# Patient Record
Sex: MALE | Race: ASIAN | Employment: OTHER | URBAN - METROPOLITAN AREA
[De-identification: names, ages, dates, MRNs, and addresses within clinical notes are randomized per-mention and may not be internally consistent; named-entity substitution may affect disease eponyms.]

---

## 2017-03-14 ENCOUNTER — OFFICE VISIT (OUTPATIENT)
Dept: OPHTHALMOLOGY | Facility: CLINIC | Age: 82
End: 2017-03-14
Attending: OPHTHALMOLOGY
Payer: MEDICARE

## 2017-03-14 DIAGNOSIS — H52.4 MYOPIA WITH ASTIGMATISM AND PRESBYOPIA, UNSPECIFIED LATERALITY: ICD-10-CM

## 2017-03-14 DIAGNOSIS — Z96.1 PSEUDOPHAKIA OF BOTH EYES: ICD-10-CM

## 2017-03-14 DIAGNOSIS — H54.7 BLINDNESS: ICD-10-CM

## 2017-03-14 DIAGNOSIS — H02.836 DERMATOCHALASIS OF EYELIDS OF BOTH EYES: ICD-10-CM

## 2017-03-14 DIAGNOSIS — H33.052 OLD RETINAL DETACHMENT, TOTAL OR SUBTOTAL, LEFT: ICD-10-CM

## 2017-03-14 DIAGNOSIS — H52.209 MYOPIA WITH ASTIGMATISM AND PRESBYOPIA, UNSPECIFIED LATERALITY: ICD-10-CM

## 2017-03-14 DIAGNOSIS — H04.123 DRY EYES, BILATERAL: ICD-10-CM

## 2017-03-14 DIAGNOSIS — E11.9 TYPE 2 DIABETES MELLITUS WITHOUT RETINOPATHY (H): Primary | ICD-10-CM

## 2017-03-14 DIAGNOSIS — H40.001 GLAUCOMA SUSPECT, RIGHT: ICD-10-CM

## 2017-03-14 DIAGNOSIS — H52.10 MYOPIA WITH ASTIGMATISM AND PRESBYOPIA, UNSPECIFIED LATERALITY: ICD-10-CM

## 2017-03-14 DIAGNOSIS — H02.833 DERMATOCHALASIS OF EYELIDS OF BOTH EYES: ICD-10-CM

## 2017-03-14 PROCEDURE — 92015 DETERMINE REFRACTIVE STATE: CPT | Mod: GY,ZF

## 2017-03-14 PROCEDURE — 92133 CPTRZD OPH DX IMG PST SGM ON: CPT | Mod: ZF | Performed by: OPHTHALMOLOGY

## 2017-03-14 PROCEDURE — 99215 OFFICE O/P EST HI 40 MIN: CPT | Mod: ZF

## 2017-03-14 ASSESSMENT — CUP TO DISC RATIO
OS_RATIO: 0.75
OD_RATIO: 0.75

## 2017-03-14 ASSESSMENT — REFRACTION_MANIFEST
OD_CYLINDER: +0.75
OS_SPHERE: BALANCE
OD_SPHERE: -0.75
OD_ADD: +2.75
OD_AXIS: 085

## 2017-03-14 ASSESSMENT — VISUAL ACUITY
OD_PH_SC: 20/40-2
OD_SC+: +2
OS_SC: NLP
METHOD: SNELLEN - LINEAR
OD_SC: 20/60

## 2017-03-14 ASSESSMENT — REFRACTION_WEARINGRX
OS_SPHERE: BALANCE
OD_SPHERE: -2.25
OD_AXIS: 095
OD_CYLINDER: +1.00

## 2017-03-14 ASSESSMENT — TONOMETRY
OD_IOP_MMHG: 20
IOP_METHOD: TONOPEN
OS_IOP_MMHG: 26

## 2017-03-14 ASSESSMENT — CONF VISUAL FIELD
OS_INFERIOR_NASAL_RESTRICTION: 1
OS_SUPERIOR_NASAL_RESTRICTION: 1
OS_SUPERIOR_TEMPORAL_RESTRICTION: 1
OS_INFERIOR_TEMPORAL_RESTRICTION: 1

## 2017-03-14 ASSESSMENT — PACHYMETRY: OD_CT(UM): 564

## 2017-03-14 ASSESSMENT — SLIT LAMP EXAM - LIDS
COMMENTS: UPPER LID DERMATOCHALASIS
COMMENTS: UPPER LID DERMATOCHALASIS

## 2017-03-14 NOTE — NURSING NOTE
Chief Complaints and History of Present Illnesses   Patient presents with     Eye Exam For Diabetes     Yearly follow up both eyes.     HPI    Affected eye(s):  Both   Symptoms:     No floaters   No flashes   No redness   No Dryness         Do you have eye pain now?:  No      Comments:  Pt here for his yearly diabetic eye exam. No changes in vision since last visit.  DMII BS: 140 this morning.  A1C: most recent unknown to pt.  Lab Results       Component                Value               Date                       A1C                      5.0                 12/20/2015                 A1C                                          06/19/2014             Unable to calculate HGB <6        A1C                      5.4                 07/23/2009                 A1C                      8.2                 03/28/2006                 A1C                      5.4                 09/09/2005              Oni Palomo [facility] March 14, 2017 10:34 AM

## 2017-03-14 NOTE — PROGRESS NOTES
CC: Diabetic Eye exam    HPI: Last A1C unknown, BGs 130-150 at home, per pts wife on insulin. No vision changes or other complaints. No pain, irritation or discomfort. No flashes/floaters. C/o occasional tearing. Uses AT PRN with improvement in condition.      Pt here with wife and . Pt is hard of hearing with limited cooperation on exam    History:  left eye blindness  Cataract extraction left eye 10+ years ago   Seizure disorder  ESRD on Dialysis    Total retinal detachment left eye, and he was referred for treatment to Dr. Ryland Rankin, who performed surgery on his left eye. We have no record of follow-up or subsequent exams. At the time of his eye exam in 2003, his visual acuity was hand movements only left eye    Assessment & Plan      Angle Munguia is a 81 year old male with the following diagnoses:   1. Type 2 diabetes mellitus without retinopathy (H)    2. Myopia with astigmatism and presbyopia, unspecified laterality    3. Blindness - Left Eye    4. 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 Dry eyes, bilateral    6. Pseudophakia of both eyes    7. Dermatochalasis of eyelids of both eyes       1. Type 2 diabetes without ophthalmic manifestation   Diabetes, No retinopathy   Stressed good glycemic and hypertensive control   Monitor yearly    2. Myopia with astigmatism and presbyopia, unspecified laterality   BCVA 20/40     Updated manifest refraction given   Tilted disc m right eye        3. Blindness - Left Eye   Due to Total Retinal detachment  In early 2000s   Monocular precautions    4. 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 Dry eyes    6. Pseudophakia both eyes    Monitor    7. Dermatochalasis   Monitor    8. Glc suspect, right eye      Cupping with superior and  inferior notch both eyes    Significant peripapillary atrophy   OCT Nerve fiber layer stable to 2013   Intraocular pressure within normal limits    Denies family history    Monitor closely for now      Patient disposition:   Return in about 6 months (around 9/14/2017) for OCT NFL, VT only. Dilated fundus exam in 1 year        Ant Nieto MD  PGY2, Dept of Ophthalmology  Pager [phone]      Attending Physician Attestation: Complete documentation of historical and exam elements from today's encounter can be found in the full encounter summary report (not reduplicated in this progress note). I personally obtained the chief complaint(s) and history of present illness.  I confirmed and edited as necessary the review of systems, past medical/surgical history, family history, social history, and examination findings as documented by others; and I examined the patient myself. I personally reviewed the relevant tests, images, and reports as documented above. I formulated and edited as necessary the assessment and plan and discussed the findings and management plan with the patient and family. - Tanner Bates MD 9:44 PM 3/14/2017

## 2017-03-14 NOTE — MR AVS SNAPSHOT
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      Your next 10 appointments already scheduled     Sep 12, 2017 10:15 AM CDT   RETURN GENERAL with Tanner Bates MD   Eye Clinic ([facility])    [address]  [address]  [address]  [place] [id number] [phone]              Who to contact     Please call your clinic at [phone] to:    Ask questions about your health    Make or cancel appointments    Discuss your medicines    Learn about your test results    Speak to your doctor   If you have compliments or concerns about an experience at your clinic, or if you wish to file a complaint, please contact [facility] Patient Relations at [phone] or email us at [email or web address]         Additional Information About Your Visit        MyChart Information     [employer] is an electronic gateway that provides easy, online access to your medical records. With [email or web address], you can request a clinic appointment, read your test results, renew a prescription or communicate with your care team.     To sign up for [employer] visit the website at [email or web address]   You will  be asked to enter the access code listed below, as well as some personal information. Please follow the directions to create your username and password.     Your access code is: [email or web address]  Expires: 2017  9:30 AM     Your access code will  in 90 days. If you need help or a new code, please contact your [facility] or call [phone] for assistance.        Care EveryWhere ID     This is your Care EveryWhere ID. This could be used by other organizations to access your [place] medical records  [id number]         Blood Pressure from Last 3 Encounters:   16 150/68   16 136/65   16 149/61    Weight from Last 3 Encounters:   12/22/15 62.1 kg (137 lb)   03/03/15 58.1 kg (128 lb)   14 60.3 kg (133 lb)              We Performed the Following     OCT Optic Nerve RNFL Spectralis OU (both eyes)        Primary Care Provider Office Phone # Fax #    La Nena Garcia [phone]       [facility]  [facility] [address]        Thank you!     Thank you for choosing EYE CLINIC  for your care. Our goal is always to provide you with excellent care. Hearing back from our patients is one way we can continue to improve our services. Please take a few minutes to complete the written survey that you may receive in the mail after your visit with us. Thank you!             Your Updated Medication List - Protect others around you: Learn how to safely use, store and throw away your medicines at www.disposemymeds.org.          This list is accurate as of: 3/14/17 12:49 PM.  Always use your most recent med list.                   Brand Name Dispense Instructions for use    calcium acetate 667 MG Caps capsule    PHOSLO     Take 667 mg by mouth 3 times daily (with meals)       calcium carbonate 500 MG tablet    OS-SHIRA 500 mg [place]. Ca     Take 500 mg by mouth 2 times daily       cefdinir 300 MG capsule    OMNICEF    5 capsule     Take 1 capsule (300 mg) by mouth every 48 hours       DIALYVITE Tabs      Take  by mouth.       ferrous sulfate 325 (65 FE) MG tablet    IRON     Take 325 mg by mouth daily (with breakfast)       lactulose 10 GM/15ML solution    CHRONULAC     Take 10 g by mouth as needed for constipation       metoprolol 25 MG 24 hr tablet    TOPROL-XL    30 tablet    Take 1 tablet (25 mg) by mouth daily       OMEPRAZOLE PO      Take 20 mg by mouth every morning       TYLENOL PO      Take 1,000 mg by mouth every 8 hours as needed       Urea 40 % Crea     180 g    Externally apply  topically 2 times daily. As directed.       VITAMIN D3 PO      Take 400 Units by mouth 2 times daily

## 2017-03-14 NOTE — LETTER
3/14/2017       RE: Angle Munguia  [address]  [place] [address]     Dear Colleague,    Thank you for referring your patient, Angle Munguia, to the EYE CLINIC at [facility]. Please see a copy of my visit note below.    CC: Diabetic Eye exam    HPI: Last A1C unknown, BGs 130-150 at home, per pts wife on insulin. No vision changes or other complaints. No pain, irritation or discomfort. No flashes/floaters. C/o occasional tearing. Uses AT PRN with improvement in condition.      Pt here with wife and . Pt is hard of hearing with limited cooperation on exam    History:  left eye blindness  Cataract extraction left eye 10+ years ago   Seizure disorder  ESRD on Dialysis    Total retinal detachment left eye, and he was referred for treatment to Dr. Ryland Rankin, who performed surgery on his left eye. We have no record of follow-up or subsequent exams. At the time of his eye exam in 2003, his visual acuity was hand movements only left eye    Assessment & Plan      Angle Munguia is a 81 year old male with the following diagnoses:   1. Type 2 diabetes mellitus without retinopathy (H)    2. Myopia with astigmatism and presbyopia, unspecified laterality    3. Blindness - Left Eye    4. 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 Dry eyes, bilateral    6. Pseudophakia of both eyes    7. Dermatochalasis of eyelids of both eyes       1. Type 2 diabetes without ophthalmic manifestation   Diabetes, No retinopathy   Stressed good glycemic and hypertensive control   Monitor yearly    2. Myopia with astigmatism and presbyopia, unspecified laterality   BCVA 20/40     Updated manifest refraction given   Tilted disc m right eye        3. Blindness - Left Eye   Due to Total Retinal detachment  In early 2000s   Monocular precautions    4. 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 Dry eyes    6. Pseudophakia both eyes    Monitor    7. Dermatochalasis   Monitor    8. Glc suspect, right eye      Cupping with superior and inferior notch both eyes    Significant peripapillary atrophy   OCT Nerve fiber layer stable to 2013   Intraocular pressure within normal limits    Denies family history    Monitor closely for now      Patient disposition:   Return in about 6 months (around 9/14/2017) for OCT NFL, VT only. Dilated fundus exam in 1 year        Ant Nieto MD  PGY2, Dept of Ophthalmology  Pager [phone]      Attending Physician Attestation: Complete documentation of historical and exam elements from today's encounter can be found in the full encounter summary report (not reduplicated in this progress note). I personally obtained the chief complaint(s) and history of present illness.  I confirmed and edited as necessary the review of systems, past medical/surgical history, family history, social history, and examination findings as documented by others; and I examined the patient myself. I personally reviewed the relevant tests, images, and reports as documented above. I formulated and edited as necessary the assessment and plan and discussed the findings and management plan with the patient and family. - Tanner Bates MD 9:44 PM 3/14/2017     Again, thank you for allowing me to participate in the care of your patient.      Sincerely,    Tanner Bates MD

## 2017-03-31 ENCOUNTER — TRANSFERRED RECORDS (OUTPATIENT)
Dept: HEALTH INFORMATION MANAGEMENT | Facility: CLINIC | Age: 82
End: 2017-03-31

## 2017-03-31 ENCOUNTER — HOSPITAL ENCOUNTER (EMERGENCY)
Facility: CLINIC | Age: 82
Discharge: HOME | End: 2017-03-31
Attending: EMERGENCY MEDICINE | Admitting: EMERGENCY MEDICINE
Payer: MEDICARE

## 2017-03-31 ENCOUNTER — APPOINTMENT (OUTPATIENT)
Dept: MRI IMAGING | Facility: CLINIC | Age: 82
End: 2017-03-31
Attending: EMERGENCY MEDICINE
Payer: MEDICARE

## 2017-03-31 ENCOUNTER — APPOINTMENT (OUTPATIENT)
Dept: CT IMAGING | Facility: CLINIC | Age: 82
End: 2017-03-31
Attending: EMERGENCY MEDICINE
Payer: MEDICARE

## 2017-03-31 ENCOUNTER — APPOINTMENT (OUTPATIENT)
Dept: GENERAL RADIOLOGY | Facility: CLINIC | Age: 82
End: 2017-03-31
Attending: EMERGENCY MEDICINE
Payer: MEDICARE

## 2017-03-31 ENCOUNTER — ALLIED HEALTH/NURSE VISIT (OUTPATIENT)
Dept: NEUROLOGY | Facility: CLINIC | Age: 82
End: 2017-03-31
Attending: PSYCHIATRY & NEUROLOGY
Payer: MEDICARE

## 2017-03-31 VITALS
RESPIRATION RATE: 18 BRPM | HEART RATE: 70 BPM | SYSTOLIC BLOOD PRESSURE: 123 MMHG | TEMPERATURE: 96.8 F | OXYGEN SATURATION: 99 % | WEIGHT: 135.6 LBS | HEIGHT: 60 IN | DIASTOLIC BLOOD PRESSURE: 67 MMHG | BODY MASS INDEX: 26.62 KG/M2

## 2017-03-31 DIAGNOSIS — N18.6 TYPE 2 DIABETES MELLITUS WITH ESRD (END-STAGE RENAL DISEASE) (H): ICD-10-CM

## 2017-03-31 DIAGNOSIS — N18.6 BENIGN HYPERTENSION WITH END-STAGE RENAL DISEASE (H): ICD-10-CM

## 2017-03-31 DIAGNOSIS — R56.9 SEIZURE (H): Primary | ICD-10-CM

## 2017-03-31 DIAGNOSIS — E11.22 TYPE 2 DIABETES MELLITUS WITH ESRD (END-STAGE RENAL DISEASE) (H): ICD-10-CM

## 2017-03-31 DIAGNOSIS — Z99.2 ENCOUNTER FOR EXTRACORPOREAL DIALYSIS (H): ICD-10-CM

## 2017-03-31 DIAGNOSIS — N18.6 ESRD (END STAGE RENAL DISEASE) ON DIALYSIS (H): ICD-10-CM

## 2017-03-31 DIAGNOSIS — R41.82 ALTERED MENTAL STATUS, UNSPECIFIED ALTERED MENTAL STATUS TYPE: ICD-10-CM

## 2017-03-31 DIAGNOSIS — N18.6 END STAGE RENAL DISEASE (H): ICD-10-CM

## 2017-03-31 DIAGNOSIS — I12.0 BENIGN HYPERTENSION WITH END-STAGE RENAL DISEASE (H): ICD-10-CM

## 2017-03-31 DIAGNOSIS — Z99.2 ESRD (END STAGE RENAL DISEASE) ON DIALYSIS (H): ICD-10-CM

## 2017-03-31 DIAGNOSIS — R40.4 UNRESPONSIVE EPISODE: ICD-10-CM

## 2017-03-31 LAB
ALBUMIN SERPL-MCNC: 3.4 G/DL (ref 3.4–5)
ALP SERPL-CCNC: 62 U/L (ref 40–150)
ALT SERPL W P-5'-P-CCNC: 45 U/L (ref 0–70)
AMMONIA PLAS-SCNC: 60 UMOL/L (ref 10–50)
ANION GAP SERPL CALCULATED.3IONS-SCNC: 16 MMOL/L (ref 3–14)
AST SERPL W P-5'-P-CCNC: 42 U/L (ref 0–45)
BASOPHILS # BLD AUTO: 0 10E9/L (ref 0–0.2)
BASOPHILS NFR BLD AUTO: 0.5 %
BILIRUB SERPL-MCNC: 0.6 MG/DL (ref 0.2–1.3)
BUN SERPL-MCNC: 11 MG/DL (ref 7–30)
CALCIUM SERPL-MCNC: 8.3 MG/DL (ref 8.5–10.1)
CHLORIDE SERPL-SCNC: 92 MMOL/L (ref 94–109)
CO2 SERPL-SCNC: 25 MMOL/L (ref 20–32)
CREAT SERPL-MCNC: 4.05 MG/DL (ref 0.66–1.25)
DIFFERENTIAL METHOD BLD: ABNORMAL
EOSINOPHIL # BLD AUTO: 0.1 10E9/L (ref 0–0.7)
EOSINOPHIL NFR BLD AUTO: 1.8 %
ERYTHROCYTE [DISTWIDTH] IN BLOOD BY AUTOMATED COUNT: 16.6 % (ref 10–15)
ETHANOL SERPL-MCNC: <0.01 G/DL
GFR SERPL CREATININE-BSD FRML MDRD: 14 ML/MIN/1.7M2
GLUCOSE BLDC GLUCOMTR-MCNC: 127 MG/DL (ref 70–99)
GLUCOSE SERPL-MCNC: 112 MG/DL (ref 70–99)
HCT VFR BLD AUTO: 29.8 % (ref 40–53)
HGB BLD-MCNC: 9.3 G/DL (ref 13.3–17.7)
IMM GRANULOCYTES # BLD: 0 10E9/L (ref 0–0.4)
IMM GRANULOCYTES NFR BLD: 0.2 %
INR PPP: 1.18 (ref 0.86–1.14)
INTERPRETATION ECG - MUSE: NORMAL
LYMPHOCYTES # BLD AUTO: 1.3 10E9/L (ref 0.8–5.3)
LYMPHOCYTES NFR BLD AUTO: 23.3 %
MCH RBC QN AUTO: 29.1 PG (ref 26.5–33)
MCHC RBC AUTO-ENTMCNC: 31.2 G/DL (ref 31.5–36.5)
MCV RBC AUTO: 93 FL (ref 78–100)
MONOCYTES # BLD AUTO: 0.4 10E9/L (ref 0–1.3)
MONOCYTES NFR BLD AUTO: 7.8 %
NEUTROPHILS # BLD AUTO: 3.7 10E9/L (ref 1.6–8.3)
NEUTROPHILS NFR BLD AUTO: 66.4 %
NRBC # BLD AUTO: 0 10*3/UL
NRBC BLD AUTO-RTO: 0 /100
PLATELET # BLD AUTO: 212 10E9/L (ref 150–450)
POTASSIUM SERPL-SCNC: 3.6 MMOL/L (ref 3.4–5.3)
PROLACTIN SERPL-MCNC: 75 UG/L (ref 2–18)
PROT SERPL-MCNC: 7.9 G/DL (ref 6.8–8.8)
RBC # BLD AUTO: 3.2 10E12/L (ref 4.4–5.9)
SODIUM SERPL-SCNC: 133 MMOL/L (ref 133–144)
TROPONIN I SERPL-MCNC: 0.01 UG/L (ref 0–0.04)
WBC # BLD AUTO: 5.5 10E9/L (ref 4–11)

## 2017-03-31 PROCEDURE — 00000146 ZZHCL STATISTIC GLUCOSE BY METER IP

## 2017-03-31 PROCEDURE — 96368 THER/DIAG CONCURRENT INF: CPT | Performed by: EMERGENCY MEDICINE

## 2017-03-31 PROCEDURE — 85025 COMPLETE CBC W/AUTO DIFF WBC: CPT | Performed by: EMERGENCY MEDICINE

## 2017-03-31 PROCEDURE — 95819 EEG AWAKE AND ASLEEP: CPT | Mod: ZF

## 2017-03-31 PROCEDURE — 84146 ASSAY OF PROLACTIN: CPT | Performed by: EMERGENCY MEDICINE

## 2017-03-31 PROCEDURE — 70551 MRI BRAIN STEM W/O DYE: CPT

## 2017-03-31 PROCEDURE — 80320 DRUG SCREEN QUANTALCOHOLS: CPT | Performed by: EMERGENCY MEDICINE

## 2017-03-31 PROCEDURE — 82140 ASSAY OF AMMONIA: CPT | Performed by: EMERGENCY MEDICINE

## 2017-03-31 PROCEDURE — 25800025 ZZH RX 258: Performed by: EMERGENCY MEDICINE

## 2017-03-31 PROCEDURE — 85610 PROTHROMBIN TIME: CPT | Performed by: EMERGENCY MEDICINE

## 2017-03-31 PROCEDURE — 25000128 H RX IP 250 OP 636: Performed by: EMERGENCY MEDICINE

## 2017-03-31 PROCEDURE — 99285 EMERGENCY DEPT VISIT HI MDM: CPT | Mod: Z6 | Performed by: EMERGENCY MEDICINE

## 2017-03-31 PROCEDURE — 93005 ELECTROCARDIOGRAM TRACING: CPT | Performed by: EMERGENCY MEDICINE

## 2017-03-31 PROCEDURE — 70450 CT HEAD/BRAIN W/O DYE: CPT

## 2017-03-31 PROCEDURE — 84484 ASSAY OF TROPONIN QUANT: CPT | Performed by: EMERGENCY MEDICINE

## 2017-03-31 PROCEDURE — 99285 EMERGENCY DEPT VISIT HI MDM: CPT | Mod: 25 | Performed by: EMERGENCY MEDICINE

## 2017-03-31 PROCEDURE — 80053 COMPREHEN METABOLIC PANEL: CPT | Performed by: EMERGENCY MEDICINE

## 2017-03-31 PROCEDURE — 96366 THER/PROPH/DIAG IV INF ADDON: CPT | Performed by: EMERGENCY MEDICINE

## 2017-03-31 PROCEDURE — 71010 XR CHEST PORT 1 VW: CPT

## 2017-03-31 PROCEDURE — 96365 THER/PROPH/DIAG IV INF INIT: CPT | Performed by: EMERGENCY MEDICINE

## 2017-03-31 PROCEDURE — S5010 5% DEXTROSE AND 0.45% SALINE: HCPCS | Performed by: EMERGENCY MEDICINE

## 2017-03-31 RX ORDER — ONDANSETRON 2 MG/ML
4 INJECTION INTRAMUSCULAR; INTRAVENOUS EVERY 30 MIN PRN
Status: DISCONTINUED | OUTPATIENT
Start: 2017-03-31 | End: 2017-04-01 | Stop reason: HOSPADM

## 2017-03-31 RX ORDER — LEVETIRACETAM 500 MG/1
500 TABLET ORAL DAILY
Qty: 30 TABLET | Refills: 0 | Status: SHIPPED | OUTPATIENT
Start: 2017-03-31

## 2017-03-31 RX ADMIN — LEVETIRACETAM 1000 MG: 100 INJECTION, SOLUTION INTRAVENOUS at 19:36

## 2017-03-31 RX ADMIN — DEXTROSE AND SODIUM CHLORIDE: 5; 450 INJECTION, SOLUTION INTRAVENOUS at 13:20

## 2017-03-31 NOTE — ED PROVIDER NOTES
History     Chief Complaint   Patient presents with     Altered Mental Status     The history is provided by the EMS personnel. The history is limited by the condition of the patient (altered mental status).     Angle Munguia is a 81 year old male, ong dialysis patient who was brought in by ambulance after 911 was called while the patient was at dialysis and became unresponsive. Paramedics arrived on the scene and found the patient with a blood sugar of 111 and an elevated blood pressure in the 190s. Patient was not following any commands and was brought to our facility and seen in cubicle 2 urgently. Paramedics noted that during the patient's dialysis run which lasted approximately 2 hours this morning the patient had a blood pressure spike up over 220 systolic at which point he became less responsive. The patient has had no vomiting and has been maintaining his own airway.    The patient's past medical history is significant for his end stage renal disease on dialysis as well as type 2 diabetes and left eye blindness. Patient's chronic medications include lactulose and metoprolol.     I have reviewed the Medications, Allergies, Past Medical and Surgical History, and Social History in the [employer] system.  Past Medical History:   Diagnosis Date     Chronic kidney disease      Dermatochalasis      Dialysis patient (H)      Dry eye syndrome      Gastric ulcer      Gastric ulcer      Glaucoma (increased eye pressure)      Glaucoma suspect      Gout      Hypertension      Nonsenile cataract      Retinal detachment     LE, now NLP      Seizures (H)      Type 2 diabetes mellitus without complications (H)        Past Surgical History:   Procedure Laterality Date     AV FISTULA OR GRAFT ARTERIAL      right arm     C PLACE CATH AV DIALYSIS SHUNT       CATARACT IOL, RT/LT Bilateral 2011     ESOPHAGOSCOPY, GASTROSCOPY, DUODENOSCOPY (EGD), COMBINED  1/30/2014    Procedure: COMBINED ESOPHAGOSCOPY, GASTROSCOPY, DUODENOSCOPY  (EGD), BIOPSY SINGLE OR MULTIPLE;;  Surgeon: Ashlyn Friedman MD;  Location:  GI     ESOPHAGOSCOPY, GASTROSCOPY, DUODENOSCOPY (EGD), COMBINED  3/27/2014    Procedure: COMBINED ESOPHAGOSCOPY, GASTROSCOPY, DUODENOSCOPY (EGD), BIOPSY SINGLE OR MULTIPLE;;  Surgeon: Ashlyn Friedman MD;  Location:  GI     UPPER GI ENDOSCOPY  3/27/14       Family History   Problem Relation Age of Onset     Glaucoma No family hx of      Macular Degeneration No family hx of        Social History   Substance Use Topics     Smoking status: Never Smoker     Smokeless tobacco: Not on file     Alcohol use No       Review of Systems   Unable to perform ROS: Mental status change       Physical Exam    /59  Pulse 94  Temp 96.8  F (36  C) (Oral)  Resp 16  Ht 1.524 m (5')  Wt 61.5 kg (135 lb 9.6 oz)  SpO2 100%  BMI 26.48 kg/m2  Physical Exam   Constitutional:   Patient gradually became more responsive as his ER course one on.  But initially was barely responsive except to painful stimulus.   HENT:   Head: Atraumatic.   Neck: Neck supple.   Airway patent   Cardiovascular: Normal heart sounds.    Pulmonary/Chest: Breath sounds normal. He exhibits no tenderness.   Abdominal: Soft. There is no tenderness.   Musculoskeletal: He exhibits no edema.   Neurological:   Able to move all extremities equally with painful stimulus   Skin: Skin is warm.   Psychiatric:   Unable to assess       ED Course     ED Course     Procedures        Patient was placed on cardiac monitor and oximetry.  The patient still had his dialysis access accessed but these lines were clamped off.    Patient's EKG revealed rate controlled atrial fibrillation an average rate of 96.  Patient's blood pressure gradually came down during his ER course.    Results for orders placed or performed during the hospital encounter of 03/31/17   XR Chest Port 1 View    Narrative    XR CHEST PORT 1 VW  3/31/2017 1:44 PM      HISTORY: AMS    COMPARISON:  12/18/2015    FINDINGS: AP view of the chest. Diffuse interstitial and airspace  opacities with moderate bilateral pleural effusions. Heart size is  stable. No pneumothorax. Left innominate vein stent stable in  position. Thoracic aortic calcifications again noted.      Impression    IMPRESSION:   1. Diffuse mixed interstitial airspace opacities have worsened,  consistent with pulmonary edema, severe infection, or ARDS.  2. Moderate bilateral layering pleural effusions.     I have personally reviewed the examination and initial interpretation  and I agree with the findings.    DHRUV HILL MD   CT Head w/o Contrast    Narrative    CT HEAD hemorrhage in the right CONTRAST 3/31/2017 2:02 PM    History: AMS    Comparison: 12/18/2015.    Technique: Using multidetector thin collimation helical acquisition  technique, axial, coronal and sagittal CT images from the skull base  to the vertex were obtained without intravenous contrast.     Findings:    There is no evidence of intracranial hemorrhage, mass effect, midline  shift, or abnormal extraaxial fluid collection. Redemonstration of  moderate generalized parenchymal volume loss. The ventricles and sulci  are symmetrical. Patchy regions of low-attenuation in the  periventricular and subcortical white matter, likely sequela of  chronic small ischemic disease. Gray-white differentiation is intact  throughout both cerebral hemispheres. Atherosclerotic vascular  calcifications also present.    The bony calvaria and the bones of the skull base appear normal.  The  visualized mastoid air cells are clear. Mucosal thickening of the left  sphenoid and ethmoid sinuses. No significant change chronic left  retinal hemorrhage with scleral banding procedure.       Impression    Impression:   1.  No acute intracranial hemorrhage or infarct.   2.  Chronic moderate generalized cerebral atrophy.  3.  Left sphenoid and ethmoid sinusitis. This is likely chronic given  the thickening  of the sphenoid sinus.  4.  Cerebral leukoariosis and atherosclerosis, unchanged.    I have personally reviewed the examination and initial interpretation  and I agree with the findings.    LUZ SAMPSON MD   CBC with platelets differential   Result Value Ref Range    WBC 5.5 4.0 - 11.0 10e9/L    RBC Count 3.20 (L) 4.4 - 5.9 10e12/L    Hemoglobin 9.3 (L) 13.3 - 17.7 g/dL    Hematocrit 29.8 (L) 40.0 - 53.0 %    MCV 93 78 - 100 fl    MCH 29.1 26.5 - 33.0 pg    MCHC 31.2 (L) 31.5 - 36.5 g/dL    RDW 16.6 (H) 10.0 - 15.0 %    Platelet Count 212 150 - 450 10e9/L    Diff Method Automated Method     % Neutrophils 66.4 %    % Lymphocytes 23.3 %    % Monocytes 7.8 %    % Eosinophils 1.8 %    % Basophils 0.5 %    % Immature Granulocytes 0.2 %    Nucleated RBCs 0 0 /100    Absolute Neutrophil 3.7 1.6 - 8.3 10e9/L    Absolute Lymphocytes 1.3 0.8 - 5.3 10e9/L    Absolute Monocytes 0.4 0.0 - 1.3 10e9/L    Absolute Eosinophils 0.1 0.0 - 0.7 10e9/L    Absolute Basophils 0.0 0.0 - 0.2 10e9/L    Abs Immature Granulocytes 0.0 0 - 0.4 10e9/L    Absolute Nucleated RBC 0.0    INR   Result Value Ref Range    INR 1.18 (H) 0.86 - 1.14   Comprehensive metabolic panel   Result Value Ref Range    Sodium 133 133 - 144 mmol/L    Potassium 3.6 3.4 - 5.3 mmol/L    Chloride 92 (L) 94 - 109 mmol/L    Carbon Dioxide 25 20 - 32 mmol/L    Anion Gap 16 (H) 3 - 14 mmol/L    Glucose 112 (H) 70 - 99 mg/dL    Urea Nitrogen 11 7 - 30 mg/dL    Creatinine 4.05 (H) 0.66 - 1.25 mg/dL    GFR Estimate 14 (L) >60 mL/min/1.7m2    GFR Estimate If Black 17 (L) >60 mL/min/1.7m2    Calcium 8.3 (L) 8.5 - 10.1 mg/dL    Bilirubin Total 0.6 0.2 - 1.3 mg/dL    Albumin 3.4 3.4 - 5.0 g/dL    Protein Total 7.9 6.8 - 8.8 g/dL    Alkaline Phosphatase 62 40 - 150 U/L    ALT 45 0 - 70 U/L    AST 42 0 - 45 U/L   Troponin I   Result Value Ref Range    Troponin I ES 0.015 0.000 - 0.045 ug/L   Ammonia   Result Value Ref Range    Ammonia 60 (H) 10 - 50 umol/L   Alcohol ethyl    Result Value Ref Range    Ethanol g/dL <0.01 <0.01 g/dL   Glucose by meter   Result Value Ref Range    Glucose 127 (H) 70 - 99 mg/dL   EKG 12-lead, tracing only   Result Value Ref Range    Interpretation ECG Click View Image link to view waveform and result      Critical Care time:  was greater than 30 minutes for this patient excluding procedures.      Labs Ordered and Resulted from Time of ED Arrival Up to the Time of Departure from the ED   CBC WITH PLATELETS DIFFERENTIAL - Abnormal; Notable for the following:        Result Value    RBC Count 3.20 (*)     Hemoglobin 9.3 (*)     Hematocrit 29.8 (*)     MCHC 31.2 (*)     RDW 16.6 (*)     All other components within normal limits   INR - Abnormal; Notable for the following:     INR 1.18 (*)     All other components within normal limits   COMPREHENSIVE METABOLIC PANEL - Abnormal; Notable for the following:     Chloride 92 (*)     Anion Gap 16 (*)     Glucose 112 (*)     Creatinine 4.05 (*)     GFR Estimate 14 (*)     GFR Estimate If Black 17 (*)     Calcium 8.3 (*)     All other components within normal limits   AMMONIA - Abnormal; Notable for the following:     Ammonia 60 (*)     All other components within normal limits   GLUCOSE BY METER - Abnormal; Notable for the following:     Glucose 127 (*)     All other components within normal limits   TROPONIN I   ALCOHOL ETHYL   PROLACTIN   PULSE OXIMETRY NURSING   CARDIAC CONTINUOUS MONITORING   PERIPHERAL IV CATHETER       Assessments & Plan (with Medical Decision Making)     I have reviewed the nursing notes.    Patient presented to the ER minimally responsive but managing his airway postdialysis.  As his ER course went on he became more responsive and alert and communicative with his family.  In the interim the patient underwent a large workup which included neurology consultation.  At this time etiology of his unresponsive episode was thought most likely to be seizure and the patient was loaded with Keppra and  underwent MRI.  At this time the patient will be sent home on Keppra and is to follow-up with neurology next week for review of his EEG and MRI results.    Medications   sodium chloride (PF) 0.9% PF flush 3 mL (not administered)   sodium chloride (PF) 0.9% PF flush 3 mL (3 mLs Intracatheter Not Given 3/31/17 1534)   dextrose 5% and 0.45% NaCl infusion ( Intravenous Stopped 3/31/17 2022)   ondansetron (ZOFRAN) injection 4 mg (not administered)   levETIRAcetam (KEPPRA) 1,000 mg in NaCl 0.9 % 100 mL intermittent infusion (0 mg Intravenous Stopped 3/31/17 2022)       I have reviewed the findings, diagnosis, plan and need for follow up with the patient.    New Prescriptions    LEVETIRACETAM (KEPPRA) 500 MG TABLET    Take 1 tablet (500 mg) by mouth daily       Final diagnoses:   Unresponsive episode - probable seizure   ESRD (end stage renal disease) on dialysis (H)     Please make an appointment to follow up with Neurology Clinic (phone: [phone]) in one week to review EEG and MRI results.    Return to the ER for worsening.    Please make an appointment to follow up with Your Primary Care Provider in 1-2 weeks for recheck.    Enrrique Lozano MD      3/31/2017   [facility], EMERGENCY DEPARTMENT     Enrrique Lozano MD  03/31/17 2029

## 2017-03-31 NOTE — CONSULTS
[facility]: [place]  Neurology Consultation    Patient Name:  Angle Munguia  MRN:  [id number]    :  1935  Date of Admission:  3/31/2017  Date of Service:  2017  Primary care provider:  [facility] Garcia      We were asked to see the patient by ED staff to evaluate:  Unresponsiveness    History of Present Illness: (history obtained from patient's daughter in law)  Angle Munguia is a 81 year old male with history of ESRD on HD who presents with unresponsiveness during dialysis.    Per patient's daughter-in-law, she was called while patient was in dialysis telling her that patient is being transported to the ED for seizure like activity and unresponsiveness.  She did not get further details.  Was hypertensive to the 220s. By the time she got to the ED, patient was arousable, but confused.    Per patient's daughter-in-law and chart review, patient first started having spells during sleep about 3 years ago.  His spells consist of jaw clenching, eye clenching, foaming at the mouth, and arm shaking during sleep.  Lasts for several minutes, then patient is confused for about an hour.  Used to occur once every couple months, but more recently has been occurring once per month since December, now three times per week.  Usually coincides with a dialysis day.  Patient was admitted to [facility] for similar episode 2014 where he had a negative EEG and CT head and was deemed syncope 2/2 hypovolemia.  Patient then followed up outpatient with Dr. Workman in neurology and had an outpatient sleep study that showed moderate obstructive sleep apnea, but did not catch one of these spells.  Treatment for NY was discussed with patient, who declined, and no further followup was completed.  It appeared that patient was supposed to undergo outpatient MRI and EEG for seizure workup, but was not completed.    Overall, patient's health has been declining over the past month.  He has gotten  weaker after dialysis and has required assistance with transfer.  His memory has also been declining, at times asking the same question after it has been answered.  Overall in the past year, his memory has worsened, but more rapidly over the past month.    ROS: A 10-point ROS unable to be obtained due to confusion.    Past Medical/Surgical History:  ESRD on HD  Gout  HTN  DMII    Medications:    Current Facility-Administered Medications   Medication     sodium chloride (PF) 0.9% PF flush 3 mL     sodium chloride (PF) 0.9% PF flush 3 mL     dextrose 5% and 0.45% NaCl infusion     ondansetron (ZOFRAN) injection 4 mg     Current Outpatient Prescriptions   Medication     cefdinir (OMNICEF) 300 MG capsule     lactulose (CHRONULAC) 10 GM/15ML solution     calcium carbonate (OS-SHIRA 500 MG [place]. [place]) 500 MG tablet     Acetaminophen (TYLENOL PO)     calcium acetate (PHOSLO) 667 MG CAPS     OMEPRAZOLE PO     metoprolol (TOPROL-XL) 25 MG 24 hr tablet     Cholecalciferol (VITAMIN D3 PO)     ferrous sulfate 325 (65 FE) MG tablet     Urea 40 % CREA     B Complex-C-Folic Acid (DIALYVITE) TABS       Allergies:    No Known Allergies    Social History: Unable to obtain due to mental status    Family History:  Unable to obtain due to mental status.    Neurologic Examination:    Vitals: /67  Pulse 94  Temp 96.8  F (36  C) (Oral)  Resp 17  Ht 1.524 m (5')  Wt 61.5 kg (135 lb 9.6 oz)  SpO2 100%  BMI 26.48 kg/m2  General: Cooperative, NAD  HEENT: Sclerae anicteric, neck supple   Neurologic:     Mental Status: Fully alert, attentive. Oriented to hospital and name, but not to birthday, month, or year.  Speech fluent per daughter-in-law.     Cranial Nerves:  Pupils equal and reactive.   EOMI with normal smooth pursuit.  Face symmetric at rest and with smile.  Tongue protrusion midline.  Shoulder shrug equal.     Motor: No involuntary movements observed. Normal tone.  Difficult to fully assess strength due to language barrier and  confusion, but able to raise both arms off bed and raise both legs off bed.     Deep Tendon Reflexes: 1+ bilateral biceps, difficult to elicit in brachioradialis and patellar.     Sensory: Difficult to assess given confusion.     Coordination: Could not follow commands to perform testing.     Station/Gait: Unsafe to test.    Pertinent Investigations:   CT head 3/31  1. No acute intracranial hemorrhage or infarct.   2. Chronic moderate generalized cerebral atrophy.  3. Left sphenoid and ethmoid sinusitis. This is likely chronic given  the thickening of the sphenoid sinus.  4. Cerebral leukoariosis and atherosclerosis, unchanged.    Sleep study 4/2015    Moderate NY however this may be an underestimation as the patient had minimal REM and minimal sleep supine.     NY can be treated with the following options:    Dental Appliance    Auto CPAP    Upper Airway Surgery    The patient had elevated periodic limb movements (PLMs). The majority of these were not associated with cortical arousal.     Treatment of PLMs (dopaminergic agents or delta-2 ligands) should be targeted at patients who either have wakeful motor restlessness or those in whom there is a high clinical suspicion of periodic limb movement disorder and not for elevated PLMs alone.    No target extensor spells were noted.    Recommend addressing comorbid sleep conditions and if events persist could repeat PSG. If there is a suspicion of possible epileptic etiology recommend referral to epilepsy program.    Impression:  Angle Munguia is a 81 year old male with history of ESRD on HD who presents with unresponsiveness during dialysis.  Patient also has a 3 year history of spells during sleep with seizure like activity and confusion afterwards, concerning for seizures.  Episode during dialysis was not well documented, but may represent similar episodes patient has had prior.  Has not returned back to baseline in terms of mental status, but improving over the  last several hours.    On review of her outpatient neurology notes, patient has prior sleep study that showed NY but did not capture one of his spells.  He did not complete further workup for seizures.  There did not appear to be a clear plan for followup after patient declined NY treatment.    With today's episode involving hypertension and possible seizure, there is also concern for PRES.      Recommendations:   --Recommend limited MRI to evaluate for PRES, can be missed on CT head.  --Recommend bedside EEG in ED as post-ictal state increases yield of capturing a seizure.   --Discussed with patient's family, who would prefer to take patient home if appropriate.  Please call neuro-on-call after EEG has been running for 30 minutes for interpretation.  Recommendations for discharge/followup vs admission and possible initiation of AED will be discussed upon review of EEG and MRI.  Would also like to see that patient continues to have improvement in his mental status over the next several hours.    Patient was seen and discussed with attending neurologist, Dr. Jessica Aguilar.    Josselin Naik   PGY3      EVENING UPDATE:  MRI reviewed - diffuse atrophy, as noted on head CT. Several subcortical white matter lesions on T2. No diffusion restriction. No evidence of PRES or stroke.  EEG showed diffuse slowing.    Recs:  1. Okay to discharge from neuro standpoint  2. Start keppra, 500mg daily, with an additional 500mg after each run of dialysis  3. Follow-up in neurology clinic in 6-8 weeks    Murray Guzman MD. Neuro PGY4.  3/31/2017 8:53 PM

## 2017-03-31 NOTE — ED AVS SNAPSHOT
[facility], Emergency Department    [address] [address]    Phone:  [phone]                                       Angle Munguia   MRN: [id number]    Department:  [facility], Emergency Department   Date of Visit:  3/31/2017           Patient Information     Date Of Birth          1935        Your diagnoses for this visit were:     Unresponsive episode probable seizure    ESRD (end stage renal disease) on dialysis (H)        You were seen by Enrrique Lozano MD.        Discharge Instructions       Please make an appointment to follow up with Neurology Clinic (phone: [phone]) in one week to review EEG and MRI results.    Return to the ER for worsening.    Please make an appointment to follow up with Your Primary Care Provider in 1-2 weeks for recheck.    Future Appointments        Provider Department Dept Phone Center    9/12/2017 10:15 AM Tanner Bates MD [facility] [phone] [facility]      24 Hour Appointment Hotline       To make an appointment at any [facility], call [phone] ([phone]). If you don't have a family doctor or clinic, we will help you find one. [facility] clinics are conveniently located to serve the needs of you and your family.             Review of your medicines      START taking        Dose / Directions Last dose taken    levETIRAcetam 500 MG tablet   Commonly known as:  KEPPRA   Dose:  500 mg   Quantity:  30 tablet        Take 1 tablet (500 mg) by mouth daily   Refills:  0          Our records show that you are taking the medicines listed below. If these are incorrect, please call your family doctor or clinic.        Dose / Directions Last dose taken    calcium acetate 667 MG Caps capsule   Commonly known as:  PHOSLO   Dose:  667 mg        Take 667 mg by mouth 3 times daily (with meals)   Refills:  0        calcium carbonate 500 MG tablet   Commonly known as:  OS-SHIRA 500 mg [place]   Dose:  500 mg        Take 500 mg  by mouth 2 times daily   Refills:  0        cefdinir 300 MG capsule   Commonly known as:  OMNICEF   Dose:  300 mg   Indication:  HCAP   Quantity:  5 capsule        Take 1 capsule (300 mg) by mouth every 48 hours   Refills:  0        DIALYVITE Tabs        Take  by mouth.   Refills:  0        ferrous sulfate 325 (65 FE) MG tablet   Commonly known as:  IRON   Dose:  325 mg        Take 325 mg by mouth daily (with breakfast)   Refills:  0        lactulose 10 GM/15ML solution   Commonly known as:  CHRONULAC   Dose:  10 g        Take 10 g by mouth as needed for constipation   Refills:  0        metoprolol 25 MG 24 hr tablet   Commonly known as:  TOPROL-XL   Dose:  25 mg   Quantity:  30 tablet        Take 1 tablet (25 mg) by mouth daily   Refills:  0        OMEPRAZOLE PO   Dose:  20 mg        Take 20 mg by mouth every morning   Refills:  0        TYLENOL PO   Dose:  1000 mg        Take 1,000 mg by mouth every 8 hours as needed   Refills:  0        Urea 40 % Crea   Quantity:  180 g        Externally apply  topically 2 times daily. As directed.   Refills:  3        VITAMIN D3 PO   Dose:  400 Units        Take 400 Units by mouth 2 times daily   Refills:  0                Prescriptions were sent or printed at these locations (1 Prescription)                   Other Prescriptions                Printed at Department/Unit printer (1 of 1)         levETIRAcetam (KEPPRA) 500 MG tablet                Procedures and tests performed during your visit     Alcohol ethyl    Ammonia    CBC with platelets differential    CT Head w/o Contrast    Cardiac Continuous Monitoring    Comprehensive metabolic panel    EEG    EKG 12-lead, tracing only    Glucose by meter    INR    MR Brain for Stroke Limited    Peripheral IV catheter    Prolactin    Pulse oximetry nursing    Troponin I    XR Chest Port 1 View      Orders Needing Specimen Collection     None      Pending Results     Date and Time Order Name Status Description    3/31/2017 2842   "Brain for Stroke Limited In process     3/31/2017 1307 Prolactin In process             Pending Culture Results     No orders found from 3/29/2017 to 2017.            Thank you for choosing [facility]       Thank you for choosing [facility] for your care. Our goal is always to provide you with excellent care. Hearing back from our patients is one way we can continue to improve our services. Please take a few minutes to complete the written survey that you may receive in the mail after you visit with us. Thank you!        [employer] Information     [employer] lets you send messages to your doctor, view your test results, renew your prescriptions, schedule appointments and more. To sign up, go to [email or web address] . Click on \"Log in\" on the left side of the screen, which will take you to the Welcome page. Then click on \"Sign up Now\" on the right side of the page.     You will be asked to enter the access code listed below, as well as some personal information. Please follow the directions to create your username and password.     Your access code is: [email or web address]  Expires: 2017  9:30 AM     Your access code will  in 90 days. If you need help or a new code, please call your [facility] clinic or [phone].        Care EveryWhere ID     This is your Care EveryWhere ID. This could be used by other organizations to access your [facility] medical records  [id number]        After Visit Summary       This is your record. Keep this with you and show to your community pharmacist(s) and doctor(s) at your next visit.                  "

## 2017-03-31 NOTE — PROGRESS NOTES
REPORT of E.D. Electroencephalography              DATE OF RECORDIN2017          I reviewed the urgent portable EEG recording of Angle Munguia in the E.D.        The 24-minute recording was abnormal due to generalized delta-theta slowing during waking.  No electrographic seizures and no interictal epileptiform abnormalities were observed.         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  This recording excludes non-convulsive status epilepticus as a possible cause of this encephalopathy.    Gee Sifuentes M.D., [facility] [phone]

## 2017-03-31 NOTE — ED NOTES
Bed: ED02  Expected date: 3/31/17  Expected time: 12:57 PM  Means of arrival: Ambulance  Comments:  [address]    80 y/o male    Altered LOC and HTN  Hx:  Dialysis

## 2017-03-31 NOTE — ED AVS SNAPSHOT
[facility] [place], Emergency Department    [address] [place] [address]    Phone:  [phone]                                       Angle Munguia   MRN: [id number]    Department:  [facility], Emergency Department   Date of Visit:  3/31/2017           After Visit Summary Signature Page     I have received my discharge instructions, and my questions have been answered. I have discussed any challenges I see with this plan with the nurse or doctor.    ..........................................................................................................................................  Patient/Patient Representative Signature      ..........................................................................................................................................  Patient Representative Print Name and Relationship to Patient    ..................................................               ................................................  Date                                            Time    ..........................................................................................................................................  Reviewed by Signature/Title    ...................................................              ..............................................  Date                                                            Time

## 2017-03-31 NOTE — MR AVS SNAPSHOT
After Visit Summary   3/31/2017    Angle Munguia    MRN: [id number]           Patient Information     Date Of Birth          1935        Visit Information        Provider Department      3/31/2017 5:00 PM [facility] EEG TECH 2 [facility]        Today's Diagnoses     Seizure (H)    -  1       Follow-ups after your visit        Your next 10 appointments already scheduled     Sep 12, 2017 10:15 AM CDT   RETURN GENERAL with Tanner Bates MD   Eye Clinic ([facility])    [address]  [address]  [address]  [facility] [address]   [phone]              Future tests that were ordered for you today     Open Standing Orders        Priority Remaining Interval Expires Ordered    Oxygen: Nasal cannula, Oxygen mask STAT 78466/95583 CONTINUOUS  3/31/2017            Who to contact     Please call your clinic at [phone] to:    Ask questions about your health    Make or cancel appointments    Discuss your medicines    Learn about your test results    Speak to your doctor   If you have compliments or concerns about an experience at your clinic, or if you wish to file a complaint, please contact [facility] Patient Relations at [phone] or email us at [email or web address]         Additional Information About Your Visit        MyChart Information     Snippets is an electronic gateway that provides easy, online access to your medical records. With Snippets, you can request a clinic appointment, read your test results, renew a prescription or communicate with your care team.     To sign up for [employer] visit the website at [email or web address]   You will be asked to enter the access code listed below, as well as some personal information. Please follow the directions to create your username and password.     Your access code is: [email or web address]  Expires: 2017  9:30 AM     Your access code will  in 90 days. If you need help or a  new code, please contact your [facility] or call [phone] for assistance.        Care EveryWhere ID     This is your Care EveryWhere ID. This could be used by other organizations to access your [facility] medical records  [id number]         Blood Pressure from Last 3 Encounters:   03/31/17 123/67   12/14/16 150/68   08/18/16 136/65    Weight from Last 3 Encounters:   03/31/17 61.5 kg (135 lb 9.6 oz)   12/22/15 62.1 kg (137 lb)   03/03/15 58.1 kg (128 lb)              We Performed the Following     EEG          Today's Medication Changes      Notice     This visit is during an admission. Changes to the med list made in this visit will be reflected in the After Visit Summary of the admission.             Primary Care Provider Office Phone # Fax #    [facility] Garcia [phone]       [facility] [address] [facility] [address]        Thank you!     Thank you for choosing [facility]  for your care. Our goal is always to provide you with excellent care. Hearing back from our patients is one way we can continue to improve our services. Please take a few minutes to complete the written survey that you may receive in the mail after your visit with us. Thank you!             Your Updated Medication List - Protect others around you: Learn how to safely use, store and throw away your medicines at www.disposemymeds.org.      Notice     This visit is during an admission. Changes to the med list made in this visit will be reflected in the After Visit Summary of the admission.

## 2017-03-31 NOTE — ED NOTES
Pt presents to ED via EMS from dialysis where Pt was noted to be unresponsive for unknown period of time. PT has been hypertensive.

## 2017-04-01 NOTE — PROCEDURES
[facility] EEG#:        DATE OF RECORDIN2017      DURATION OF RECORDIN minutes.      CLINICAL SUMMARY:  This urgent portable EEG recording was performed in evaluation of seizures in the Emergency Department for Angle Hung.  The recording was performed with 23 scalp electrodes placed in the 10-20 system.  He was not reported to be receiving medications at the time of this recording.      EEG ACTIVITIES DURING WAKING:  During the waking state there was a poorly sustained bilateral posterior dominant rhythm at 8 Hz, which was attenuated on eye opening.  Predominant electrocerebral activities during waking consisted of generalized irregular 2-8 Hz delta-theta slowing, with frequent intermixture of lower amplitude faster frequencies.  No interictal epileptiform abnormalities and no electrographic seizures were recorded.      IMPRESSION:  This was an abnormal awake EEG recording due to generalized delta-theta slowing.  No interictal epileptiform abnormalities and no electrographic seizures were recorded.    These findings exclude a possible diagnosis of nonconvulsive status epilepticus as a cause of encephalopathy.  These findings indicate moderate electrographic encephalopathy, which is etiologically nonspecific.  Clinical correlation is recommended.   Gee Sifuentes M.D., Professor of Neurology      D: 2017 18:07   T: 2017 19:25   MT:       Name:     ANGLE HUNG   MRN:      [id number]        Account:        [id number]   :      1935           Procedure Date: 2017      Document: [id number]

## 2017-04-01 NOTE — DISCHARGE INSTRUCTIONS
Please make an appointment to follow up with Neurology Clinic (phone: [phone]) in one week to review EEG and MRI results.    Return to the ER for worsening.    Please make an appointment to follow up with Your Primary Care Provider in 1-2 weeks for recheck.

## 2017-05-22 ENCOUNTER — TRANSFERRED RECORDS (OUTPATIENT)
Dept: HEALTH INFORMATION MANAGEMENT | Facility: CLINIC | Age: 82
End: 2017-05-22

## 2017-06-03 ENCOUNTER — TRANSFERRED RECORDS (OUTPATIENT)
Dept: HEALTH INFORMATION MANAGEMENT | Facility: CLINIC | Age: 82
End: 2017-06-03

## 2017-06-05 ENCOUNTER — HOSPITAL ENCOUNTER (OUTPATIENT)
Facility: CLINIC | Age: 82
Discharge: HOME | End: 2017-06-05
Attending: RADIOLOGY | Admitting: RADIOLOGY
Payer: MEDICARE

## 2017-06-05 ENCOUNTER — APPOINTMENT (OUTPATIENT)
Dept: INTERVENTIONAL RADIOLOGY/VASCULAR | Facility: CLINIC | Age: 82
End: 2017-06-05
Attending: INTERNAL MEDICINE
Payer: MEDICARE

## 2017-06-05 VITALS
DIASTOLIC BLOOD PRESSURE: 70 MMHG | SYSTOLIC BLOOD PRESSURE: 161 MMHG | TEMPERATURE: 97.4 F | RESPIRATION RATE: 20 BRPM | OXYGEN SATURATION: 94 % | HEART RATE: 84 BPM

## 2017-06-05 DIAGNOSIS — Z51.89 TREATMENT: ICD-10-CM

## 2017-06-05 LAB — GLUCOSE BLDC GLUCOMTR-MCNC: 100 MG/DL (ref 70–99)

## 2017-06-05 PROCEDURE — 99153 MOD SED SAME PHYS/QHP EA: CPT

## 2017-06-05 PROCEDURE — 27210906 ZZH KIT CR8

## 2017-06-05 PROCEDURE — 82962 GLUCOSE BLOOD TEST: CPT

## 2017-06-05 PROCEDURE — 27210845 ZZH DEVICE INFLATION CR5

## 2017-06-05 PROCEDURE — C1725 CATH, TRANSLUMIN NON-LASER: HCPCS

## 2017-06-05 PROCEDURE — 27210742 ZZH CATH CR1

## 2017-06-05 PROCEDURE — 25000128 H RX IP 250 OP 636: Performed by: RADIOLOGY

## 2017-06-05 PROCEDURE — C1769 GUIDE WIRE: HCPCS

## 2017-06-05 PROCEDURE — 27210804 ZZH SHEATH CR3

## 2017-06-05 PROCEDURE — 25000125 ZZHC RX 250: Performed by: RADIOLOGY

## 2017-06-05 PROCEDURE — 27210886 ZZH ACCESSORY CR5

## 2017-06-05 PROCEDURE — 40000854 ZZH STATISTIC SIMPLE TUBE INSERTION/CHARGE, PORT, CATH, FISTULOGRAM

## 2017-06-05 RX ORDER — LIDOCAINE 40 MG/G
CREAM TOPICAL
Status: DISCONTINUED | OUTPATIENT
Start: 2017-06-05 | End: 2017-06-05 | Stop reason: HOSPADM

## 2017-06-05 RX ORDER — NALOXONE HYDROCHLORIDE 0.4 MG/ML
.1-.4 INJECTION, SOLUTION INTRAMUSCULAR; INTRAVENOUS; SUBCUTANEOUS
Status: DISCONTINUED | OUTPATIENT
Start: 2017-06-05 | End: 2017-06-05 | Stop reason: HOSPADM

## 2017-06-05 RX ORDER — LIDOCAINE HYDROCHLORIDE 10 MG/ML
1-30 INJECTION, SOLUTION EPIDURAL; INFILTRATION; INTRACAUDAL; PERINEURAL
Status: DISCONTINUED | OUTPATIENT
Start: 2017-06-05 | End: 2017-06-05 | Stop reason: HOSPADM

## 2017-06-05 RX ORDER — HYDRALAZINE HYDROCHLORIDE 20 MG/ML
10 INJECTION INTRAMUSCULAR; INTRAVENOUS EVERY 6 HOURS PRN
Status: DISCONTINUED | OUTPATIENT
Start: 2017-06-05 | End: 2017-06-05 | Stop reason: HOSPADM

## 2017-06-05 RX ORDER — IOPAMIDOL 612 MG/ML
50 INJECTION, SOLUTION INTRAVASCULAR ONCE
Status: COMPLETED | OUTPATIENT
Start: 2017-06-05 | End: 2017-06-05

## 2017-06-05 RX ORDER — FENTANYL CITRATE 50 UG/ML
25-50 INJECTION, SOLUTION INTRAMUSCULAR; INTRAVENOUS EVERY 5 MIN PRN
Status: DISCONTINUED | OUTPATIENT
Start: 2017-06-05 | End: 2017-06-05 | Stop reason: HOSPADM

## 2017-06-05 RX ORDER — FENTANYL CITRATE 50 UG/ML
INJECTION, SOLUTION INTRAMUSCULAR; INTRAVENOUS
Status: DISCONTINUED
Start: 2017-06-05 | End: 2017-06-05 | Stop reason: HOSPADM

## 2017-06-05 RX ORDER — HYDRALAZINE HYDROCHLORIDE 20 MG/ML
INJECTION INTRAMUSCULAR; INTRAVENOUS
Status: DISCONTINUED
Start: 2017-06-05 | End: 2017-06-05 | Stop reason: HOSPADM

## 2017-06-05 RX ORDER — NITROGLYCERIN 0.4 MG/1
0.4 TABLET SUBLINGUAL EVERY 5 MIN PRN
COMMUNITY

## 2017-06-05 RX ORDER — HEPARIN SODIUM 1000 [USP'U]/ML
INJECTION, SOLUTION INTRAVENOUS; SUBCUTANEOUS
Status: DISCONTINUED
Start: 2017-06-05 | End: 2017-06-05 | Stop reason: HOSPADM

## 2017-06-05 RX ORDER — FLUMAZENIL 0.1 MG/ML
0.2 INJECTION, SOLUTION INTRAVENOUS
Status: DISCONTINUED | OUTPATIENT
Start: 2017-06-05 | End: 2017-06-05 | Stop reason: HOSPADM

## 2017-06-05 RX ADMIN — MIDAZOLAM HYDROCHLORIDE 0.5 MG: 1 INJECTION, SOLUTION INTRAMUSCULAR; INTRAVENOUS at 08:32

## 2017-06-05 RX ADMIN — FENTANYL CITRATE 25 MCG: 50 INJECTION, SOLUTION INTRAMUSCULAR; INTRAVENOUS at 08:32

## 2017-06-05 RX ADMIN — FENTANYL CITRATE 25 MCG: 50 INJECTION, SOLUTION INTRAMUSCULAR; INTRAVENOUS at 08:23

## 2017-06-05 RX ADMIN — HYDRALAZINE HYDROCHLORIDE 10 MG: 20 INJECTION INTRAMUSCULAR; INTRAVENOUS at 08:55

## 2017-06-05 RX ADMIN — MIDAZOLAM HYDROCHLORIDE 0.5 MG: 1 INJECTION, SOLUTION INTRAMUSCULAR; INTRAVENOUS at 08:23

## 2017-06-05 RX ADMIN — IOPAMIDOL 35 ML: 612 INJECTION, SOLUTION INTRAVENOUS at 09:01

## 2017-06-05 NOTE — IP AVS SNAPSHOT
MRN:[id number]                      After Visit Summary   6/5/2017    Angle Munguia    MRN: [id number]           Visit Information        Department      6/5/2017  6:06 AM [facility]          Review of your medicines      UNREVIEWED medicines. Ask your doctor about these medicines        Dose / Directions    ALLOPURINOL PO        Dose:  100 mg   Take 100 mg by mouth daily   Refills:  0       AMLODIPINE BESYLATE PO        Dose:  5 mg   Take 5 mg by mouth 2 times daily   Refills:  0       calcium acetate 667 MG Caps capsule   Commonly known as:  PHOSLO        Dose:  667 mg   Take 667 mg by mouth 3 times daily (with meals)   Refills:  0       calcium carbonate 1250 MG tablet   Commonly known as:  OS-SHIRA 500 mg [place]        Dose:  500 mg   Take 500 mg by mouth 2 times daily   Refills:  0       cefdinir 300 MG capsule   Commonly known as:  OMNICEF   Indication:  HCAP   Used for:  Community acquired pneumonia        Dose:  300 mg   Take 1 capsule (300 mg) by mouth every 48 hours   Quantity:  5 capsule   Refills:  0       DIALYVITE Tabs        Take  by mouth.   Refills:  0       lactulose 10 GM/15ML solution   Commonly known as:  CHRONULAC        Dose:  10 g   Take 10 g by mouth as needed for constipation   Refills:  0       levETIRAcetam 500 MG tablet   Commonly known as:  KEPPRA        Dose:  500 mg   Take 1 tablet (500 mg) by mouth daily   Quantity:  30 tablet   Refills:  0       LISINOPRIL PO        Dose:  20 mg   Take 20 mg by mouth daily   Refills:  0       metoprolol 25 MG 24 hr tablet   Commonly known as:  TOPROL-XL   Used for:  Atrial fibrillation (H)        Dose:  25 mg   Take 1 tablet (25 mg) by mouth daily   Quantity:  30 tablet   Refills:  0       nitroglycerin 0.4 MG sublingual tablet   Commonly known as:  NITROSTAT        Dose:  0.4 mg   Place 0.4 mg under the tongue every 5 minutes as needed for chest pain For chest pain place 1 tablet under the tongue every 5  minutes for 3 doses. If symptoms persist 5 minutes after 1st dose call 911.   Refills:  0       OMEPRAZOLE PO        Dose:  20 mg   Take 20 mg by mouth every morning   Refills:  0       PLAVIX PO        Take by mouth daily   Refills:  0       TYLENOL PO        Dose:  1000 mg   Take 1,000 mg by mouth every 8 hours as needed   Refills:  0       Urea 40 % Crea   Used for:  Xerosis cutis        Externally apply  topically 2 times daily. As directed.   Quantity:  180 g   Refills:  3       VITAMIN D3 PO        Dose:  400 Units   Take 400 Units by mouth daily   Refills:  0                Protect others around you: Learn how to safely use, store and throw away your medicines at www.disposemymeds.org.         Follow-ups after your visit        Your next 10 appointments already scheduled     Sep 12, 2017 10:15 AM CDT   RETURN GENERAL with Tanner Bates MD   [facility] ([facility])    [address]  [address]  [facility] [address]   [phone]               Care Instructions        Further instructions from your care team       Fistulagram Discharge Instructions     After you go home:      You may resume your normal diet    Have an adult stay with you for 6 hours if you received sedation       For 24 hours - due to the sedation you received:    Relax and take it easy    Do NOT make any important or legal decisions    Do NOT drive or operate machines at home or at work    Do NOT drink alcohol    Care of Puncture Site:      For the first 48 hrs, check your puncture site every couple hours while you are awake     You may remove/change the bandage tomorrow    You may shower tomorrow    No tub baths, whirlpools or swimming until your puncture site has fully healed    If puncture site starts to bleed - apply light pressure to site for 5 minutes or until bleeding stops     Activity       You should try to elevate your arm for the remainder of today to prevent increased  "swelling    You may go back to your normal activity in 24 hours     Wait 48 hours before lifting, straining, exercise or other strenuous activity    Medicines:      You may resume all your medications    For minor pain, you may take Acetaminophen (Tylenol) or Ibuprofen (Advil)                 Call the provider who ordered this procedure if:      Increased pain or a large or growing hard lump around the site    Blood or fluid is draining from the site    The site is red, swollen, hot or tender    Chills or a fever greater than 101 F (38 C)    Pain that is getting worse    Any questions or concerns      Call  911 or go to the Emergency Room if:    Severe pain or trouble breathing    Bleeding that you cannot control      If you have questions call:          [facility] Radiology Dept @ [phone]        The provider who performed your procedure was ___Dr Denny______________.           Additional Information About Your Visit        MyChart Information     [employer] lets you send messages to your doctor, view your test results, renew your prescriptions, schedule appointments and more. To sign up, go to [email or web address] . Click on \"Log in\" on the left side of the screen, which will take you to the Welcome page. Then click on \"Sign up Now\" on the right side of the page.     You will be asked to enter the access code listed below, as well as some personal information. Please follow the directions to create your username and password.     Your access code is: [email or web address]  Expires: 9/3/2017  9:48 AM     Your access code will  in 90 days. If you need help or a new code, please call your [place] clinic or [phone].        Care EveryWhere ID     This is your Care EveryWhere ID. This could be used by other organizations to access your [place] medical records  [id number]        Your Vitals Were     Blood Pressure Pulse Temperature Respirations Pulse Oximetry       171/76 84 97.4  F (36.3  C) (Oral) " 16 97%        Primary Care Provider Office Phone # Fax #    La Nena Garcia [phone]      Thank you!     Thank you for choosing [facility] for your care. Our goal is always to provide you with excellent care. Hearing back from our patients is one way we can continue to improve our services. Please take a few minutes to complete the written survey that you may receive in the mail after you visit with us. Thank you!             Medication List: This is a list of all your medications and when to take them. Check marks below indicate your daily home schedule. Keep this list as a reference.      Medications           Morning Afternoon Evening Bedtime As Needed    ALLOPURINOL PO   Take 100 mg by mouth daily                                AMLODIPINE BESYLATE PO   Take 5 mg by mouth 2 times daily                                calcium acetate 667 MG Caps capsule   Commonly known as:  PHOSLO   Take 667 mg by mouth 3 times daily (with meals)                                calcium carbonate 1250 MG tablet   Commonly known as:  OS-SHIRA 500 mg [place] [place]   Take 500 mg by mouth 2 times daily                                cefdinir 300 MG capsule   Commonly known as:  OMNICEF   Take 1 capsule (300 mg) by mouth every 48 hours                                DIALYVITE Tabs   Take  by mouth.                                lactulose 10 GM/15ML solution   Commonly known as:  CHRONULAC   Take 10 g by mouth as needed for constipation                                levETIRAcetam 500 MG tablet   Commonly known as:  KEPPRA   Take 1 tablet (500 mg) by mouth daily                                LISINOPRIL PO   Take 20 mg by mouth daily                                metoprolol 25 MG 24 hr tablet   Commonly known as:  TOPROL-XL   Take 1 tablet (25 mg) by mouth daily                                nitroglycerin 0.4 MG sublingual tablet   Commonly known as:  NITROSTAT   Place 0.4 mg under the tongue every 5 minutes as needed for  chest pain For chest pain place 1 tablet under the tongue every 5 minutes for 3 doses. If symptoms persist 5 minutes after 1st dose call 911.                                OMEPRAZOLE PO   Take 20 mg by mouth every morning                                PLAVIX PO   Take by mouth daily                                TYLENOL PO   Take 1,000 mg by mouth every 8 hours as needed                                Urea 40 % Crea   Externally apply  topically 2 times daily. As directed.                                VITAMIN D3 PO   Take 400 Units by mouth daily

## 2017-06-05 NOTE — PROGRESS NOTES
Pre procedure plan of care reviewed with pt and family prior to sedation with .  All questions answered.

## 2017-06-05 NOTE — PROGRESS NOTES
Care Suites Arrival    Reason for Visit: fistulagram  Arrival interventions:none    Pt resting in bed/recyliner, denies additional needs at this time, call light in reach.  Waiting for procedure.

## 2017-06-05 NOTE — IP AVS SNAPSHOT
[place] [address]    [place] [address]    Phone:  [phone]                                       After Visit Summary   6/5/2017    Angle Munguia    MRN: [id number]           After Visit Summary Signature Page     I have received my discharge instructions, and my questions have been answered. I have discussed any challenges I see with this plan with the nurse or doctor.    ..........................................................................................................................................  Patient/Patient Representative Signature      ..........................................................................................................................................  Patient Representative Print Name and Relationship to Patient    ..................................................               ................................................  Date                                            Time    ..........................................................................................................................................  Reviewed by Signature/Title    ...................................................              ..............................................  Date                                                            Time

## 2017-06-05 NOTE — PROGRESS NOTES
Pt returned from IR to [facility] via cart. Denies pain, nausea or vomiting.  Declines po food or fluids at this time.  Lunch tray sent with pt's family per request. Family aware that pt to dialysis at time of discharge.  D/C instructions reviewed prior to D/C to home with family driving.  All information reviewed with  present. Pt able to dangle and transfer to chair with stand by assist and tolerated well.  Fistula access site without bleeding or hematoma at time of D/C.

## 2017-06-05 NOTE — PROGRESS NOTES
Care Suites Discharge Summary    Discharge Criteria:   Discharge Criteria met per MD orders: Yes.   Vital signs stable.     Pt demonstrates ability to ambulate safely: Yes.  (See discharge questionnaire for additional information)    Discharge instructions & education:   Discharge instructions reviewed with patient and wife. Patient verbalizes  understanding.   Additional patient education provided:  none.     Medications:   Patient will be discharging on new medications- No. Patient verbalizes reason for use, start date, and side effects NA.    Items returned to patient:   Home and hospital acquired medications returned to patient N/A   Listed belongings gathered and returned to patient: Yes    Patient discharged to home via wheelchair with wife driving.    Rebeca Reed

## 2017-06-05 NOTE — DISCHARGE INSTRUCTIONS
Fistulagram Discharge Instructions     After you go home:      You may resume your normal diet    Have an adult stay with you for 6 hours if you received sedation       For 24 hours - due to the sedation you received:    Relax and take it easy    Do NOT make any important or legal decisions    Do NOT drive or operate machines at home or at work    Do NOT drink alcohol    Care of Puncture Site:      For the first 48 hrs, check your puncture site every couple hours while you are awake     You may remove/change the bandage tomorrow    You may shower tomorrow    No tub baths, whirlpools or swimming until your puncture site has fully healed    If puncture site starts to bleed - apply light pressure to site for 5 minutes or until bleeding stops     Activity       You should try to elevate your arm for the remainder of today to prevent increased swelling    You may go back to your normal activity in 24 hours     Wait 48 hours before lifting, straining, exercise or other strenuous activity    Medicines:      You may resume all your medications    For minor pain, you may take Acetaminophen (Tylenol) or Ibuprofen (Advil)                 Call the provider who ordered this procedure if:      Increased pain or a large or growing hard lump around the site    Blood or fluid is draining from the site    The site is red, swollen, hot or tender    Chills or a fever greater than 101 F (38 C)    Pain that is getting worse    Any questions or concerns      Call  911 or go to the Emergency Room if:    Severe pain or trouble breathing    Bleeding that you cannot control      If you have questions call:          [facility] Radiology Dept @ [phone]        The provider who performed your procedure was ___Dr Denny______________.

## 2017-06-05 NOTE — PROCEDURES
RADIOLOGY PROCEDURE NOTE  Patient name: Angle Munguia  MRN: [id number]  : 1935    Pre-procedure diagnosis: LUE swelling  Post-procedure diagnosis: Same    Procedure Date/Time: 2017  9:02 AM  Procedure: 8 mm PTA of upper arm fistula.  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  No complications.  Hypertensive, though tolerated well.  Estimated blood loss: < 5 ml  Specimen(s) collected with description:  Sheath removed  The patient tolerated the procedure well with no immediate complications.  Significant findings:  Please see above.    See imaging dictation for procedural details.    Provider name: Adonay Villar  Assistant(s):None

## 2017-08-14 ENCOUNTER — TRANSFERRED RECORDS (OUTPATIENT)
Dept: HEALTH INFORMATION MANAGEMENT | Facility: CLINIC | Age: 82
End: 2017-08-14

## 2017-08-15 RX ORDER — LIDOCAINE 40 MG/G
CREAM TOPICAL
Status: CANCELLED | OUTPATIENT
Start: 2017-08-15

## 2017-08-17 ENCOUNTER — HOSPITAL ENCOUNTER (OUTPATIENT)
Facility: CLINIC | Age: 82
Discharge: HOME | End: 2017-08-17
Attending: RADIOLOGY | Admitting: RADIOLOGY
Payer: MEDICARE

## 2017-08-17 ENCOUNTER — HOSPITAL ENCOUNTER (INPATIENT)
Dept: INTERVENTIONAL RADIOLOGY/VASCULAR | Facility: CLINIC | Age: 82
End: 2017-08-17
Attending: INTERNAL MEDICINE | Admitting: RADIOLOGY
Payer: MEDICARE

## 2017-08-17 VITALS
OXYGEN SATURATION: 96 % | DIASTOLIC BLOOD PRESSURE: 64 MMHG | TEMPERATURE: 98.5 F | WEIGHT: 120 LBS | HEART RATE: 91 BPM | BODY MASS INDEX: 23.44 KG/M2 | SYSTOLIC BLOOD PRESSURE: 163 MMHG | RESPIRATION RATE: 16 BRPM

## 2017-08-17 VITALS
DIASTOLIC BLOOD PRESSURE: 77 MMHG | SYSTOLIC BLOOD PRESSURE: 158 MMHG | OXYGEN SATURATION: 96 % | RESPIRATION RATE: 14 BRPM

## 2017-08-17 DIAGNOSIS — Z99.2 END STAGE RENAL DISEASE ON DIALYSIS (H): ICD-10-CM

## 2017-08-17 DIAGNOSIS — N18.6 END STAGE RENAL DISEASE ON DIALYSIS (H): ICD-10-CM

## 2017-08-17 LAB — INR PPP: 1.1 (ref 0.86–1.14)

## 2017-08-17 PROCEDURE — 27210845 ZZH DEVICE INFLATION CR5

## 2017-08-17 PROCEDURE — 85610 PROTHROMBIN TIME: CPT | Performed by: RADIOLOGY

## 2017-08-17 PROCEDURE — 25000128 H RX IP 250 OP 636: Performed by: NURSE PRACTITIONER

## 2017-08-17 PROCEDURE — 99153 MOD SED SAME PHYS/QHP EA: CPT

## 2017-08-17 PROCEDURE — 25000128 H RX IP 250 OP 636: Performed by: RADIOLOGY

## 2017-08-17 PROCEDURE — 27210742 ZZH CATH CR1

## 2017-08-17 PROCEDURE — 27210906 ZZH KIT CR8

## 2017-08-17 PROCEDURE — C1769 GUIDE WIRE: HCPCS

## 2017-08-17 PROCEDURE — C1725 CATH, TRANSLUMIN NON-LASER: HCPCS

## 2017-08-17 PROCEDURE — 40000854 ZZH STATISTIC SIMPLE TUBE INSERTION/CHARGE, PORT, CATH, FISTULOGRAM

## 2017-08-17 PROCEDURE — 27210886 ZZH ACCESSORY CR5

## 2017-08-17 PROCEDURE — 36415 COLL VENOUS BLD VENIPUNCTURE: CPT | Performed by: RADIOLOGY

## 2017-08-17 PROCEDURE — 27210804 ZZH SHEATH CR3

## 2017-08-17 RX ORDER — NALOXONE HYDROCHLORIDE 0.4 MG/ML
.1-.4 INJECTION, SOLUTION INTRAMUSCULAR; INTRAVENOUS; SUBCUTANEOUS
Status: DISCONTINUED | OUTPATIENT
Start: 2017-08-17 | End: 2017-08-17 | Stop reason: HOSPADM

## 2017-08-17 RX ORDER — LIDOCAINE 40 MG/G
CREAM TOPICAL
Status: DISCONTINUED | OUTPATIENT
Start: 2017-08-17 | End: 2017-08-17 | Stop reason: HOSPADM

## 2017-08-17 RX ORDER — IOPAMIDOL 612 MG/ML
50 INJECTION, SOLUTION INTRAVASCULAR ONCE
Status: COMPLETED | OUTPATIENT
Start: 2017-08-17 | End: 2017-08-17

## 2017-08-17 RX ORDER — HEPARIN SODIUM 1000 [USP'U]/ML
INJECTION, SOLUTION INTRAVENOUS; SUBCUTANEOUS
Status: DISCONTINUED
Start: 2017-08-17 | End: 2017-08-17 | Stop reason: HOSPADM

## 2017-08-17 RX ORDER — FENTANYL CITRATE 50 UG/ML
INJECTION, SOLUTION INTRAMUSCULAR; INTRAVENOUS
Status: DISCONTINUED
Start: 2017-08-17 | End: 2017-08-17 | Stop reason: HOSPADM

## 2017-08-17 RX ORDER — FLUMAZENIL 0.1 MG/ML
0.2 INJECTION, SOLUTION INTRAVENOUS
Status: DISCONTINUED | OUTPATIENT
Start: 2017-08-17 | End: 2017-08-17 | Stop reason: HOSPADM

## 2017-08-17 RX ORDER — FENTANYL CITRATE 50 UG/ML
25-50 INJECTION, SOLUTION INTRAMUSCULAR; INTRAVENOUS EVERY 5 MIN PRN
Status: DISCONTINUED | OUTPATIENT
Start: 2017-08-17 | End: 2017-08-17 | Stop reason: HOSPADM

## 2017-08-17 RX ORDER — LIDOCAINE HYDROCHLORIDE 10 MG/ML
1-30 INJECTION, SOLUTION EPIDURAL; INFILTRATION; INTRACAUDAL; PERINEURAL
Status: DISCONTINUED | OUTPATIENT
Start: 2017-08-17 | End: 2017-08-17 | Stop reason: HOSPADM

## 2017-08-17 RX ADMIN — IOPAMIDOL 50 ML: 612 INJECTION, SOLUTION INTRAVENOUS at 10:58

## 2017-08-17 RX ADMIN — MIDAZOLAM HYDROCHLORIDE 1 MG: 1 INJECTION, SOLUTION INTRAMUSCULAR; INTRAVENOUS at 10:06

## 2017-08-17 RX ADMIN — MIDAZOLAM HYDROCHLORIDE 1 MG: 1 INJECTION, SOLUTION INTRAMUSCULAR; INTRAVENOUS at 10:19

## 2017-08-17 RX ADMIN — FENTANYL CITRATE 50 MCG: 50 INJECTION, SOLUTION INTRAMUSCULAR; INTRAVENOUS at 10:06

## 2017-08-17 RX ADMIN — FENTANYL CITRATE 50 MCG: 50 INJECTION, SOLUTION INTRAMUSCULAR; INTRAVENOUS at 10:19

## 2017-08-17 NOTE — PROGRESS NOTES
Interventional Radiology Pre-Procedure Sedation Assessment   Time of Assessment: 9:48 AM    Expected Level: Moderate Sedation    Indication: Sedation is required for the following type of Procedure: Left upper arm fistula fistulagram with possible intervention which could include angioplasty and/or stent placement    Sedation and procedural consent: Risks, benefits and alternatives were discussed with Patient and Spouse, , Dr Luis Eduardo RAMOS Intake: Appropriately NPO for procedure    ASA Class: Class 2 - MILD SYSTEMIC DISEASE, NO ACUTE PROBLEMS, NO FUNCTIONAL LIMITATIONS.    Mallampati: Grade 2:  Soft palate, base of uvula, tonsillar pillars, and portion of posterior pharyngeal wall visible    Lungs: Clear to auscultation upper lobes, coarse/some crackles lower lobes    Heart: Normal heart sounds and rate    History and physical reviewed and no updates needed. I have reviewed the lab findings, diagnostic data, medications, and the plan for sedation. I have determined this patient to be an appropriate candidate for the planned sedation and procedure and have reassessed the patient IMMEDIATELY PRIOR to sedation and procedure.    Jasmina Morales, OTILIO CNP

## 2017-08-17 NOTE — DISCHARGE INSTRUCTIONS
Fistulagram Discharge Instructions     After you go home:      You may resume your normal diet    Have an adult stay with you for 6 hours if you received sedation       For 24 hours - due to the sedation you received:    Relax and take it easy    Do NOT make any important or legal decisions    Do NOT drive or operate machines at home or at work    Do NOT drink alcohol    Care of Puncture Site:      For the first 48 hrs, check your puncture site every couple hours while you are awake     You may remove/change the bandage tomorrow    You may shower tomorrow    No tub baths, whirlpools or swimming until your puncture site has fully healed    If puncture site starts to bleed - apply light pressure to site for 5 minutes or until bleeding stops     Activity       You should try to elevate your arm for the remainder of today to prevent increased swelling    You may go back to your normal activity in 24 hours     Wait 48 hours before lifting, straining, exercise or other strenuous activity    Medicines:      You may resume all your medications    For minor pain, you may take Acetaminophen (Tylenol) or Ibuprofen (Advil)                 Call the provider who ordered this procedure if:      Increased pain or a large or growing hard lump around the site    Blood or fluid is draining from the site    The site is red, swollen, hot or tender    Chills or a fever greater than 101 F (38 C)    Pain that is getting worse    Any questions or concerns      Call  911 or go to the Emergency Room if:    Severe pain or trouble breathing    Bleeding that you cannot control      If you have questions call:          [facility] Dept @ [phone]

## 2017-08-17 NOTE — IP AVS SNAPSHOT
[place] [address]    [place] [address]    Phone:  [phone]                                       After Visit Summary   8/17/2017    Angle Munguia    MRN: [id number]           After Visit Summary Signature Page     I have received my discharge instructions, and my questions have been answered. I have discussed any challenges I see with this plan with the nurse or doctor.    ..........................................................................................................................................  Patient/Patient Representative Signature      ..........................................................................................................................................  Patient Representative Print Name and Relationship to Patient    ..................................................               ................................................  Date                                            Time    ..........................................................................................................................................  Reviewed by Signature/Title    ...................................................              ..............................................  Date                                                            Time

## 2017-08-17 NOTE — PROGRESS NOTES
Interventional Radiology Intra-procedural Nursing Note    Patient Name: Angle Munguia  Medical Record Number: [id number]  Today's Date: August 17, 2017    Start Time: 1014  End of procedure time: 1055  Procedure: Left Upper Extremity Fistulogram with possible intervention  Report given to: Tatyana MALDONADO, Care Suites  Time pt departs:  1115  :  with patient the entire case    Other Notes: Patient arrived into IR suite 2 via cart at 0957 with . Patient awake and alert to all spheres, non-english speaking. VSS, normal sinus rhythm. Patient moved to table. Prepped and draped with 2% chlorhexidine to left arm. Dr. Hoff in room at 1013, timeout and procedure started. Angioplasty to fistula. Patient tolerated procedure well. Versed 2 mg and 100 fentanyl mcg given. Debrief with Dr. Hoff, angioplasty, no complications. Dressing to fistula clean, dry and intact. Good thrill. Patient back to care suites via cart with . Bedside report given.    Norma Lizarraga RN

## 2017-08-17 NOTE — IP AVS SNAPSHOT
MRN:[id number]                      After Visit Summary   8/17/2017    Angle Munguia    MRN: [id number]           Visit Information        Department      8/17/2017  8:30 AM [facility]          Review of your medicines      UNREVIEWED medicines. Ask your doctor about these medicines        Dose / Directions    ALLOPURINOL PO        Dose:  100 mg   Take 100 mg by mouth daily   Refills:  0       AMLODIPINE BESYLATE PO        Dose:  5 mg   Take 5 mg by mouth 2 times daily   Refills:  0       calcium acetate 667 MG Caps capsule   Commonly known as:  PHOSLO        Dose:  667 mg   Take 667 mg by mouth 3 times daily (with meals)   Refills:  0       calcium carbonate 1250 MG tablet   Commonly known as:  OS-SHIRA 500 mg [place]. Ca        Dose:  500 mg   Take 500 mg by mouth 2 times daily   Refills:  0       cefdinir 300 MG capsule   Commonly known as:  OMNICEF   Indication:  HCAP   Used for:  Community acquired pneumonia        Dose:  300 mg   Take 1 capsule (300 mg) by mouth every 48 hours   Quantity:  5 capsule   Refills:  0       DIALYVITE Tabs        Take  by mouth.   Refills:  0       lactulose 10 GM/15ML solution   Commonly known as:  CHRONULAC        Dose:  10 g   Take 10 g by mouth as needed for constipation   Refills:  0       levETIRAcetam 500 MG tablet   Commonly known as:  KEPPRA        Dose:  500 mg   Take 1 tablet (500 mg) by mouth daily   Quantity:  30 tablet   Refills:  0       LISINOPRIL PO        Dose:  20 mg   Take 20 mg by mouth daily   Refills:  0       metoprolol 25 MG 24 hr tablet   Commonly known as:  TOPROL-XL   Used for:  Atrial fibrillation (H)        Dose:  25 mg   Take 1 tablet (25 mg) by mouth daily   Quantity:  30 tablet   Refills:  0       nitroGLYcerin 0.4 MG sublingual tablet   Commonly known as:  NITROSTAT        Dose:  0.4 mg   Place 0.4 mg under the tongue every 5 minutes as needed for chest pain For chest pain place 1 tablet under the tongue every 5  minutes for 3 doses. If symptoms persist 5 minutes after 1st dose call 911.   Refills:  0       OMEPRAZOLE PO        Dose:  20 mg   Take 20 mg by mouth every morning   Refills:  0       PLAVIX PO        Take by mouth daily   Refills:  0       TYLENOL PO        Dose:  1000 mg   Take 1,000 mg by mouth every 8 hours as needed   Refills:  0       Urea 40 % Crea   Used for:  Xerosis cutis        Externally apply  topically 2 times daily. As directed.   Quantity:  180 g   Refills:  3       VITAMIN D3 PO        Dose:  400 Units   Take 400 Units by mouth daily   Refills:  0                Protect others around you: Learn how to safely use, store and throw away your medicines at www.disposemymeds.org.         Follow-ups after your visit        Your next 10 appointments already scheduled     Sep 12, 2017 10:15 AM CDT   RETURN GENERAL with Tanner Bates MD   Eye Clinic ([facility])    [address]  [address]  [address]  [facility] [address]   [phone]               Care Instructions        Further instructions from your care team       Fistulagram Discharge Instructions     After you go home:      You may resume your normal diet    Have an adult stay with you for 6 hours if you received sedation       For 24 hours - due to the sedation you received:    Relax and take it easy    Do NOT make any important or legal decisions    Do NOT drive or operate machines at home or at work    Do NOT drink alcohol    Care of Puncture Site:      For the first 48 hrs, check your puncture site every couple hours while you are awake     You may remove/change the bandage tomorrow    You may shower tomorrow    No tub baths, whirlpools or swimming until your puncture site has fully healed    If puncture site starts to bleed - apply light pressure to site for 5 minutes or until bleeding stops     Activity       You should try to elevate your arm for the remainder of today to prevent increased  "swelling    You may go back to your normal activity in 24 hours     Wait 48 hours before lifting, straining, exercise or other strenuous activity    Medicines:      You may resume all your medications    For minor pain, you may take Acetaminophen (Tylenol) or Ibuprofen (Advil)                 Call the provider who ordered this procedure if:      Increased pain or a large or growing hard lump around the site    Blood or fluid is draining from the site    The site is red, swollen, hot or tender    Chills or a fever greater than 101 F (38 C)    Pain that is getting worse    Any questions or concerns      Call  911 or go to the Emergency Room if:    Severe pain or trouble breathing    Bleeding that you cannot control      If you have questions call:          [facility] Radiology Dept @ [phone]                   Additional Information About Your Visit        [email or web address] Information     [employer] lets you send messages to your doctor, view your test results, renew your prescriptions, schedule appointments and more. To sign up, go to [email or web address] . Click on \"Log in\" on the left side of the screen, which will take you to the Welcome page. Then click on \"Sign up Now\" on the right side of the page.     You will be asked to enter the access code listed below, as well as some personal information. Please follow the directions to create your username and password.     Your access code is: [email or web address]  Expires: 9/3/2017  9:48 AM     Your access code will  in 90 days. If you need help or a new code, please call your [facility] clinic or [phone].        Care EveryWhere ID     This is your Care EveryWhere ID. This could be used by other organizations to access your [facility] medical records  [id number]        Your Vitals Were     Blood Pressure Pulse Temperature Respirations Weight Pulse Oximetry    174/87 (BP Location: Right arm) 91 98.5  F (36.9  C) (Oral) 16 54.4 kg (120 lb) 95%    BMI (Body Mass " Index)                   23.44 kg/m2            Primary Care Provider Office Phone # Fax #    La Nena Garcia [phone]      Equal Access to Services     CHAOYMARYANN MARIA : Kale kady car philippe Dozier, waalma rosada luqadaha, qajuanpablota kaalmada avel, dayo gil daronabimael hernandez lamitzikarla juaquin. So [facility] [phone].    ATENCIÓN: Si habla español, tiene a nolan disposición servicios gratuitos de asistencia lingüística. Llame al [phone].    We comply with applicable federal civil rights laws and [place] laws. We do not discriminate on the basis of race, color, national origin, age, disability sex, sexual orientation or gender identity.            Thank you!     Thank you for choosing [facility] for your care. Our goal is always to provide you with excellent care. Hearing back from our patients is one way we can continue to improve our services. Please take a few minutes to complete the written survey that you may receive in the mail after you visit with us. Thank you!             Medication List: This is a list of all your medications and when to take them. Check marks below indicate your daily home schedule. Keep this list as a reference.      Medications           Morning Afternoon Evening Bedtime As Needed    ALLOPURINOL PO   Take 100 mg by mouth daily                                AMLODIPINE BESYLATE PO   Take 5 mg by mouth 2 times daily                                calcium acetate 667 MG Caps capsule   Commonly known as:  PHOSLO   Take 667 mg by mouth 3 times daily (with meals)                                calcium carbonate 1250 MG tablet   Commonly known as:  OS-SHIRA 500 mg [place]. Ca   Take 500 mg by mouth 2 times daily                                cefdinir 300 MG capsule   Commonly known as:  OMNICEF   Take 1 capsule (300 mg) by mouth every 48 hours                                DIALYVITE Tabs   Take  by mouth.                                lactulose 10 GM/15ML solution   Commonly known as:   CHRONULAC   Take 10 g by mouth as needed for constipation                                levETIRAcetam 500 MG tablet   Commonly known as:  KEPPRA   Take 1 tablet (500 mg) by mouth daily                                LISINOPRIL PO   Take 20 mg by mouth daily                                metoprolol 25 MG 24 hr tablet   Commonly known as:  TOPROL-XL   Take 1 tablet (25 mg) by mouth daily                                nitroGLYcerin 0.4 MG sublingual tablet   Commonly known as:  NITROSTAT   Place 0.4 mg under the tongue every 5 minutes as needed for chest pain For chest pain place 1 tablet under the tongue every 5 minutes for 3 doses. If symptoms persist 5 minutes after 1st dose call 911.                                OMEPRAZOLE PO   Take 20 mg by mouth every morning                                PLAVIX PO   Take by mouth daily                                TYLENOL PO   Take 1,000 mg by mouth every 8 hours as needed                                Urea 40 % Crea   Externally apply  topically 2 times daily. As directed.                                VITAMIN D3 PO   Take 400 Units by mouth daily

## 2017-08-17 NOTE — PROGRESS NOTES
Returned from IR ~ 1120, /77, 90, 93%,16. Denies pain via . Drsg to left fistula CDI, + thrill. Taking juice, declined anything to eat.

## 2017-08-17 NOTE — PROCEDURES
RADIOLOGY PROCEDURE NOTE  Patient name: Angle Munguia  MRN: [id number]  : 1935    Pre-procedure diagnosis: arm swelling.   Post-procedure diagnosis: Same    Procedure Date/Time: 2017  10:57 AM  Procedure: left upper extremity fistulogram with angioplasty of the subclavian, brachiocephalic and superior vena cava stent.   Estimated blood loss: None  Specimen(s) collected with description: none  The patient tolerated the procedure well with no immediate complications.  Significant findings:none    See imaging dictation for procedural details.    Provider name: Josselin Hoff  Assistant(s):None

## 2017-08-31 ENCOUNTER — HOSPITAL ENCOUNTER (EMERGENCY)
Facility: CLINIC | Age: 82
Discharge: HOME | End: 2017-08-31
Attending: EMERGENCY MEDICINE | Admitting: EMERGENCY MEDICINE
Payer: MEDICARE

## 2017-08-31 VITALS
RESPIRATION RATE: 19 BRPM | OXYGEN SATURATION: 100 % | DIASTOLIC BLOOD PRESSURE: 70 MMHG | WEIGHT: 130.29 LBS | TEMPERATURE: 98.7 F | SYSTOLIC BLOOD PRESSURE: 163 MMHG | BODY MASS INDEX: 25.45 KG/M2

## 2017-08-31 DIAGNOSIS — Z99.2 ESRD (END STAGE RENAL DISEASE) ON DIALYSIS (H): ICD-10-CM

## 2017-08-31 DIAGNOSIS — S06.5XAA SUBDURAL HEMATOMA (H): ICD-10-CM

## 2017-08-31 DIAGNOSIS — N18.6 ESRD (END STAGE RENAL DISEASE) ON DIALYSIS (H): ICD-10-CM

## 2017-08-31 LAB
ABO + RH BLD: NORMAL
ABO + RH BLD: NORMAL
ALBUMIN SERPL-MCNC: 3.5 G/DL (ref 3.4–5)
ALP SERPL-CCNC: 78 U/L (ref 40–150)
ALT SERPL W P-5'-P-CCNC: 18 U/L (ref 0–70)
ANION GAP SERPL CALCULATED.3IONS-SCNC: 5 MMOL/L (ref 3–14)
AST SERPL W P-5'-P-CCNC: 8 U/L (ref 0–45)
BASOPHILS # BLD AUTO: 0 10E9/L (ref 0–0.2)
BASOPHILS NFR BLD AUTO: 0.5 %
BILIRUB SERPL-MCNC: 0.6 MG/DL (ref 0.2–1.3)
BLD GP AB SCN SERPL QL: NORMAL
BLOOD BANK CMNT PATIENT-IMP: NORMAL
BUN SERPL-MCNC: 35 MG/DL (ref 7–30)
CALCIUM SERPL-MCNC: 8.6 MG/DL (ref 8.5–10.1)
CHLORIDE SERPL-SCNC: 93 MMOL/L (ref 94–109)
CO2 SERPL-SCNC: 35 MMOL/L (ref 20–32)
CREAT SERPL-MCNC: 6.1 MG/DL (ref 0.66–1.25)
DIFFERENTIAL METHOD BLD: ABNORMAL
EOSINOPHIL # BLD AUTO: 0.2 10E9/L (ref 0–0.7)
EOSINOPHIL NFR BLD AUTO: 3 %
ERYTHROCYTE [DISTWIDTH] IN BLOOD BY AUTOMATED COUNT: 15.6 % (ref 10–15)
GFR SERPL CREATININE-BSD FRML MDRD: 9 ML/MIN/1.7M2
GLUCOSE BLDC GLUCOMTR-MCNC: 93 MG/DL (ref 70–99)
GLUCOSE SERPL-MCNC: 94 MG/DL (ref 70–99)
HCT VFR BLD AUTO: 34.5 % (ref 40–53)
HGB BLD-MCNC: 10.9 G/DL (ref 13.3–17.7)
IMM GRANULOCYTES # BLD: 0 10E9/L (ref 0–0.4)
IMM GRANULOCYTES NFR BLD: 0.2 %
INR PPP: 1.17 (ref 0.86–1.14)
LYMPHOCYTES # BLD AUTO: 1.1 10E9/L (ref 0.8–5.3)
LYMPHOCYTES NFR BLD AUTO: 17.7 %
MCH RBC QN AUTO: 28.7 PG (ref 26.5–33)
MCHC RBC AUTO-ENTMCNC: 31.6 G/DL (ref 31.5–36.5)
MCV RBC AUTO: 91 FL (ref 78–100)
MONOCYTES # BLD AUTO: 0.6 10E9/L (ref 0–1.3)
MONOCYTES NFR BLD AUTO: 9.6 %
NEUTROPHILS # BLD AUTO: 4.2 10E9/L (ref 1.6–8.3)
NEUTROPHILS NFR BLD AUTO: 69 %
NRBC # BLD AUTO: 0 10*3/UL
NRBC BLD AUTO-RTO: 0 /100
PLATELET # BLD AUTO: 200 10E9/L (ref 150–450)
POTASSIUM SERPL-SCNC: 4.1 MMOL/L (ref 3.4–5.3)
PROT SERPL-MCNC: 8 G/DL (ref 6.8–8.8)
RBC # BLD AUTO: 3.8 10E12/L (ref 4.4–5.9)
SODIUM SERPL-SCNC: 132 MMOL/L (ref 133–144)
SPECIMEN EXP DATE BLD: NORMAL
WBC # BLD AUTO: 6.1 10E9/L (ref 4–11)

## 2017-08-31 PROCEDURE — 86850 RBC ANTIBODY SCREEN: CPT | Performed by: INTERNAL MEDICINE

## 2017-08-31 PROCEDURE — 85610 PROTHROMBIN TIME: CPT | Performed by: INTERNAL MEDICINE

## 2017-08-31 PROCEDURE — 99283 EMERGENCY DEPT VISIT LOW MDM: CPT

## 2017-08-31 PROCEDURE — 86901 BLOOD TYPING SEROLOGIC RH(D): CPT | Performed by: INTERNAL MEDICINE

## 2017-08-31 PROCEDURE — 86900 BLOOD TYPING SEROLOGIC ABO: CPT | Performed by: INTERNAL MEDICINE

## 2017-08-31 PROCEDURE — 85025 COMPLETE CBC W/AUTO DIFF WBC: CPT | Performed by: INTERNAL MEDICINE

## 2017-08-31 PROCEDURE — 80053 COMPREHEN METABOLIC PANEL: CPT | Performed by: INTERNAL MEDICINE

## 2017-08-31 PROCEDURE — 99284 EMERGENCY DEPT VISIT MOD MDM: CPT | Mod: Z6 | Performed by: INTERNAL MEDICINE

## 2017-08-31 PROCEDURE — 00000146 ZZHCL STATISTIC GLUCOSE BY METER IP

## 2017-08-31 ASSESSMENT — ENCOUNTER SYMPTOMS
WEAKNESS: 1
HEADACHES: 1
SHORTNESS OF BREATH: 0
FEVER: 0
ABDOMINAL PAIN: 0
NUMBNESS: 0

## 2017-08-31 NOTE — ED NOTES
Angle presents from clinic with concerns for a SDH on head CT today. His wife states he had been having a headache for two weeks. She denies that he has had any confusion, difficulty walking, speaking, eating or seeing, but he appears weak on the left side and is confused. He can not state his  or the date. Denies falls, head injury or other trauma.  present for triage. Hx of diabetes, CAD and kidney disease. On Plavix.

## 2017-08-31 NOTE — ED PROVIDER NOTES
History     Chief Complaint   Patient presents with     Headache     [place] [facility] [place]     Angle Munguia is a 82 year old male with a history of ESRD on HD with LUE fistula c/b complicated by stenosis s/p IR stenting (subclavian, innominate, basilic, brachiocephalic veins, most recent venoplasty 8/17/17) who is transferred from the [facility] where he underwent head CT concerning for findings consistent with mixed-age hematoma. The patient reports that 2 weeks ago he developed headaches that have persisted. Soon after, he also developed bilateral leg weakness and associated difficulty ambulating. He was seen in clinic today where he underwent CT head without contrast, which revealed mixed-acute, predominantly chronic left-sided subdural hematoma with mild to moderate associated local mass effect, and he was thus transferred to here for evaluation. The patient really does not have any other complaints besides the above. The patient denies any focal deficit in the upper extremities. He reports he's had no recent fall or other trauma. He denies any loss of control of bowel or bladder. The patient reports that he is anticoagulated on Plavix, INR 8/17/17 was 1.10.    No current facility-administered medications for this encounter.      Current Outpatient Prescriptions   Medication     nitroglycerin (NITROSTAT) 0.4 MG sublingual tablet     LISINOPRIL PO     AMLODIPINE BESYLATE PO     ALLOPURINOL PO     levETIRAcetam (KEPPRA) 500 MG tablet     cefdinir (OMNICEF) 300 MG capsule     lactulose (CHRONULAC) 10 GM/15ML solution     calcium carbonate (OS-SHIRA 500 MG [place]. [place]) 500 MG tablet     Acetaminophen (TYLENOL PO)     calcium acetate (PHOSLO) 667 MG CAPS     OMEPRAZOLE PO     metoprolol (TOPROL-XL) 25 MG 24 hr tablet     Cholecalciferol (VITAMIN D3 PO)     Urea 40 % CREA     B Complex-C-Folic Acid (DIALYVITE) TABS     Past Medical History:   Diagnosis Date     Chronic kidney  disease      Dermatochalasis      Dialysis patient (H)      Dry eye syndrome      Gastric ulcer      Gastric ulcer      Glaucoma (increased eye pressure)      Glaucoma suspect      Gout      Hypertension      Nonsenile cataract      Retinal detachment     LE, now NLP      Seizures (H)      Type 2 diabetes mellitus without complications (H)        Past Surgical History:   Procedure Laterality Date     AV FISTULA OR GRAFT ARTERIAL      right arm     C PLACE CATH AV DIALYSIS SHUNT       CATARACT IOL, RT/LT Bilateral 2011     ESOPHAGOSCOPY, GASTROSCOPY, DUODENOSCOPY (EGD), COMBINED  1/30/2014    Procedure: COMBINED ESOPHAGOSCOPY, GASTROSCOPY, DUODENOSCOPY (EGD), BIOPSY SINGLE OR MULTIPLE;;  Surgeon: Ashlyn Friedman MD;  Location:  GI     ESOPHAGOSCOPY, GASTROSCOPY, DUODENOSCOPY (EGD), COMBINED  3/27/2014    Procedure: COMBINED ESOPHAGOSCOPY, GASTROSCOPY, DUODENOSCOPY (EGD), BIOPSY SINGLE OR MULTIPLE;;  Surgeon: Ashlyn Friedman MD;  Location:  GI     UPPER GI ENDOSCOPY  3/27/14       Family History   Problem Relation Age of Onset     Glaucoma No family hx of      Macular Degeneration No family hx of        Social History   Substance Use Topics     Smoking status: Never Smoker     Smokeless tobacco: Not on file     Alcohol use No      No Known Allergies      I have reviewed the Medications, Allergies, Past Medical and Surgical History, and Social History in the Epic system.    Review of Systems   Constitutional: Negative for fever.   Respiratory: Negative for shortness of breath.    Cardiovascular: Negative for chest pain.   Gastrointestinal: Negative for abdominal pain.        Neg for loss of bowel control   Genitourinary:        Neg for loss of bladder control   Neurological: Positive for weakness (legs) and headaches. Negative for numbness.   All other systems reviewed and are negative.      Physical Exam   BP: 131/75  Heart Rate: 66  Temp: 98.7  F (37.1  C)  Resp: 18  Weight: 59.1 kg (130  lb 4.7 oz)  SpO2: 100 %  Physical Exam   Constitutional: He is oriented to person, place, and time. No distress.   HENT:   Head: Atraumatic.   Mouth/Throat: Oropharynx is clear and moist. No oropharyngeal exudate.   Eyes: Pupils are equal, round, and reactive to light. No scleral icterus.   Neck: Neck supple. No JVD present.   Cardiovascular: Normal rate, normal heart sounds and intact distal pulses.  Exam reveals no gallop and no friction rub.    No murmur heard.  Pulmonary/Chest: Effort normal and breath sounds normal. No respiratory distress. He has no wheezes. He has no rales. He exhibits no tenderness.   Abdominal: Soft. Bowel sounds are normal. He exhibits no distension and no mass. There is no tenderness. There is no rebound and no guarding.   Musculoskeletal: He exhibits no edema or tenderness.   Neurological: He is alert and oriented to person, place, and time. No cranial nerve deficit. Coordination normal.   Skin: Skin is warm. No rash noted. He is not diaphoretic.       ED Course     ED Course     Procedures        No results found for this visit on 08/31/17 (from the past 24 hour(s)).        Labs Ordered and Resulted from Time of ED Arrival Up to the Time of Departure from the ED   CBC WITH PLATELETS DIFFERENTIAL - Abnormal; Notable for the following:        Result Value    RBC Count 3.80 (*)     Hemoglobin 10.9 (*)     Hematocrit 34.5 (*)     RDW 15.6 (*)     All other components within normal limits   INR - Abnormal; Notable for the following:     INR 1.17 (*)     All other components within normal limits   COMPREHENSIVE METABOLIC PANEL - Abnormal; Notable for the following:     Sodium 132 (*)     Chloride 93 (*)     Carbon Dioxide 35 (*)     Urea Nitrogen 35 (*)     Creatinine 6.10 (*)     GFR Estimate 9 (*)     GFR Estimate If Black 11 (*)     All other components within normal limits   GLUCOSE BY METER   ABO/RH TYPE AND SCREEN       Consults  Neurosurgery: At Bedside (08/31/17 1216)  Other  (Comment): Responded (08/31/17 7648)    Assessments & Plan (with Medical Decision Making)  Mixed acute but mostly chronic SDH on CT in the pt with ESRD on HD, on plavix for access clott in the past, Neurosurg consult done-stop plavix and DC with follow up in 2 weeks. Renal contacted since pt's wife worried that HD not happeining as she recalled if he does not take plavix, they will not do HD. Renal called and discovered that his primary Nephrologist is Arpit Sanchez at [facility] and case was also discussed with him who assured that he will communicate to Dialysis Center and should not be a problem.       I have reviewed the nursing notes.    I have reviewed the findings, diagnosis, plan and need for follow up with the patient.    Discharge Medication List as of 8/31/2017  2:50 PM          Final diagnoses:   Subdural hematoma (H)   ESRD (end stage renal disease) on dialysis (H)     ILewis, am serving as a trained medical scribe to document services personally performed by Brock Gloria MD, based on the provider's statements to me.      Brock REAL MD, was physically present and have reviewed and verified the accuracy of this note documented by Lewis Browning.    8/31/2017   [facility], [place], EMERGENCY DEPARTMENT     Brock Gloria MD  09/06/17 0623

## 2017-08-31 NOTE — ED AVS SNAPSHOT
[facility], Emergency Department    [address] [address]    Phone:  [phone]                                       Angle Munguia   MRN: [id number]    Department:  [facility], Emergency Department   Date of Visit:  8/31/2017           Patient Information     Date Of Birth          1935        Your diagnoses for this visit were:     Subdural hematoma (H)     ESRD (end stage renal disease) on dialysis (H)        You were seen by Rosalinda Simons MD and Brock Gloria MD.        Discharge Instructions       Please stop taking plavix as recommended by Neurosurgery and ok'ed by your Nephrologist Dr Hyatt who will call your outpatient Dialysys to be aware and make an appointment to follow up with Your Nephrologist Dr Hyatt, Your Primary Care Provider as soon as possible and Neurosurgery Clinic (phone: [phone]) in 14 days even if entirely better.     Future Appointments        Provider Department Dept Phone Center    9/12/2017 10:15 AM Tanner Bates MD [facility] Clinic [phone] [facility]      24 Hour Appointment Hotline       To make an appointment at any [facility], call [phone] ([phone]). If you don't have a family doctor or clinic, we will help you find one. [facility] clinics are conveniently located to serve the needs of you and your family.             Review of your medicines      Our records show that you are taking the medicines listed below. If these are incorrect, please call your family doctor or clinic.        Dose / Directions Last dose taken    ALLOPURINOL PO   Dose:  100 mg        Take 100 mg by mouth daily   Refills:  0        AMLODIPINE BESYLATE PO   Dose:  5 mg        Take 5 mg by mouth 2 times daily   Refills:  0        calcium acetate 667 MG Caps capsule   Commonly known as:  PHOSLO   Dose:  667 mg        Take 667 mg by mouth 3 times daily (with meals)   Refills:  0        calcium carbonate 1250 MG tablet   Commonly known as:  OS-SHIRA  500 mg [place]. Ca   Dose:  500 mg        Take 500 mg by mouth 2 times daily   Refills:  0        cefdinir 300 MG capsule   Commonly known as:  OMNICEF   Dose:  300 mg   Indication:  HCAP   Quantity:  5 capsule        Take 1 capsule (300 mg) by mouth every 48 hours   Refills:  0        DIALYVITE Tabs        Take  by mouth.   Refills:  0        lactulose 10 GM/15ML solution   Commonly known as:  CHRONULAC   Dose:  10 g        Take 10 g by mouth as needed for constipation   Refills:  0        levETIRAcetam 500 MG tablet   Commonly known as:  KEPPRA   Dose:  500 mg   Quantity:  30 tablet        Take 1 tablet (500 mg) by mouth daily   Refills:  0        LISINOPRIL PO   Dose:  20 mg        Take 20 mg by mouth daily   Refills:  0        metoprolol 25 MG 24 hr tablet   Commonly known as:  TOPROL-XL   Dose:  25 mg   Quantity:  30 tablet        Take 1 tablet (25 mg) by mouth daily   Refills:  0        nitroGLYcerin 0.4 MG sublingual tablet   Commonly known as:  NITROSTAT   Dose:  0.4 mg        Place 0.4 mg under the tongue every 5 minutes as needed for chest pain For chest pain place 1 tablet under the tongue every 5 minutes for 3 doses. If symptoms persist 5 minutes after 1st dose call 911.   Refills:  0        OMEPRAZOLE PO   Dose:  20 mg        Take 20 mg by mouth every morning   Refills:  0        TYLENOL PO   Dose:  1000 mg        Take 1,000 mg by mouth every 8 hours as needed   Refills:  0        Urea 40 % Crea   Quantity:  180 g        Externally apply  topically 2 times daily. As directed.   Refills:  3        VITAMIN D3 PO   Dose:  400 Units        Take 400 Units by mouth daily   Refills:  0          STOP taking        Dose Reason for stopping Comments    PLAVIX PO                      Procedures and tests performed during your visit     ABO/Rh type and screen    CBC with platelets differential    Comprehensive metabolic panel    Glucose by meter    INR      Orders Needing Specimen Collection     None      Pending  "Results     No orders found from 2017 to 2017.            Pending Culture Results     No orders found from 2017 to 2017.            Pending Results Instructions     If you had any lab results that were not finalized at the time of your Discharge, you can call the ED Lab Result RN at [phone]. You will be contacted by this team for any positive Lab results or changes in treatment. The nurses are available 7 days a week from 10A to 6:30P.  You can leave a message 24 hours per day and they will return your call.        Thank you for choosing [facility]       Thank you for choosing [facility] for your care. Our goal is always to provide you with excellent care. Hearing back from our patients is one way we can continue to improve our services. Please take a few minutes to complete the written survey that you may receive in the mail after you visit with us. Thank you!        [employer] Information     [employer] lets you send messages to your doctor, view your test results, renew your prescriptions, schedule appointments and more. To sign up, go to [email or web address] . Click on \"Log in\" on the left side of the screen, which will take you to the Welcome page. Then click on \"Sign up Now\" on the right side of the page.     You will be asked to enter the access code listed below, as well as some personal information. Please follow the directions to create your username and password.     Your access code is: [email or web address]  Expires: 9/3/2017  9:48 AM     Your access code will  in 90 days. If you need help or a new code, please call your [facility] clinic or [phone].        Care EveryWhere ID     This is your Care EveryWhere ID. This could be used by other organizations to access your [facility] medical records  [id number]        Equal Access to Services     EDMOND SIFUENTES : shayla Polacno, dayo briones. So wameagan " [phone].    ATENCIÓN: Si habla español, tiene a nolan disposición servicios gratuitos de asistencia lingüística. Llame al [phone].    We comply with applicable federal civil rights laws and [place] laws. We do not discriminate on the basis of race, color, national origin, age, disability sex, sexual orientation or gender identity.            After Visit Summary       This is your record. Keep this with you and show to your community pharmacist(s) and doctor(s) at your next visit.

## 2017-08-31 NOTE — DISCHARGE INSTRUCTIONS
Please stop taking plavix as recommended by Neurosurgery and ok'ed by your Nephrologist Dr Hyatt who will call your outpatient Dialysys to be aware and make an appointment to follow up with Your Nephrologist Dr Hyatt, Your Primary Care Provider as soon as possible and Neurosurgery Clinic (phone: [phone]) in 14 days even if entirely better.

## 2017-08-31 NOTE — ED AVS SNAPSHOT
[facility], [place], Emergency Department    [address] [place] [address]    Phone:  [phone]                                       Angle Munguia   MRN: [id number]    Department:  [facility], Emergency Department   Date of Visit:  8/31/2017           After Visit Summary Signature Page     I have received my discharge instructions, and my questions have been answered. I have discussed any challenges I see with this plan with the nurse or doctor.    ..........................................................................................................................................  Patient/Patient Representative Signature      ..........................................................................................................................................  Patient Representative Print Name and Relationship to Patient    ..................................................               ................................................  Date                                            Time    ..........................................................................................................................................  Reviewed by Signature/Title    ...................................................              ..............................................  Date                                                            Time

## 2017-08-31 NOTE — CONSULTS
General acute hospital    NEUROSURGERY CONSULTATION NOTE    This consultation was requested by Dr. Gloria from the ED service.    Reason for Consultation: Subdural hematoma    HPI: History obtained via  and EMR.   Mr. Munguia is an 82 year old right handed, Hmong speaking male who presented to the [facility] ED after a 2 week period of a stable headache. He states that the headache began slowly 2 weeks ago and has been stable since, it is localized mainly to the left side of his head, it is pretty persistent, and he rates it as a 4/10 in terms of severity. He has not taken any medication for it. He denies a temporal or positional component for the pain. He denies any recent trauma. He was seen at the [facility] for this headache today and he underwent a non-contrasted head CT which demonstrated 2 chronic subdurals: one in the left frontal convexity and the other in the high parieto-occipital area. Of note, he is currently on Plavix. He states that he has has some incoordination with his bilateral lower extremities. He denies any other symptoms.      PAST MEDICAL HISTORY:   Past Medical History:   Diagnosis Date     Chronic kidney disease      Dermatochalasis      Dialysis patient (H)      Dry eye syndrome      Gastric ulcer      Gastric ulcer      Glaucoma (increased eye pressure)      Glaucoma suspect      Gout      Hypertension      Nonsenile cataract      Retinal detachment     LE, now NLP      Seizures (H)      Type 2 diabetes mellitus without complications (H)      PAST SURGICAL HISTORY:   Past Surgical History:   Procedure Laterality Date     AV FISTULA OR GRAFT ARTERIAL      right arm     C PLACE CATH AV DIALYSIS SHUNT       CATARACT IOL, RT/LT Bilateral 2011     ESOPHAGOSCOPY, GASTROSCOPY, DUODENOSCOPY (EGD), COMBINED  1/30/2014    Procedure: COMBINED ESOPHAGOSCOPY, GASTROSCOPY, DUODENOSCOPY (EGD), BIOPSY SINGLE OR MULTIPLE;;  Surgeon: Ashlyn Friedman MD;   Location:  GI     ESOPHAGOSCOPY, GASTROSCOPY, DUODENOSCOPY (EGD), COMBINED  3/27/2014    Procedure: COMBINED ESOPHAGOSCOPY, GASTROSCOPY, DUODENOSCOPY (EGD), BIOPSY SINGLE OR MULTIPLE;;  Surgeon: Ashlyn Friedman MD;  Location:  GI     UPPER GI ENDOSCOPY  3/27/14     FAMILY HISTORY:   Family History   Problem Relation Age of Onset     Glaucoma No family hx of      Macular Degeneration No family hx of      SOCIAL HISTORY:   Social History   Substance Use Topics     Smoking status: Never Smoker     Smokeless tobacco: Not on file     Alcohol use No     MEDICATIONS:  Current Outpatient Prescriptions   Medication Sig Dispense Refill     nitroglycerin (NITROSTAT) 0.4 MG sublingual tablet Place 0.4 mg under the tongue every 5 minutes as needed for chest pain For chest pain place 1 tablet under the tongue every 5 minutes for 3 doses. If symptoms persist 5 minutes after 1st dose call 911.       Clopidogrel Bisulfate (PLAVIX PO) Take by mouth daily       LISINOPRIL PO Take 20 mg by mouth daily       AMLODIPINE BESYLATE PO Take 5 mg by mouth 2 times daily       ALLOPURINOL PO Take 100 mg by mouth daily       levETIRAcetam (KEPPRA) 500 MG tablet Take 1 tablet (500 mg) by mouth daily 30 tablet 0     cefdinir (OMNICEF) 300 MG capsule Take 1 capsule (300 mg) by mouth every 48 hours 5 capsule 0     lactulose (CHRONULAC) 10 GM/15ML solution Take 10 g by mouth as needed for constipation       calcium carbonate (OS-SHIRA 500 MG [place]. [place]) 500 MG tablet Take 500 mg by mouth 2 times daily       Acetaminophen (TYLENOL PO) Take 1,000 mg by mouth every 8 hours as needed        calcium acetate (PHOSLO) 667 MG CAPS Take 667 mg by mouth 3 times daily (with meals)       OMEPRAZOLE PO Take 20 mg by mouth every morning       metoprolol (TOPROL-XL) 25 MG 24 hr tablet Take 1 tablet (25 mg) by mouth daily (Patient taking differently: Take 50 mg by mouth 2 times daily ) 30 tablet      Cholecalciferol (VITAMIN D3 PO) Take 400 Units by  mouth daily        Urea 40 % CREA Externally apply  topically 2 times daily. As directed. 180 g 3     B Complex-C-Folic Acid (DIALYVITE) TABS Take  by mouth.       Allergies:  No Known Allergies    PE:  Blood pressure 142/80, temperature 98.7  F (37.1  C), temperature source Oral, resp. rate 19, weight 59.1 kg (130 lb 4.7 oz), SpO2 96 %.    NEUROLOGIC:  -- Awake; Alert; oriented x 3  -- Follows commands. Some difficulty following commands as there is a significant language barrier, despite the .    -- Speech fluent, spontaneous - assumed to be as patient is Hmong speaking only.   -- no gaze preference. No apparent hemineglect.  Cranial Nerves:  -- visual fields full to confrontation, bilateral surgical pupils, s/p cataract surgery. Left eyelid with mild edema - apparently baseline since cataract surgery.   -- face symmetrical, tongue midline  -   -- sensory V1-V3 intact bilaterally  -- palate elevates symmetrically, uvula midline  -- Trapezii 5/5 strength bilat symmetric      Motor:  Cachetic older male. Generalized weakness.     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     ASSESSMENT: Mr. Munguia is an 82 year old male with a chronic subdural.     RECOMMENDATIONS:  - Hold plavix for now  - Normonatremia  - Keppra 500mg BID  - CT scan and follow-up in neurosurgery clinic in 2 weeks.     The patient was discussed with the neurosurgery chief resident, who agrees with the above outlined plan.    Contact the neurosurgery resident on call with questions.    Dial * * *127: Enter [phone] when prompted.   Murray Chakraborty MD, PhD  Neurosurgery PGY-3    I have reviewed the history above and agree with the resident's assessment and plan.  TAHIR Solo MD

## 2017-09-11 DIAGNOSIS — H40.009 GLAUCOMA SUSPECT: Primary | ICD-10-CM

## 2017-10-06 ENCOUNTER — APPOINTMENT (OUTPATIENT)
Dept: ULTRASOUND IMAGING | Facility: CLINIC | Age: 82
End: 2017-10-06
Attending: INTERNAL MEDICINE
Payer: MEDICARE

## 2017-10-06 ENCOUNTER — HOSPITAL ENCOUNTER (EMERGENCY)
Facility: CLINIC | Age: 82
Discharge: HOME | End: 2017-10-06
Attending: INTERNAL MEDICINE | Admitting: INTERNAL MEDICINE
Payer: MEDICARE

## 2017-10-06 VITALS — OXYGEN SATURATION: 96 % | SYSTOLIC BLOOD PRESSURE: 167 MMHG | DIASTOLIC BLOOD PRESSURE: 67 MMHG | TEMPERATURE: 98.4 F
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
## 2017-10-06 DIAGNOSIS — T82.838A BLEEDING FROM DIALYSIS SHUNT, INITIAL ENCOUNTER (H): ICD-10-CM

## 2017-10-06 LAB
ALBUMIN SERPL-MCNC: 3.6 G/DL (ref 3.4–5)
ALP SERPL-CCNC: 68 U/L (ref 40–150)
ALT SERPL W P-5'-P-CCNC: 17 U/L (ref 0–70)
ANION GAP SERPL CALCULATED.3IONS-SCNC: 10 MMOL/L (ref 3–14)
AST SERPL W P-5'-P-CCNC: 10 U/L (ref 0–45)
BASOPHILS # BLD AUTO: 0 10E9/L (ref 0–0.2)
BASOPHILS NFR BLD AUTO: 0.5 %
BILIRUB SERPL-MCNC: 0.8 MG/DL (ref 0.2–1.3)
BUN SERPL-MCNC: 15 MG/DL (ref 7–30)
CA-I BLD-SCNC: 4.3 MG/DL (ref 4.4–5.2)
CALCIUM SERPL-MCNC: 8.7 MG/DL (ref 8.5–10.1)
CHLORIDE SERPL-SCNC: 94 MMOL/L (ref 94–109)
CO2 BLDCOV-SCNC: 36 MMOL/L (ref 21–28)
CO2 SERPL-SCNC: 32 MMOL/L (ref 20–32)
CREAT SERPL-MCNC: 4.04 MG/DL (ref 0.66–1.25)
DIFFERENTIAL METHOD BLD: ABNORMAL
EOSINOPHIL # BLD AUTO: 0.1 10E9/L (ref 0–0.7)
EOSINOPHIL NFR BLD AUTO: 2.5 %
ERYTHROCYTE [DISTWIDTH] IN BLOOD BY AUTOMATED COUNT: 16.1 % (ref 10–15)
GFR SERPL CREATININE-BSD FRML MDRD: 14 ML/MIN/1.7M2
GLUCOSE BLD-MCNC: 95 MG/DL (ref 70–99)
GLUCOSE SERPL-MCNC: 96 MG/DL (ref 70–99)
HCT VFR BLD AUTO: 36.7 % (ref 40–53)
HCT VFR BLD CALC: 38 %PCV (ref 40–53)
HGB BLD CALC-MCNC: 12.9 G/DL (ref 13.3–17.7)
HGB BLD-MCNC: 11.4 G/DL (ref 13.3–17.7)
IMM GRANULOCYTES # BLD: 0 10E9/L (ref 0–0.4)
IMM GRANULOCYTES NFR BLD: 0.2 %
INR PPP: 1.18 (ref 0.86–1.14)
LYMPHOCYTES # BLD AUTO: 0.7 10E9/L (ref 0.8–5.3)
LYMPHOCYTES NFR BLD AUTO: 16.5 %
MCH RBC QN AUTO: 27.7 PG (ref 26.5–33)
MCHC RBC AUTO-ENTMCNC: 31.1 G/DL (ref 31.5–36.5)
MCV RBC AUTO: 89 FL (ref 78–100)
MONOCYTES # BLD AUTO: 0.5 10E9/L (ref 0–1.3)
MONOCYTES NFR BLD AUTO: 11.8 %
NEUTROPHILS # BLD AUTO: 3 10E9/L (ref 1.6–8.3)
NEUTROPHILS NFR BLD AUTO: 68.5 %
NRBC # BLD AUTO: 0 10*3/UL
NRBC BLD AUTO-RTO: 0 /100
PCO2 BLDV: 46 MM HG (ref 40–50)
PH BLDV: 7.5 PH (ref 7.32–7.43)
PLATELET # BLD AUTO: 158 10E9/L (ref 150–450)
PO2 BLDV: 19 MM HG (ref 25–47)
POTASSIUM BLD-SCNC: 3.7 MMOL/L (ref 3.4–5.3)
POTASSIUM SERPL-SCNC: 3.7 MMOL/L (ref 3.4–5.3)
PROT SERPL-MCNC: 7.9 G/DL (ref 6.8–8.8)
RBC # BLD AUTO: 4.11 10E12/L (ref 4.4–5.9)
SAO2 % BLDV FROM PO2: 35 %
SODIUM BLD-SCNC: 136 MMOL/L (ref 133–144)
SODIUM SERPL-SCNC: 136 MMOL/L (ref 133–144)
WBC # BLD AUTO: 4.4 10E9/L (ref 4–11)

## 2017-10-06 PROCEDURE — 93990 DOPPLER FLOW TESTING: CPT

## 2017-10-06 PROCEDURE — 40000501 ZZHCL STATISTIC HEMATOCRIT ED POCT

## 2017-10-06 PROCEDURE — 85610 PROTHROMBIN TIME: CPT | Performed by: EMERGENCY MEDICINE

## 2017-10-06 PROCEDURE — 99284 EMERGENCY DEPT VISIT MOD MDM: CPT | Mod: Z6 | Performed by: INTERNAL MEDICINE

## 2017-10-06 PROCEDURE — 40000502 ZZHCL STATISTIC GLUCOSE ED POCT

## 2017-10-06 PROCEDURE — 82330 ASSAY OF CALCIUM: CPT

## 2017-10-06 PROCEDURE — 82803 BLOOD GASES ANY COMBINATION: CPT

## 2017-10-06 PROCEDURE — 99284 EMERGENCY DEPT VISIT MOD MDM: CPT | Mod: 25 | Performed by: INTERNAL MEDICINE

## 2017-10-06 PROCEDURE — 40000497 ZZHCL STATISTIC SODIUM ED POCT

## 2017-10-06 PROCEDURE — 85025 COMPLETE CBC W/AUTO DIFF WBC: CPT | Performed by: EMERGENCY MEDICINE

## 2017-10-06 PROCEDURE — 40000498 ZZHCL STATISTIC POTASSIUM ED POCT

## 2017-10-06 PROCEDURE — 80053 COMPREHEN METABOLIC PANEL: CPT | Performed by: EMERGENCY MEDICINE

## 2017-10-06 ASSESSMENT — ENCOUNTER SYMPTOMS
ADENOPATHY: 0
COUGH: 0
SHORTNESS OF BREATH: 0
BACK PAIN: 0
DIFFICULTY URINATING: 0
CONFUSION: 0
NECK PAIN: 0
WEAKNESS: 1
CHILLS: 0
ABDOMINAL PAIN: 0
FATIGUE: 1

## 2017-10-06 NOTE — ED AVS SNAPSHOT
[facility], [place], Emergency Department    [address] [place] [address]    Phone:  [phone]                                       Angle Munguia   MRN: [id number]    Department:  [facility], Emergency Department   Date of Visit:  10/6/2017           After Visit Summary Signature Page     I have received my discharge instructions, and my questions have been answered. I have discussed any challenges I see with this plan with the nurse or doctor.    ..........................................................................................................................................  Patient/Patient Representative Signature      ..........................................................................................................................................  Patient Representative Print Name and Relationship to Patient    ..................................................               ................................................  Date                                            Time    ..........................................................................................................................................  Reviewed by Signature/Title    ...................................................              ..............................................  Date                                                            Time

## 2017-10-06 NOTE — ED AVS SNAPSHOT
[facility], Emergency Department    [address] [address]    Phone:  [phone]                                       Angle Munguia   MRN: [id number]    Department:  [facility], Emergency Department   Date of Visit:  10/6/2017           Patient Information     Date Of Birth          1935        Your diagnoses for this visit were:     Bleeding from dialysis shunt, initial encounter (H)        You were seen by Jame Langley MD.      Follow-up Information     Follow up with La Nena Garcia MD.    Specialty:  Internal Medicine    Contact information:    [facility]  [address] [facility] [address] [phone]          Discharge Instructions       Please follow up for dialysis as scheduled next week.  Return if the bleeding restarts and is not controlled with pressure.      24 Hour Appointment Hotline       To make an appointment at any [facility] clinic, call [phone] ([phone]). If you don't have a family doctor or clinic, we will help you find one. [facility] clinics are conveniently located to serve the needs of you and your family.             Review of your medicines      Our records show that you are taking the medicines listed below. If these are incorrect, please call your family doctor or clinic.        Dose / Directions Last dose taken    ALLOPURINOL PO   Dose:  100 mg        Take 100 mg by mouth daily   Refills:  0        AMLODIPINE BESYLATE PO   Dose:  5 mg        Take 5 mg by mouth 2 times daily   Refills:  0        calcium acetate 667 MG Caps capsule   Commonly known as:  PHOSLO   Dose:  667 mg        Take 667 mg by mouth 3 times daily (with meals)   Refills:  0        calcium carbonate 1250 MG tablet   Commonly known as:  OS-SHIRA 500 mg [place]. [place]   Dose:  500 mg        Take 500 mg by mouth 2 times daily   Refills:  0        cefdinir 300 MG capsule   Commonly known as:  OMNICEF   Dose:  300 mg   Indication:  HCAP   Quantity:   5 capsule        Take 1 capsule (300 mg) by mouth every 48 hours   Refills:  0        DIALYVITE Tabs        Take  by mouth.   Refills:  0        lactulose 10 GM/15ML solution   Commonly known as:  CHRONULAC   Dose:  10 g        Take 10 g by mouth as needed for constipation   Refills:  0        levETIRAcetam 500 MG tablet   Commonly known as:  KEPPRA   Dose:  500 mg   Quantity:  30 tablet        Take 1 tablet (500 mg) by mouth daily   Refills:  0        LISINOPRIL PO   Dose:  20 mg        Take 20 mg by mouth daily   Refills:  0        metoprolol 25 MG 24 hr tablet   Commonly known as:  TOPROL-XL   Dose:  25 mg   Quantity:  30 tablet        Take 1 tablet (25 mg) by mouth daily   Refills:  0        nitroGLYcerin 0.4 MG sublingual tablet   Commonly known as:  NITROSTAT   Dose:  0.4 mg        Place 0.4 mg under the tongue every 5 minutes as needed for chest pain For chest pain place 1 tablet under the tongue every 5 minutes for 3 doses. If symptoms persist 5 minutes after 1st dose call 911.   Refills:  0        OMEPRAZOLE PO   Dose:  20 mg        Take 20 mg by mouth every morning   Refills:  0        TYLENOL PO   Dose:  1000 mg        Take 1,000 mg by mouth every 8 hours as needed   Refills:  0        Urea 40 % Crea   Quantity:  180 g        Externally apply  topically 2 times daily. As directed.   Refills:  3        VITAMIN D3 PO   Dose:  400 Units        Take 400 Units by mouth daily   Refills:  0                Procedures and tests performed during your visit     CBC with platelets differential    Comprehensive metabolic panel    INR    ISTAT gases elec ica gluc mallorie POCT    Peripheral IV: Standard    US Ext Arterial Venous Dialys Acs Graft      Orders Needing Specimen Collection     None      Pending Results     No orders found from 10/4/2017 to 10/7/2017.            Pending Culture Results     No orders found from 10/4/2017 to 10/7/2017.            Pending Results Instructions     If you had any lab results that  "were not finalized at the time of your Discharge, you can call the ED Lab Result RN at [phone]. You will be contacted by this team for any positive Lab results or changes in treatment. The nurses are available 7 days a week from 10A to 6:30P.  You can leave a message 24 hours per day and they will return your call.        Thank you for choosing [facility]       Thank you for choosing [facility] for your care. Our goal is always to provide you with excellent care. Hearing back from our patients is one way we can continue to improve our services. Please take a few minutes to complete the written survey that you may receive in the mail after you visit with us. Thank you!        [email or web address] Information     [employer] lets you send messages to your doctor, view your test results, renew your prescriptions, schedule appointments and more. To sign up, go to [email or web address] . Click on \"Log in\" on the left side of the screen, which will take you to the Welcome page. Then click on \"Sign up Now\" on the right side of the page.     You will be asked to enter the access code listed below, as well as some personal information. Please follow the directions to create your username and password.     Your access code is: [email or web address]  Expires: 12/10/2017  6:30 AM     Your access code will  in 90 days. If you need help or a new code, please call your [facility] clinic or [phone].        Care EveryWhere ID     This is your Care EveryWhere ID. This could be used by other organizations to access your [facility] medical records  [id number]        Equal Access to Services     EDMOND SIFUENTES : Hadii kady paez Sojanel, waaxda luqadaha, qaybta kaalmadayo mejias . So [facility] [phone].    ATENCIÓN: Si habla español, tiene a nolan disposición servicios gratuitos de asistencia lingüística. Llame al [phone].    We comply with applicable federal civil rights laws and [place] laws. We do " not discriminate on the basis of race, color, national origin, age, disability, sex, sexual orientation, or gender identity.            After Visit Summary       This is your record. Keep this with you and show to your community pharmacist(s) and doctor(s) at your next visit.

## 2017-10-06 NOTE — ED PROVIDER NOTES
History     Chief Complaint   Patient presents with     Coagulation Disorder     bleeding from shunt.     HPI  Angle Munguia is a 82 year old male who presents with excessive bleeding post dialysis from his left forearm dialysis graft. He completed the dialysis run without other difficulties. He has had no unusual bleeding. He has no chest pain, fever, cough, or shortness of breath. He has generalized weakness in his legs. He underwent routine dialysis today at [facility]. Bleeding did not resolve from the access site so he was sent here with pressure dressing and clamp.    PAST MEDICAL HISTORY:   Past Medical History:   Diagnosis Date     Chronic kidney disease      Dermatochalasis      Dialysis patient (H)      Dry eye syndrome      Gastric ulcer      Gastric ulcer      Glaucoma (increased eye pressure)      Glaucoma suspect      Gout      Hypertension      Nonsenile cataract      Retinal detachment     LE, now NLP      Seizures (H)      Type 2 diabetes mellitus without complications (H)        PAST SURGICAL HISTORY:   Past Surgical History:   Procedure Laterality Date     AV FISTULA OR GRAFT ARTERIAL      right arm     C PLACE CATH AV DIALYSIS SHUNT       CATARACT IOL, RT/LT Bilateral 2011     ESOPHAGOSCOPY, GASTROSCOPY, DUODENOSCOPY (EGD), COMBINED  1/30/2014    Procedure: COMBINED ESOPHAGOSCOPY, GASTROSCOPY, DUODENOSCOPY (EGD), BIOPSY SINGLE OR MULTIPLE;;  Surgeon: Ashlyn Friedman MD;  Location:  GI     ESOPHAGOSCOPY, GASTROSCOPY, DUODENOSCOPY (EGD), COMBINED  3/27/2014    Procedure: COMBINED ESOPHAGOSCOPY, GASTROSCOPY, DUODENOSCOPY (EGD), BIOPSY SINGLE OR MULTIPLE;;  Surgeon: Ashlyn Friedman MD;  Location:  GI     UPPER GI ENDOSCOPY  3/27/14       FAMILY HISTORY:   Family History   Problem Relation Age of Onset     Glaucoma No family hx of      Macular Degeneration No family hx of        SOCIAL HISTORY:   Social History   Substance Use Topics     Smoking status: Never Smoker      Smokeless tobacco: Not on file     Alcohol use No         I have reviewed the Medications, Allergies, Past Medical and Surgical History, and Social History in the Epic system.    Review of Systems   Constitutional: Positive for fatigue. Negative for chills.   HENT: Negative for congestion.    Eyes: Negative for visual disturbance.   Respiratory: Negative for cough and shortness of breath.    Cardiovascular: Negative for chest pain.   Gastrointestinal: Negative for abdominal pain.   Genitourinary: Negative for difficulty urinating.   Musculoskeletal: Negative for back pain and neck pain.   Skin: Negative for rash.   Neurological: Positive for weakness.   Hematological: Negative for adenopathy.   Psychiatric/Behavioral: Negative for confusion.       Physical Exam   BP: 168/78  Heart Rate: 80  Temp: 97.9  F (36.6  C)  SpO2: 96 %  Physical Exam   Constitutional: He is oriented to person, place, and time. He appears well-developed and well-nourished. No distress.   HENT:   Head: Normocephalic and atraumatic.   Right Ear: External ear normal.   Left Ear: External ear normal.   Nose: Nose normal.   Mouth/Throat: Oropharynx is clear and moist.   Eyes: EOM are normal. Pupils are equal, round, and reactive to light.   Neck: Normal range of motion.   Cardiovascular: Normal rate, regular rhythm and normal heart sounds.    No murmur heard.  Pulmonary/Chest: Effort normal and breath sounds normal. He has no wheezes. He has no rales.   Abdominal: Soft. Bowel sounds are normal. There is no tenderness. There is no rebound.   Musculoskeletal: He exhibits no edema.   Left forearm dialysis site with no active bleeding. Clamp removed. Site dry.   Neurological: He is alert and oriented to person, place, and time.   Skin: Skin is warm and dry.   Psychiatric: His behavior is normal.   Nursing note and vitals reviewed.      ED Course     ED Course     Procedures        Labs/Imaging    Results for orders placed or performed during the  hospital encounter of 10/06/17 (from the past 24 hour(s))   CBC with platelets differential   Result Value Ref Range    WBC 4.4 4.0 - 11.0 10e9/L    RBC Count 4.11 (L) 4.4 - 5.9 10e12/L    Hemoglobin 11.4 (L) 13.3 - 17.7 g/dL    Hematocrit 36.7 (L) 40.0 - 53.0 %    MCV 89 78 - 100 fl    MCH 27.7 26.5 - 33.0 pg    MCHC 31.1 (L) 31.5 - 36.5 g/dL    RDW 16.1 (H) 10.0 - 15.0 %    Platelet Count 158 150 - 450 10e9/L    Diff Method Automated Method     % Neutrophils 68.5 %    % Lymphocytes 16.5 %    % Monocytes 11.8 %    % Eosinophils 2.5 %    % Basophils 0.5 %    % Immature Granulocytes 0.2 %    Nucleated RBCs 0 0 /100    Absolute Neutrophil 3.0 1.6 - 8.3 10e9/L    Absolute Lymphocytes 0.7 (L) 0.8 - 5.3 10e9/L    Absolute Monocytes 0.5 0.0 - 1.3 10e9/L    Absolute Eosinophils 0.1 0.0 - 0.7 10e9/L    Absolute Basophils 0.0 0.0 - 0.2 10e9/L    Abs Immature Granulocytes 0.0 0 - 0.4 10e9/L    Absolute Nucleated RBC 0.0    Comprehensive metabolic panel   Result Value Ref Range    Sodium 136 133 - 144 mmol/L    Potassium 3.7 3.4 - 5.3 mmol/L    Chloride 94 94 - 109 mmol/L    Carbon Dioxide 32 20 - 32 mmol/L    Anion Gap 10 3 - 14 mmol/L    Glucose 96 70 - 99 mg/dL    Urea Nitrogen 15 7 - 30 mg/dL    Creatinine 4.04 (H) 0.66 - 1.25 mg/dL    GFR Estimate 14 (L) >60 mL/min/1.7m2    GFR Estimate If Black 17 (L) >60 mL/min/1.7m2    Calcium 8.7 8.5 - 10.1 mg/dL    Bilirubin Total 0.8 0.2 - 1.3 mg/dL    Albumin 3.6 3.4 - 5.0 g/dL    Protein Total 7.9 6.8 - 8.8 g/dL    Alkaline Phosphatase 68 40 - 150 U/L    ALT 17 0 - 70 U/L    AST 10 0 - 45 U/L   INR   Result Value Ref Range    INR 1.18 (H) 0.86 - 1.14   ISTAT gases elec ica gluc mallorie POCT   Result Value Ref Range    Ph Venous 7.50 (H) 7.32 - 7.43 pH    PCO2 Venous 46 40 - 50 mm Hg    PO2 Venous 19 (L) 25 - 47 mm Hg    Bicarbonate Venous 36 (H) 21 - 28 mmol/L    O2 Sat Venous 35 %    Sodium 136 133 - 144 mmol/L    Potassium 3.7 3.4 - 5.3 mmol/L    Glucose 95 70 - 99 mg/dL    Calcium  Ionized 4.3 (L) 4.4 - 5.2 mg/dL    Hemoglobin 12.9 (L) 13.3 - 17.7 g/dL    Hematocrit - POCT 38 (L) 40.0 - 53.0 %PCV   US Ext Arterial Venous Dialys Acs Graft    Narrative    Exam: Duplex ultrasound of the left upper extremity brachiocephalic  fistula dated 10/6/2017 7:17 PM.    Comparison examination: 6/11/2015    Clinical history: Excessive bleeding from left forearm graft  Technique: Grayscale (B-mode), color and duplex Doppler ultrasound of  the arterial inflow, dialysis graft, and outflow vein obtained with  spectral waveform analysis. Velocity measurements obtained with angle  correction at or less than 60 degrees. Flow volumes obtained within a  segment of the venous outflow.     Findings:    Left extremity brachiocephalic fistula:    Brachial artery proximal to graft: 247, 251, 364, 250 cm/sec  Brachial artery distal to graft: 112 cm/sec, antegrade    Venous outflow graft: 433, 356, 227, 336 cm/s  Axillary vein: Patent with normal Doppler flow.    There are at least 2 pseudoaneurysms arising from the vein graft. The  largest measuring 4.4 x 2.4 x 2.2 cm, just beyond the arterial  anastomosis. An additional pseudoaneurysm, more proximal on the graft,  measures 4.3 x 2.6 x 2.4 cm.    Arterial anastomosis: 258, 94 cm/sec  Venous anastomosis: Patent (velocities were not recorded).    Axillary artery: 195 cm/sec      Impression    Impression:  1. Patent left upper extremity brachiocephalic fistula. Slightly  elevated velocities of the arterial inflow without evidence for  hemodynamically significant stenosis at the arterial anastomosis.  2. Pseudoaneurysms arising from the venous graft have increased in  size when compared with exam dated 6/11/2015, the largest measuring up  to 4.4 cm. There is persistent flow within the pseudoaneurysms.  3. The venous anastomosis is patent, though velocities were not  measured.    I have personally reviewed the examination and initial interpretation  and I agree with the  findings.    CHERYL GIRON MD       Assessments & Plan (with Medical Decision Making)   Impression:  Excessive bleeding from dialysis fistula following routine dialysis today. His bleeding is controlled on arrival in the ED. The hemoglobin, platelets and INR are OK. There is no significant acute change in the US appearance of the fistula. He had aneurism/pseudoaneurism of 3.8 cm and up to 4.3 cm in 2014. He has patent flow in the graft. His dialysis access bleeding was controlled with pressure and time.    I have reviewed the nursing notes.    I have reviewed the findings, diagnosis, plan and need for follow up with the patient.    New Prescriptions    No medications on file       Final diagnoses:   Bleeding from dialysis shunt, initial encounter (H)       10/6/2017   [facility], [place], EMERGENCY DEPARTMENT     Jame Langley MD  10/06/17 195

## 2017-10-06 NOTE — ED NOTES
82-yr male patient - arrives to [facility] with bleeding issues from shunt; has been clamped off x 2 hours.

## 2017-10-07 NOTE — DISCHARGE INSTRUCTIONS
Please follow up for dialysis as scheduled next week.  Return if the bleeding restarts and is not controlled with pressure.

## 2017-10-09 ENCOUNTER — TRANSFERRED RECORDS (OUTPATIENT)
Dept: HEALTH INFORMATION MANAGEMENT | Facility: CLINIC | Age: 82
End: 2017-10-09

## 2017-10-16 DIAGNOSIS — S06.5XAA SDH (SUBDURAL HEMATOMA) (H): Primary | ICD-10-CM

## 2017-11-13 ENCOUNTER — TRANSFERRED RECORDS (OUTPATIENT)
Dept: HEALTH INFORMATION MANAGEMENT | Facility: CLINIC | Age: 82
End: 2017-11-13

## 2017-11-14 ENCOUNTER — OFFICE VISIT (OUTPATIENT)
Dept: NEUROSURGERY | Facility: CLINIC | Age: 82
End: 2017-11-14

## 2017-11-14 VITALS — SYSTOLIC BLOOD PRESSURE: 166 MMHG | HEIGHT: 60 IN | HEART RATE: 72 BPM | DIASTOLIC BLOOD PRESSURE: 68 MMHG

## 2017-11-14 DIAGNOSIS — R41.82 ALTERED MENTAL STATUS: ICD-10-CM

## 2017-11-14 DIAGNOSIS — R40.4 TRANSIENT ALTERATION OF AWARENESS: Primary | ICD-10-CM

## 2017-11-14 LAB
ALBUMIN SERPL-MCNC: 4.2 G/DL (ref 3.4–5)
ALP SERPL-CCNC: 82 U/L (ref 40–150)
ALT SERPL W P-5'-P-CCNC: 18 U/L (ref 0–70)
ANION GAP SERPL CALCULATED.3IONS-SCNC: 7 MMOL/L (ref 3–14)
AST SERPL W P-5'-P-CCNC: 14 U/L (ref 0–45)
BASOPHILS # BLD AUTO: 0 10E9/L (ref 0–0.2)
BASOPHILS NFR BLD AUTO: 0.5 %
BILIRUB SERPL-MCNC: 0.8 MG/DL (ref 0.2–1.3)
BUN SERPL-MCNC: 36 MG/DL (ref 7–30)
CALCIUM SERPL-MCNC: 8.7 MG/DL (ref 8.5–10.1)
CHLORIDE SERPL-SCNC: 92 MMOL/L (ref 94–109)
CO2 SERPL-SCNC: 32 MMOL/L (ref 20–32)
CREAT SERPL-MCNC: 5.94 MG/DL (ref 0.66–1.25)
DIFFERENTIAL METHOD BLD: ABNORMAL
EOSINOPHIL # BLD AUTO: 0.1 10E9/L (ref 0–0.7)
EOSINOPHIL NFR BLD AUTO: 2.7 %
ERYTHROCYTE [DISTWIDTH] IN BLOOD BY AUTOMATED COUNT: 16.9 % (ref 10–15)
GFR SERPL CREATININE-BSD FRML MDRD: 9 ML/MIN/1.7M2
GLUCOSE SERPL-MCNC: 97 MG/DL (ref 70–99)
HCT VFR BLD AUTO: 36.1 % (ref 40–53)
HGB BLD-MCNC: 11.3 G/DL (ref 13.3–17.7)
IMM GRANULOCYTES # BLD: 0 10E9/L (ref 0–0.4)
IMM GRANULOCYTES NFR BLD: 0.2 %
LYMPHOCYTES # BLD AUTO: 1 10E9/L (ref 0.8–5.3)
LYMPHOCYTES NFR BLD AUTO: 22.9 %
MCH RBC QN AUTO: 28.3 PG (ref 26.5–33)
MCHC RBC AUTO-ENTMCNC: 31.3 G/DL (ref 31.5–36.5)
MCV RBC AUTO: 90 FL (ref 78–100)
MONOCYTES # BLD AUTO: 0.5 10E9/L (ref 0–1.3)
MONOCYTES NFR BLD AUTO: 10.9 %
NEUTROPHILS # BLD AUTO: 2.8 10E9/L (ref 1.6–8.3)
NEUTROPHILS NFR BLD AUTO: 62.8 %
NRBC # BLD AUTO: 0 10*3/UL
NRBC BLD AUTO-RTO: 1 /100
PLATELET # BLD AUTO: 139 10E9/L (ref 150–450)
POTASSIUM SERPL-SCNC: 4.6 MMOL/L (ref 3.4–5.3)
PROT SERPL-MCNC: 8.7 G/DL (ref 6.8–8.8)
RBC # BLD AUTO: 4 10E12/L (ref 4.4–5.9)
SODIUM SERPL-SCNC: 130 MMOL/L (ref 133–144)
WBC # BLD AUTO: 4.4 10E9/L (ref 4–11)

## 2017-11-14 ASSESSMENT — PAIN SCALES - GENERAL: PAINLEVEL: NO PAIN (0)

## 2017-11-14 NOTE — LETTER
11/14/2017    RE: Angle Munguia  [address]  [facility] [address]     Dear Dr. Garcia:    We saw Mr. Angle Munguia in [facility] today with his daughter, wife and a Hmong . He is a right-handed 82 year old man who was seen in the ED after two weeks of headache and lower extremity weakness. He underwent a CT of the head that revealed bilateral chronic subdural hematomas with etiology determined to be antiplatelet therapy while on dialysis. Since being discharged, he no longer has headaches though his daughter states his mobility and cognition are declining. He no longer walks or stands independently. He frequently repeats questions and is disorientated. He also had episodes of incontinence following his recent discharge. These are new developments beginning this summer, but his daughter appreciates gradual decline beginning 2-3 years ago. He stopped Plavix since the discovery of his hematomas. He denies fevers or dysuria.     On exam, he is somnolent, but easily aroused. He is oriented to name and place only. He appears to be speaking fluently and coherently. He has good strength in the upper extremities. He has antigravity strength in the lower extremities. He needs assistance to get out of the chair. While he was able to bear weight, he was unable to take any steps.    REVIEW OF STUDIES: We went over his recent CT scan. His left frontal and parietal convexity subdural hematomas have resolved. He has diffuse atrophy with appropriately size ventricles.    IMPRESSION AND PLAN: While there appear to be no active neurological issues, Mr. Munguia has clearly declined cognitively over the past few months. From our standpoint, he can go back on antiplatelet or anti-coagulation therapy. His altered mental status is concerning and a thorough work-up is indicated. Perhaps, this is related to his chronic kidney disease. We will start with basic labs and UA today and we will forward these results  to you for follow-up. We will see him back in our clinic as needed.  Please do not hesitate to contact us with questions. We will keep you informed of his progress.    RAIMUNDO LOBATO MD    I, Jc Lockett, am serving as a scribe to document services personally performed by Raimundo Lobato MD, based upon my observations and the provider's statements to me. All documentation has been reviewed by the aforementioned doctor prior to being entered into the official medical record.    I, Raimundo Lobato, attest that above named individual is acting in scribe capacity, has observed my performance of the services and has documented them in accordance with my direction. The documentation recorded by the scribe accurately reflects the service I personally performed and the decisions made by me.    Raimundo Lobato MD

## 2017-11-14 NOTE — MR AVS SNAPSHOT
After Visit Summary   11/14/2017    Angle Munguia    MRN: [id number]           Patient Information     Date Of Birth          1935        Visit Information        Provider Department      11/14/2017 12:00 PM Adan Solo MD; [facility] Neurosurgery        Today's Diagnoses     Altered mental status    -  1      Care Instructions    Lab results to be followed by Dr La Nena Garcia          Follow-ups after your visit        Follow-up notes from your care team     Return if symptoms worsen or fail to improve.      Your next 10 appointments already scheduled     Nov 15, 2017  8:30 AM CST   IR DIALYSIS FISTULOGRAM LEFT with SHIR1   [facility] Interventional Radiology ([facility])    [address] [place] [id number] [phone]           1. Do not eat any solid food or milk products for 6 hours prior to the exam. You may drink clear liquids until 2 hours prior to the exam. Clear liquids include the following: water, Jell-O, clear broth, apple juice, or any non-carbonated drink that you can see through (no pop/soda!). 2. If you have diabetes, check with your doctors to see if your insulin needs to be adjusted for the exam. 3. If you are taking an oral hypoglycemia agent used to control your glucose, this medication needs to be held on the morning of your exam, but may be taken after the exam when you resume eating. 4. The morning of the exam you may brush your teeth and take your other medication as directed with a sip of water. 5. It is important to tell the Radiology nurse if you have any allergies, especially to IV contrast material. 6. It is important to tell the Radiology nurse if you think you might be pregnant. 7. Make arrangements for someone to drive you home. A responsible adult must stay with you during the next 24 hours. 8. Bring a list of all drugs you are taking; include supplements and over-the-counter  medications. 9. Wear comfortable clothes and leave valuables at home.              Future tests that were ordered for you today     Open Future Orders        Priority Expected Expires Ordered    Comprehensive metabolic panel Today  2018    IR Dialysis Fistulogram Left Routine  2018            Who to contact     Please call your clinic at [phone] to:    Ask questions about your health    Make or cancel appointments    Discuss your medicines    Learn about your test results    Speak to your doctor   If you have compliments or concerns about an experience at your clinic, or if you wish to file a complaint, please contact [facility] Patient Relations at [phone] or email us at [email or web address]         Additional Information About Your Visit        MylaharYouxiduo Information     Kodable is an electronic gateway that provides easy, online access to your medical records. With Kodable, you can request a clinic appointment, read your test results, renew a prescription or communicate with your care team.     To sign up for Kodable visit the website at [email or web address]   You will be asked to enter the access code listed below, as well as some personal information. Please follow the directions to create your username and password.     Your access code is: [email or web address]  Expires: 12/10/2017  5:30 AM     Your access code will  in 90 days. If you need help or a new code, please contact your [facility] Clinic or call [phone] for assistance.        Care EveryWhere ID     This is your Care EveryWhere ID. This could be used by other organizations to access your [facility] medical records  [id number]        Your Vitals Were     Pulse Height                72 1.524 m (5')           Blood Pressure from Last 3 Encounters:   17 166/68   10/06/17 167/67   17 163/70    Weight from Last 3 Encounters:    08/31/17 59.1 kg (130 lb 4.7 oz)   08/17/17 54.4 kg (120 lb)   03/31/17 61.5 kg (135 lb 9.6 oz)              We Performed the Following     CBC with platelets differential [FLG210]     Routine UA with microscopic [GOH6334]          Today's Medication Changes          These changes are accurate as of: 11/14/17 12:40 PM.  If you have any questions, ask your nurse or doctor.               These medicines have changed or have updated prescriptions.        Dose/Directions    metoprolol 25 MG 24 hr tablet   Commonly known as:  TOPROL-XL   This may have changed:    - how much to take  - when to take this   Used for:  Atrial fibrillation (H)        Dose:  25 mg   Take 1 tablet (25 mg) by mouth daily   Quantity:  30 tablet   Refills:  0                Primary Care Provider Office Phone # Fax #    La Nena Garcia [phone]       [facility] [address] [facility] [address]        Equal Access to Services     EDMOND SIFUENTES AH: Hadii aad ku hadasho Soomaali, waaxda luqadaha, qaybta kaalmada adeegyada, waxay idiin haysvetlanan alfred mensah . So [facility] [phone].    ATENCIÓN: Si habla español, tiene a nolan disposición servicios gratuitos de asistencia lingüística. [facility] [phone].    We comply with applicable federal civil rights laws and [place] laws. We do not discriminate on the basis of race, color, national origin, age, disability, sex, sexual orientation, or gender identity.            Thank you!     Thank you for choosing [facility] NEUROSURGERY  for your care. Our goal is always to provide you with excellent care. Hearing back from our patients is one way we can continue to improve our services. Please take a few minutes to complete the written survey that you may receive in the mail after your visit with us. Thank you!             Your Updated Medication List - Protect others around you: Learn how to safely use, store and throw away your medicines at  www.disposemymeds.org.          This list is accurate as of: 11/14/17 12:40 PM.  Always use your most recent med list.                   Brand Name Dispense Instructions for use Diagnosis    ALLOPURINOL PO      Take 100 mg by mouth daily        AMLODIPINE BESYLATE PO      Take 5 mg by mouth 2 times daily        calcium acetate 667 MG Caps capsule    PHOSLO     Take 667 mg by mouth 3 times daily (with meals)        calcium carbonate 1250 MG tablet    OS-SHIRA 500 mg [place]. Ca     Take 500 mg by mouth 2 times daily        cefdinir 300 MG capsule    OMNICEF    5 capsule    Take 1 capsule (300 mg) by mouth every 48 hours    Community acquired pneumonia       DIALYVITE Tabs      Take  by mouth.        lactulose 10 GM/15ML solution    CHRONULAC     Take 10 g by mouth as needed for constipation        levETIRAcetam 500 MG tablet    KEPPRA    30 tablet    Take 1 tablet (500 mg) by mouth daily        LISINOPRIL PO      Take 20 mg by mouth daily        metoprolol 25 MG 24 hr tablet    TOPROL-XL    30 tablet    Take 1 tablet (25 mg) by mouth daily    Atrial fibrillation (H)       nitroGLYcerin 0.4 MG sublingual tablet    NITROSTAT     Place 0.4 mg under the tongue every 5 minutes as needed for chest pain For chest pain place 1 tablet under the tongue every 5 minutes for 3 doses. If symptoms persist 5 minutes after 1st dose call 911.        OMEPRAZOLE PO      Take 20 mg by mouth every morning        TYLENOL PO      Take 1,000 mg by mouth every 8 hours as needed        Urea 40 % Crea     180 g    Externally apply  topically 2 times daily. As directed.    Xerosis cutis       VITAMIN D3 PO      Take 400 Units by mouth daily

## 2017-11-14 NOTE — LETTER
11/14/2017       RE: Angle Munguia  [address]  [facility] [address]     Dear Colleague,    Thank you for referring your patient, Angle Munguia, to the [facility] NEUROSURGERY at [facility]. Please see a copy of my visit note below.    Dear Dr. Garcia:    We saw Mr. Angle Munguia in [facility] today with his daughter, wife and a Hmong . He is a right-handed 82 year old man who was seen in the ED after two weeks of headache and lower extremity weakness. He underwent a CT of the head that revealed bilateral chronic subdural hematomas with etiology determined to be antiplatelet therapy while on dialysis. Since being discharged, he no longer has headaches though his daughter states his mobility and cognition are declining. He no longer walks or stands independently. He frequently repeats questions and is disorientated. He also had episodes of incontinence following his recent discharge. These are new developments beginning this summer, but his daughter appreciates gradual decline beginning 2-3 years ago. He stopped Plavix since the discovery of his hematomas. He denies fevers or dysuria.     On exam, he is somnolent, but easily aroused. He is oriented to name and place only. He appears to be speaking fluently and coherently. He has good strength in the upper extremities. He has antigravity strength in the lower extremities. He needs assistance to get out of the chair. While he was able to bear weight, he was unable to take any steps.    REVIEW OF STUDIES: We went over his recent CT scan. His left frontal and parietal convexity subdural hematomas have resolved. He has diffuse atrophy with appropriately size ventricles.    IMPRESSION AND PLAN: While there appear to be no active neurological issues, Mr. Munguia has clearly declined cognitively over the past few months. From our standpoint, he can go back on antiplatelet or anti-coagulation therapy. His  altered mental status is concerning and a thorough work-up is indicated. Perhaps, this is related to his chronic kidney disease. We will start with basic labs and UA today and we will forward these results to you for follow-up. We will see him back in our clinic as needed.  Please do not hesitate to contact us with questions. We will keep you informed of his progress.    RAIMUNDO LOBATO MD    I, Jc Lockett, am serving as a scribe to document services personally performed by Raimundo Lobato MD, based upon my observations and the provider's statements to me. All documentation has been reviewed by the aforementioned doctor prior to being entered into the official medical record.    I, Raimundo Lobato, attest that above named individual is acting in scribe capacity, has observed my performance of the services and has documented them in accordance with my direction. The documentation recorded by the scribe accurately reflects the service I personally performed and the decisions made by me.    Again, thank you for allowing me to participate in the care of your patient.      Sincerely,    Raimundo Lobato MD

## 2017-11-14 NOTE — PROGRESS NOTES
Dear Dr. Garcia:    We saw Mr. Angle Munguia in [facility] today with his daughter, wife and a ong . He is a right-handed 82 year old man who was seen in the ED after two weeks of headache and lower extremity weakness. He underwent a CT of the head that revealed bilateral chronic subdural hematomas with etiology determined to be antiplatelet therapy while on dialysis. Since being discharged, he no longer has headaches though his daughter states his mobility and cognition are declining. He no longer walks or stands independently. He frequently repeats questions and is disorientated. He also had episodes of incontinence following his recent discharge. These are new developments beginning this summer, but his daughter appreciates gradual decline beginning 2-3 years ago. He stopped Plavix since the discovery of his hematomas. He denies fevers or dysuria.     On exam, he is somnolent, but easily aroused. He is oriented to name and place only. He appears to be speaking fluently and coherently. He has good strength in the upper extremities. He has antigravity strength in the lower extremities. He needs assistance to get out of the chair. While he was able to bear weight, he was unable to take any steps.    REVIEW OF STUDIES: We went over his recent CT scan. His left frontal and parietal convexity subdural hematomas have resolved. He has diffuse atrophy with appropriately size ventricles.    IMPRESSION AND PLAN: While there appear to be no active neurological issues, Mr. Munguia has clearly declined cognitively over the past few months. From our standpoint, he can go back on antiplatelet or anti-coagulation therapy. His altered mental status is concerning and a thorough work-up is indicated. Perhaps, this is related to his chronic kidney disease. We will start with basic labs and UA today and we will forward these results to you for follow-up. We will see him back in our clinic as needed.  Please do  not hesitate to contact us with questions. We will keep you informed of his progress.    RAIMUNDO LOBATO MD    I, Jc Lockett, am serving as a scribe to document services personally performed by Raimundo Lobato MD, based upon my observations and the provider's statements to me. All documentation has been reviewed by the aforementioned doctor prior to being entered into the official medical record.    I, Raimundo Lobato, attest that above named individual is acting in scribe capacity, has observed my performance of the services and has documented them in accordance with my direction. The documentation recorded by the scribe accurately reflects the service I personally performed and the decisions made by me.

## 2017-11-15 ENCOUNTER — APPOINTMENT (OUTPATIENT)
Dept: INTERVENTIONAL RADIOLOGY/VASCULAR | Facility: CLINIC | Age: 82
End: 2017-11-15
Attending: PHYSICIAN ASSISTANT
Payer: MEDICARE

## 2017-11-15 ENCOUNTER — HOSPITAL ENCOUNTER (OUTPATIENT)
Facility: CLINIC | Age: 82
Discharge: HOME | End: 2017-11-15
Attending: RADIOLOGY | Admitting: RADIOLOGY
Payer: MEDICARE

## 2017-11-15 VITALS
DIASTOLIC BLOOD PRESSURE: 86 MMHG | OXYGEN SATURATION: 93 % | RESPIRATION RATE: 16 BRPM | HEART RATE: 77 BPM | SYSTOLIC BLOOD PRESSURE: 164 MMHG | TEMPERATURE: 95 F

## 2017-11-15 DIAGNOSIS — Z51.89 TREATMENT: ICD-10-CM

## 2017-11-15 PROCEDURE — C1725 CATH, TRANSLUMIN NON-LASER: HCPCS

## 2017-11-15 PROCEDURE — 25000128 H RX IP 250 OP 636

## 2017-11-15 PROCEDURE — C1769 GUIDE WIRE: HCPCS

## 2017-11-15 PROCEDURE — 27210845 ZZH DEVICE INFLATION CR5

## 2017-11-15 PROCEDURE — 27210742 ZZH CATH CR1

## 2017-11-15 PROCEDURE — 25000128 H RX IP 250 OP 636: Performed by: RADIOLOGY

## 2017-11-15 PROCEDURE — C1876 STENT, NON-COA/NON-COV W/DEL: HCPCS

## 2017-11-15 PROCEDURE — 27210804 ZZH SHEATH CR3

## 2017-11-15 PROCEDURE — 27210886 ZZH ACCESSORY CR5

## 2017-11-15 PROCEDURE — 40000854 ZZH STATISTIC SIMPLE TUBE INSERTION/CHARGE, PORT, CATH, FISTULOGRAM

## 2017-11-15 PROCEDURE — 27210906 ZZH KIT CR8

## 2017-11-15 PROCEDURE — 36908 STENT PLMT CTR DIALYSIS SEG: CPT

## 2017-11-15 RX ORDER — IOPAMIDOL 612 MG/ML
50 INJECTION, SOLUTION INTRAVASCULAR ONCE
Status: COMPLETED | OUTPATIENT
Start: 2017-11-15 | End: 2017-11-15

## 2017-11-15 RX ORDER — FENTANYL CITRATE 50 UG/ML
INJECTION, SOLUTION INTRAMUSCULAR; INTRAVENOUS
Status: COMPLETED
Start: 2017-11-15 | End: 2017-11-15

## 2017-11-15 RX ORDER — LIDOCAINE HYDROCHLORIDE 10 MG/ML
INJECTION, SOLUTION INFILTRATION; PERINEURAL
Status: DISCONTINUED
Start: 2017-11-15 | End: 2017-11-15 | Stop reason: HOSPADM

## 2017-11-15 RX ORDER — HEPARIN SODIUM 1000 [USP'U]/ML
INJECTION, SOLUTION INTRAVENOUS; SUBCUTANEOUS
Status: DISCONTINUED
Start: 2017-11-15 | End: 2017-11-15 | Stop reason: HOSPADM

## 2017-11-15 RX ORDER — FLUMAZENIL 0.1 MG/ML
0.2 INJECTION, SOLUTION INTRAVENOUS
Status: DISCONTINUED | OUTPATIENT
Start: 2017-11-15 | End: 2017-11-15 | Stop reason: HOSPADM

## 2017-11-15 RX ORDER — LIDOCAINE HYDROCHLORIDE 10 MG/ML
1-30 INJECTION, SOLUTION EPIDURAL; INFILTRATION; INTRACAUDAL; PERINEURAL
Status: DISCONTINUED | OUTPATIENT
Start: 2017-11-15 | End: 2017-11-15 | Stop reason: HOSPADM

## 2017-11-15 RX ORDER — FENTANYL CITRATE 50 UG/ML
25-50 INJECTION, SOLUTION INTRAMUSCULAR; INTRAVENOUS EVERY 5 MIN PRN
Status: DISCONTINUED | OUTPATIENT
Start: 2017-11-15 | End: 2017-11-15 | Stop reason: HOSPADM

## 2017-11-15 RX ORDER — NALOXONE HYDROCHLORIDE 0.4 MG/ML
.1-.4 INJECTION, SOLUTION INTRAMUSCULAR; INTRAVENOUS; SUBCUTANEOUS
Status: DISCONTINUED | OUTPATIENT
Start: 2017-11-15 | End: 2017-11-15 | Stop reason: HOSPADM

## 2017-11-15 RX ORDER — LIDOCAINE 40 MG/G
CREAM TOPICAL
Status: DISCONTINUED | OUTPATIENT
Start: 2017-11-15 | End: 2017-11-15 | Stop reason: HOSPADM

## 2017-11-15 RX ADMIN — FENTANYL CITRATE 50 MCG: 50 INJECTION INTRAMUSCULAR; INTRAVENOUS at 08:36

## 2017-11-15 RX ADMIN — MIDAZOLAM HYDROCHLORIDE 1 MG: 1 INJECTION, SOLUTION INTRAMUSCULAR; INTRAVENOUS at 08:35

## 2017-11-15 RX ADMIN — IOPAMIDOL 110 ML: 612 INJECTION, SOLUTION INTRAVENOUS at 10:00

## 2017-11-15 RX ADMIN — FENTANYL CITRATE 25 MCG: 50 INJECTION INTRAMUSCULAR; INTRAVENOUS at 09:33

## 2017-11-15 NOTE — PROGRESS NOTES
Pt back from procedure,VSS, denies pain, left arm fistula site c/d/i with dressing stitch in place ( to be removed by radiology later today). Stent card given to wife and discharge instructions reviewed with  present.

## 2017-11-15 NOTE — PROCEDURES
RADIOLOGY PROCEDURE NOTE  Patient name: Angle Munguia  MRN: [id number]  : 1935    Pre-procedure diagnosis: arm swelling.   Post-procedure diagnosis: Same    Procedure Date/Time: November 15, 2017  10:00 AM  Procedure: fistulogram with angioplasty of the subclavian vein, superior vena cava and peripheral venous outflow. Placement of a 140 x 40 mm self expanding stent.   Estimated blood loss: None  Specimen(s) collected with description: none  The patient tolerated the procedure well with no immediate complications.  Significant findings:none    See imaging dictation for procedural details.    Provider name: Josselin Hoff  Assistant(s):None

## 2017-11-15 NOTE — IP AVS SNAPSHOT
[place] [address]    [place] [address]    Phone:  [phone]                                       After Visit Summary   11/15/2017    Angle Munguia    MRN: [id number]           After Visit Summary Signature Page     I have received my discharge instructions, and my questions have been answered. I have discussed any challenges I see with this plan with the nurse or doctor.    ..........................................................................................................................................  Patient/Patient Representative Signature      ..........................................................................................................................................  Patient Representative Print Name and Relationship to Patient    ..................................................               ................................................  Date                                            Time    ..........................................................................................................................................  Reviewed by Signature/Title    ...................................................              ..............................................  Date                                                            Time

## 2017-11-15 NOTE — DISCHARGE INSTRUCTIONS
Fistulagram Discharge Instructions     After you go home:      You may resume your normal diet    Have an adult stay with you for 6 hours if you received sedation       For 24 hours - due to the sedation you received:    Relax and take it easy    Do NOT make any important or legal decisions    Do NOT drive or operate machines at home or at work    Do NOT drink alcohol    Care of Puncture Site:      For the first 48 hrs, check your puncture site every couple hours while you are awake     You may remove/change the bandage tomorrow    You may shower tomorrow    No tub baths, whirlpools or swimming until your puncture site has fully healed    If puncture site starts to bleed - apply light pressure to site for 5 minutes or until bleeding stops     Activity       You should try to elevate your arm for the remainder of today to prevent increased swelling    You may go back to your normal activity in 24 hours     Wait 48 hours before lifting, straining, exercise or other strenuous activity    Medicines:      You may resume all your medications    For minor pain, you may take Acetaminophen (Tylenol) or Ibuprofen (Advil)                 Call the provider who ordered this procedure if:      Increased pain or a large or growing hard lump around the site    Blood or fluid is draining from the site    The site is red, swollen, hot or tender    Chills or a fever greater than 101 F (38 C)    Pain that is getting worse    Any questions or concerns      Call  911 or go to the Emergency Room if:    Severe pain or trouble breathing    Bleeding that you cannot control      If you have questions call:          [facility] @ [phone]        The provider who performed your procedure was _____Dr. Hoff____________.

## 2017-11-15 NOTE — IP AVS SNAPSHOT
MRN:[id number]                      After Visit Summary   11/15/2017    Angle Munguia    MRN: [id number]           Visit Information        Department      11/15/2017  6:28 AM [facility]          Review of your medicines      UNREVIEWED medicines. Ask your doctor about these medicines        Dose / Directions    ALLOPURINOL PO        Dose:  100 mg   Take 100 mg by mouth daily   Refills:  0       AMLODIPINE BESYLATE PO        Dose:  5 mg   Take 5 mg by mouth 2 times daily   Refills:  0       calcium acetate 667 MG Caps capsule   Commonly known as:  PHOSLO        Dose:  667 mg   Take 667 mg by mouth 3 times daily (with meals)   Refills:  0       calcium carbonate 1250 MG tablet   Commonly known as:  OS-SHIRA 500 mg [place]. Ca        Dose:  500 mg   Take 500 mg by mouth 2 times daily   Refills:  0       cefdinir 300 MG capsule   Commonly known as:  OMNICEF   Indication:  HCAP   Used for:  Community acquired pneumonia        Dose:  300 mg   Take 1 capsule (300 mg) by mouth every 48 hours   Quantity:  5 capsule   Refills:  0       DIALYVITE Tabs        Take  by mouth.   Refills:  0       ISOSORBIDE MONONITRATE PO        Dose:  15 mg   Take 15 mg by mouth daily   Refills:  0       lactulose 10 GM/15ML solution   Commonly known as:  CHRONULAC        Dose:  10 g   Take 10 g by mouth as needed for constipation   Refills:  0       levETIRAcetam 500 MG tablet   Commonly known as:  KEPPRA        Dose:  500 mg   Take 1 tablet (500 mg) by mouth daily   Quantity:  30 tablet   Refills:  0       LISINOPRIL PO        Dose:  20 mg   Take 20 mg by mouth daily   Refills:  0       metoprolol 25 MG 24 hr tablet   Commonly known as:  TOPROL-XL   Used for:  Atrial fibrillation (H)        Dose:  25 mg   Take 1 tablet (25 mg) by mouth daily   Quantity:  30 tablet   Refills:  0       nitroGLYcerin 0.4 MG sublingual tablet   Commonly known as:  NITROSTAT        Dose:  0.4 mg   Place 0.4 mg under the tongue  every 5 minutes as needed for chest pain For chest pain place 1 tablet under the tongue every 5 minutes for 3 doses. If symptoms persist 5 minutes after 1st dose call 911.   Refills:  0       OMEPRAZOLE PO        Dose:  20 mg   Take 20 mg by mouth every morning   Refills:  0       TYLENOL PO        Dose:  1000 mg   Take 1,000 mg by mouth every 8 hours as needed   Refills:  0       Urea 40 % Crea   Used for:  Xerosis cutis        Externally apply  topically 2 times daily. As directed.   Quantity:  180 g   Refills:  3       VITAMIN D3 PO        Dose:  400 Units   Take 400 Units by mouth daily   Refills:  0                Protect others around you: Learn how to safely use, store and throw away your medicines at www.disposemymeds.org.         Follow-ups after your visit        Your next 10 appointments already scheduled     Nov 15, 2017  8:30 AM CST   IR DIALYSIS FISTULOGRAM LEFT with SHIR1   [facility] Interventional Radiology ([facility])    [address] [place] [id number] [phone]           1. Do not eat any solid food or milk products for 6 hours prior to the exam. You may drink clear liquids until 2 hours prior to the exam. Clear liquids include the following: water, Jell-O, clear broth, apple juice, or any non-carbonated drink that you can see through (no pop/soda!). 2. If you have diabetes, check with your doctors to see if your insulin needs to be adjusted for the exam. 3. If you are taking an oral hypoglycemia agent used to control your glucose, this medication needs to be held on the morning of your exam, but may be taken after the exam when you resume eating. 4. The morning of the exam you may brush your teeth and take your other medication as directed with a sip of water. 5. It is important to tell the Radiology nurse if you have any allergies, especially to IV contrast material. 6. It is important to tell the Radiology nurse if you think you might be pregnant. 7.  Make arrangements for someone to drive you home. A responsible adult must stay with you during the next 24 hours. 8. Bring a list of all drugs you are taking; include supplements and over-the-counter medications. 9. Wear comfortable clothes and leave valuables at home.               Care Instructions        Further instructions from your care team       Fistulagram Discharge Instructions     After you go home:      You may resume your normal diet    Have an adult stay with you for 6 hours if you received sedation       For 24 hours - due to the sedation you received:    Relax and take it easy    Do NOT make any important or legal decisions    Do NOT drive or operate machines at home or at work    Do NOT drink alcohol    Care of Puncture Site:      For the first 48 hrs, check your puncture site every couple hours while you are awake     You may remove/change the bandage tomorrow    You may shower tomorrow    No tub baths, whirlpools or swimming until your puncture site has fully healed    If puncture site starts to bleed - apply light pressure to site for 5 minutes or until bleeding stops     Activity       You should try to elevate your arm for the remainder of today to prevent increased swelling    You may go back to your normal activity in 24 hours     Wait 48 hours before lifting, straining, exercise or other strenuous activity    Medicines:      You may resume all your medications    For minor pain, you may take Acetaminophen (Tylenol) or Ibuprofen (Advil)                 Call the provider who ordered this procedure if:      Increased pain or a large or growing hard lump around the site    Blood or fluid is draining from the site    The site is red, swollen, hot or tender    Chills or a fever greater than 101 F (38 C)    Pain that is getting worse    Any questions or concerns      Call  911 or go to the Emergency Room if:    Severe pain or trouble breathing    Bleeding that you cannot control      If you have  "questions call:          [facility] Radiology Dept @ [phone]        The provider who performed your procedure was _____Dr. Hoff____________.           Additional Information About Your Visit        MyChart Information     [employer] lets you send messages to your doctor, view your test results, renew your prescriptions, schedule appointments and more. To sign up, go to [email or web address] . Click on \"Log in\" on the left side of the screen, which will take you to the Welcome page. Then click on \"Sign up Now\" on the right side of the page.     You will be asked to enter the access code listed below, as well as some personal information. Please follow the directions to create your username and password.     Your access code is: [email or web address]  Expires: 12/10/2017  5:30 AM     Your access code will  in 90 days. If you need help or a new code, please call your [facility] clinic or [phone].        Care EveryWhere ID     This is your Care EveryWhere ID. This could be used by other organizations to access your [facility] medical records  [id number]        Your Vitals Were     Blood Pressure Pulse Temperature Respirations Pulse Oximetry       181/89 80 95  F (35  C) 16 96%        Primary Care Provider Office Phone # Fax #    La Nena Garcia [phone]      Equal Access to Services     [facility]: Hadii kady ku hadasho Sojaneeali, waaxda luqadaha, qaybta kaalmada adeabimaelyada, dayo mensah . So [facility] [phone].    ATENCIÓN: Si habla español, tiene a nolan disposición servicios gratuitos de asistencia lingüística. Denny porter [phone].    We comply with applicable federal civil rights laws and [place] laws. We do not discriminate on the basis of race, color, national origin, age, disability, sex, sexual orientation, or gender identity.            Thank you!     Thank you for choosing [facility] for your care. Our goal is always to provide you with excellent " care. Hearing back from our patients is one way we can continue to improve our services. Please take a few minutes to complete the written survey that you may receive in the mail after you visit with us. Thank you!             Medication List: This is a list of all your medications and when to take them. Check marks below indicate your daily home schedule. Keep this list as a reference.      Medications           Morning Afternoon Evening Bedtime As Needed    ALLOPURINOL PO   Take 100 mg by mouth daily                                AMLODIPINE BESYLATE PO   Take 5 mg by mouth 2 times daily                                calcium acetate 667 MG Caps capsule   Commonly known as:  PHOSLO   Take 667 mg by mouth 3 times daily (with meals)                                calcium carbonate 1250 MG tablet   Commonly known as:  OS-SHIRA 500 mg [place]. Ca   Take 500 mg by mouth 2 times daily                                cefdinir 300 MG capsule   Commonly known as:  OMNICEF   Take 1 capsule (300 mg) by mouth every 48 hours                                DIALYVITE Tabs   Take  by mouth.                                ISOSORBIDE MONONITRATE PO   Take 15 mg by mouth daily                                lactulose 10 GM/15ML solution   Commonly known as:  CHRONULAC   Take 10 g by mouth as needed for constipation                                levETIRAcetam 500 MG tablet   Commonly known as:  KEPPRA   Take 1 tablet (500 mg) by mouth daily                                LISINOPRIL PO   Take 20 mg by mouth daily                                metoprolol 25 MG 24 hr tablet   Commonly known as:  TOPROL-XL   Take 1 tablet (25 mg) by mouth daily                                nitroGLYcerin 0.4 MG sublingual tablet   Commonly known as:  NITROSTAT   Place 0.4 mg under the tongue every 5 minutes as needed for chest pain For chest pain place 1 tablet under the tongue every 5 minutes for 3 doses. If symptoms persist 5 minutes after 1st dose call  911.                                OMEPRAZOLE PO   Take 20 mg by mouth every morning                                TYLENOL PO   Take 1,000 mg by mouth every 8 hours as needed                                Urea 40 % Crea   Externally apply  topically 2 times daily. As directed.                                VITAMIN D3 PO   Take 400 Units by mouth daily

## 2018-01-01 ENCOUNTER — APPOINTMENT (OUTPATIENT)
Dept: OCCUPATIONAL THERAPY | Facility: CLINIC | Age: 83
DRG: 252 | End: 2018-01-01
Attending: HOSPITALIST
Payer: MEDICARE

## 2018-01-01 ENCOUNTER — HOSPITAL ENCOUNTER (OUTPATIENT)
Dept: INTERVENTIONAL RADIOLOGY/VASCULAR | Facility: CLINIC | Age: 83
End: 2018-09-20
Attending: INTERNAL MEDICINE | Admitting: RADIOLOGY
Payer: MEDICARE

## 2018-01-01 ENCOUNTER — APPOINTMENT (OUTPATIENT)
Dept: GENERAL RADIOLOGY | Facility: CLINIC | Age: 83
End: 2018-01-01
Attending: SURGERY
Payer: MEDICARE

## 2018-01-01 ENCOUNTER — APPOINTMENT (OUTPATIENT)
Dept: ULTRASOUND IMAGING | Facility: CLINIC | Age: 83
End: 2018-01-01
Attending: RADIOLOGY
Payer: MEDICARE

## 2018-01-01 ENCOUNTER — HOSPITAL ENCOUNTER (OUTPATIENT)
Facility: CLINIC | Age: 83
Discharge: HOME | End: 2018-11-21
Attending: SURGERY | Admitting: SURGERY
Payer: MEDICARE

## 2018-01-01 ENCOUNTER — ANESTHESIA EVENT (OUTPATIENT)
Dept: SURGERY | Facility: CLINIC | Age: 83
DRG: 252 | End: 2018-01-01
Payer: MEDICARE

## 2018-01-01 ENCOUNTER — HOSPITAL ENCOUNTER (OUTPATIENT)
Facility: CLINIC | Age: 83
Discharge: HOME | End: 2018-05-31
Attending: RADIOLOGY | Admitting: RADIOLOGY
Payer: MEDICARE

## 2018-01-01 ENCOUNTER — ANESTHESIA (OUTPATIENT)
Dept: SURGERY | Facility: CLINIC | Age: 83
End: 2018-01-01
Payer: MEDICARE

## 2018-01-01 ENCOUNTER — TELEPHONE (OUTPATIENT)
Dept: OTHER | Facility: CLINIC | Age: 83
End: 2018-01-01

## 2018-01-01 ENCOUNTER — SURGERY (OUTPATIENT)
Age: 83
End: 2018-01-01

## 2018-01-01 ENCOUNTER — TRANSFERRED RECORDS (OUTPATIENT)
Dept: HEALTH INFORMATION MANAGEMENT | Facility: CLINIC | Age: 83
End: 2018-01-01

## 2018-01-01 ENCOUNTER — APPOINTMENT (OUTPATIENT)
Dept: ULTRASOUND IMAGING | Facility: CLINIC | Age: 83
DRG: 252 | End: 2018-01-01
Attending: HOSPITALIST
Payer: MEDICARE

## 2018-01-01 ENCOUNTER — OFFICE VISIT (OUTPATIENT)
Dept: SURGERY | Facility: PHYSICIAN GROUP | Age: 83
End: 2018-01-01
Payer: MEDICARE

## 2018-01-01 ENCOUNTER — ANESTHESIA EVENT (OUTPATIENT)
Dept: SURGERY | Facility: CLINIC | Age: 83
End: 2018-01-01
Payer: MEDICARE

## 2018-01-01 ENCOUNTER — ANESTHESIA (OUTPATIENT)
Dept: SURGERY | Facility: CLINIC | Age: 83
DRG: 252 | End: 2018-01-01
Payer: MEDICARE

## 2018-01-01 ENCOUNTER — HOSPITAL ENCOUNTER (OUTPATIENT)
Facility: CLINIC | Age: 83
Discharge: HOME | End: 2018-07-19
Attending: RADIOLOGY | Admitting: RADIOLOGY
Payer: MEDICARE

## 2018-01-01 ENCOUNTER — APPOINTMENT (OUTPATIENT)
Dept: INTERVENTIONAL RADIOLOGY/VASCULAR | Facility: CLINIC | Age: 83
End: 2018-01-01
Attending: INTERNAL MEDICINE
Payer: MEDICARE

## 2018-01-01 ENCOUNTER — HOSPITAL ENCOUNTER (OUTPATIENT)
Dept: INTERVENTIONAL RADIOLOGY/VASCULAR | Facility: CLINIC | Age: 83
End: 2018-07-19
Attending: INTERNAL MEDICINE | Admitting: RADIOLOGY
Payer: MEDICARE

## 2018-01-01 ENCOUNTER — APPOINTMENT (OUTPATIENT)
Dept: INTERVENTIONAL RADIOLOGY/VASCULAR | Facility: CLINIC | Age: 83
End: 2018-01-01
Attending: SURGERY
Payer: MEDICARE

## 2018-01-01 ENCOUNTER — HOSPITAL ENCOUNTER (INPATIENT)
Facility: CLINIC | Age: 83
LOS: 7 days | Discharge: HOME | DRG: 252 | End: 2019-01-02
Attending: HOSPITALIST | Admitting: HOSPITALIST
Payer: MEDICARE

## 2018-01-01 ENCOUNTER — APPOINTMENT (OUTPATIENT)
Dept: INTERVENTIONAL RADIOLOGY/VASCULAR | Facility: CLINIC | Age: 83
DRG: 252 | End: 2018-01-01
Attending: SURGERY
Payer: MEDICARE

## 2018-01-01 ENCOUNTER — OFFICE VISIT (OUTPATIENT)
Dept: OTHER | Facility: CLINIC | Age: 83
End: 2018-01-01
Attending: SURGERY
Payer: MEDICARE

## 2018-01-01 ENCOUNTER — HOSPITAL ENCOUNTER (OUTPATIENT)
Facility: CLINIC | Age: 83
Discharge: HOME | End: 2018-09-20
Attending: RADIOLOGY | Admitting: RADIOLOGY
Payer: MEDICARE

## 2018-01-01 ENCOUNTER — HOSPITAL ENCOUNTER (OUTPATIENT)
Facility: CLINIC | Age: 83
Discharge: HOME | End: 2018-02-13
Attending: RADIOLOGY | Admitting: RADIOLOGY
Payer: MEDICARE

## 2018-01-01 VITALS
HEART RATE: 84 BPM | SYSTOLIC BLOOD PRESSURE: 126 MMHG | BODY MASS INDEX: 23 KG/M2 | HEIGHT: 62 IN | RESPIRATION RATE: 16 BRPM | WEIGHT: 125 LBS | DIASTOLIC BLOOD PRESSURE: 55 MMHG | OXYGEN SATURATION: 98 % | TEMPERATURE: 97.8 F

## 2018-01-01 VITALS
TEMPERATURE: 95.1 F | HEART RATE: 60 BPM | RESPIRATION RATE: 16 BRPM | OXYGEN SATURATION: 98 % | SYSTOLIC BLOOD PRESSURE: 151 MMHG | DIASTOLIC BLOOD PRESSURE: 64 MMHG

## 2018-01-01 VITALS
OXYGEN SATURATION: 98 % | HEART RATE: 79 BPM | RESPIRATION RATE: 16 BRPM | SYSTOLIC BLOOD PRESSURE: 114 MMHG | DIASTOLIC BLOOD PRESSURE: 48 MMHG

## 2018-01-01 VITALS
DIASTOLIC BLOOD PRESSURE: 52 MMHG | OXYGEN SATURATION: 98 % | SYSTOLIC BLOOD PRESSURE: 112 MMHG | RESPIRATION RATE: 9 BRPM

## 2018-01-01 VITALS
WEIGHT: 120 LBS | OXYGEN SATURATION: 100 % | HEART RATE: 85 BPM | BODY MASS INDEX: 21.95 KG/M2 | TEMPERATURE: 95.5 F | RESPIRATION RATE: 16 BRPM | SYSTOLIC BLOOD PRESSURE: 156 MMHG | DIASTOLIC BLOOD PRESSURE: 62 MMHG

## 2018-01-01 VITALS
SYSTOLIC BLOOD PRESSURE: 121 MMHG | OXYGEN SATURATION: 100 % | DIASTOLIC BLOOD PRESSURE: 77 MMHG | RESPIRATION RATE: 14 BRPM

## 2018-01-01 VITALS
DIASTOLIC BLOOD PRESSURE: 60 MMHG | OXYGEN SATURATION: 98 % | SYSTOLIC BLOOD PRESSURE: 116 MMHG | TEMPERATURE: 98.8 F | BODY MASS INDEX: 25.33 KG/M2 | RESPIRATION RATE: 18 BRPM | WEIGHT: 138.5 LBS

## 2018-01-01 DIAGNOSIS — Z01.818 ENCOUNTER FOR OTHER PREPROCEDURAL EXAMINATION: ICD-10-CM

## 2018-01-01 DIAGNOSIS — N18.6 END STAGE RENAL DISEASE (H): ICD-10-CM

## 2018-01-01 DIAGNOSIS — N18.6 ESRD (END STAGE RENAL DISEASE) ON DIALYSIS (H): Primary | ICD-10-CM

## 2018-01-01 DIAGNOSIS — N18.6 END STAGE CHRONIC KIDNEY DISEASE (H): ICD-10-CM

## 2018-01-01 DIAGNOSIS — Z99.2 ESRD (END STAGE RENAL DISEASE) ON DIALYSIS (H): Primary | ICD-10-CM

## 2018-01-01 DIAGNOSIS — S40.022A HEMATOMA OF ARM, LEFT, INITIAL ENCOUNTER: ICD-10-CM

## 2018-01-01 DIAGNOSIS — Z51.89 TREATMENT: ICD-10-CM

## 2018-01-01 DIAGNOSIS — R60.9 SWELLING: ICD-10-CM

## 2018-01-01 DIAGNOSIS — T82.9XXD DIALYSIS COMPLICATION, SUBSEQUENT ENCOUNTER: Primary | ICD-10-CM

## 2018-01-01 DIAGNOSIS — I48.91 ATRIAL FIBRILLATION (H): ICD-10-CM

## 2018-01-01 LAB
ABO + RH BLD: NORMAL
ALBUMIN SERPL-MCNC: 2.5 G/DL (ref 3.4–5)
ALBUMIN SERPL-MCNC: 3.6 G/DL (ref 3.4–5)
ALP SERPL-CCNC: 51 U/L (ref 40–150)
ALT SERPL W P-5'-P-CCNC: 10 U/L (ref 0–70)
ANION GAP SERPL CALCULATED.3IONS-SCNC: 10 MMOL/L (ref 3–14)
ANION GAP SERPL CALCULATED.3IONS-SCNC: 12 MMOL/L (ref 3–14)
ANION GAP SERPL CALCULATED.3IONS-SCNC: 14 MMOL/L (ref 3–14)
ANION GAP SERPL CALCULATED.3IONS-SCNC: 16 MMOL/L (ref 3–14)
APTT PPP: 34 SEC (ref 22–37)
APTT PPP: 36 SEC (ref 22–37)
APTT PPP: 43 SEC (ref 22–37)
APTT PPP: 43 SEC (ref 22–37)
AST SERPL W P-5'-P-CCNC: 8 U/L (ref 0–45)
BASOPHILS # BLD AUTO: 0 10E9/L (ref 0–0.2)
BASOPHILS NFR BLD AUTO: 0.3 %
BILIRUB SERPL-MCNC: 0.7 MG/DL (ref 0.2–1.3)
BLD GP AB SCN SERPL QL: NORMAL
BLD GP AB SCN SERPL QL: NORMAL
BLD PROD TYP BPU: NORMAL
BLD UNIT ID BPU: 0
BLOOD BANK CMNT PATIENT-IMP: NORMAL
BLOOD BANK CMNT PATIENT-IMP: NORMAL
BLOOD PRODUCT CODE: 0
BLOOD PRODUCT CODE: NORMAL
BPU ID: NORMAL
BUN SERPL-MCNC: 27 MG/DL (ref 7–30)
BUN SERPL-MCNC: 46 MG/DL (ref 7–30)
BUN SERPL-MCNC: 53 MG/DL (ref 7–30)
BUN SERPL-MCNC: 56 MG/DL (ref 7–30)
BUN SERPL-MCNC: 79 MG/DL (ref 7–30)
BUN SERPL-MCNC: 84 MG/DL (ref 7–30)
BUN SERPL-MCNC: 91 MG/DL (ref 7–30)
CALCIUM SERPL-MCNC: 7.2 MG/DL (ref 8.5–10.1)
CALCIUM SERPL-MCNC: 7.9 MG/DL (ref 8.5–10.1)
CALCIUM SERPL-MCNC: 8.2 MG/DL (ref 8.5–10.1)
CALCIUM SERPL-MCNC: 8.3 MG/DL (ref 8.5–10.1)
CALCIUM SERPL-MCNC: 8.5 MG/DL (ref 8.5–10.1)
CALCIUM SERPL-MCNC: 8.7 MG/DL (ref 8.5–10.1)
CALCIUM SERPL-MCNC: 8.7 MG/DL (ref 8.5–10.1)
CHLORIDE SERPL-SCNC: 94 MMOL/L (ref 94–109)
CHLORIDE SERPL-SCNC: 96 MMOL/L (ref 94–109)
CHLORIDE SERPL-SCNC: 96 MMOL/L (ref 94–109)
CHLORIDE SERPL-SCNC: 97 MMOL/L (ref 94–109)
CHLORIDE SERPL-SCNC: 97 MMOL/L (ref 94–109)
CHLORIDE SERPL-SCNC: 98 MMOL/L (ref 94–109)
CHLORIDE SERPL-SCNC: 98 MMOL/L (ref 94–109)
CO2 SERPL-SCNC: 19 MMOL/L (ref 20–32)
CO2 SERPL-SCNC: 22 MMOL/L (ref 20–32)
CO2 SERPL-SCNC: 23 MMOL/L (ref 20–32)
CO2 SERPL-SCNC: 25 MMOL/L (ref 20–32)
CO2 SERPL-SCNC: 25 MMOL/L (ref 20–32)
CO2 SERPL-SCNC: 27 MMOL/L (ref 20–32)
CO2 SERPL-SCNC: 30 MMOL/L (ref 20–32)
COPATH REPORT: NORMAL
CREAT SERPL-MCNC: 11.5 MG/DL (ref 0.66–1.25)
CREAT SERPL-MCNC: 12.4 MG/DL (ref 0.66–1.25)
CREAT SERPL-MCNC: 13.8 MG/DL (ref 0.66–1.25)
CREAT SERPL-MCNC: 5.85 MG/DL (ref 0.66–1.25)
CREAT SERPL-MCNC: 7.86 MG/DL (ref 0.66–1.25)
CREAT SERPL-MCNC: 8.47 MG/DL (ref 0.66–1.25)
CREAT SERPL-MCNC: 8.7 MG/DL (ref 0.66–1.25)
DIFFERENTIAL METHOD BLD: ABNORMAL
EOSINOPHIL # BLD AUTO: 0.2 10E9/L (ref 0–0.7)
EOSINOPHIL NFR BLD AUTO: 3.7 %
ERYTHROCYTE [DISTWIDTH] IN BLOOD BY AUTOMATED COUNT: 15 % (ref 10–15)
ERYTHROCYTE [DISTWIDTH] IN BLOOD BY AUTOMATED COUNT: 15.1 % (ref 10–15)
ERYTHROCYTE [DISTWIDTH] IN BLOOD BY AUTOMATED COUNT: 15.2 % (ref 10–15)
ERYTHROCYTE [DISTWIDTH] IN BLOOD BY AUTOMATED COUNT: 15.2 % (ref 10–15)
ERYTHROCYTE [DISTWIDTH] IN BLOOD BY AUTOMATED COUNT: 15.3 % (ref 10–15)
ERYTHROCYTE [DISTWIDTH] IN BLOOD BY AUTOMATED COUNT: 15.7 % (ref 10–15)
ERYTHROCYTE [DISTWIDTH] IN BLOOD BY AUTOMATED COUNT: 16 % (ref 10–15)
FERRITIN SERPL-MCNC: 607 NG/ML (ref 26–388)
GFR SERPL CREATININE-BSD FRML MDRD: 3 ML/MIN/{1.73_M2}
GFR SERPL CREATININE-BSD FRML MDRD: 3 ML/MIN/{1.73_M2}
GFR SERPL CREATININE-BSD FRML MDRD: 4 ML/MIN/{1.73_M2}
GFR SERPL CREATININE-BSD FRML MDRD: 5 ML/MIN/{1.73_M2}
GFR SERPL CREATININE-BSD FRML MDRD: 5 ML/MIN/{1.73_M2}
GFR SERPL CREATININE-BSD FRML MDRD: 6 ML/MIN/{1.73_M2}
GFR SERPL CREATININE-BSD FRML MDRD: 9 ML/MIN/1.7M2
GLUCOSE BLDC GLUCOMTR-MCNC: 100 MG/DL (ref 70–99)
GLUCOSE BLDC GLUCOMTR-MCNC: 106 MG/DL (ref 70–99)
GLUCOSE BLDC GLUCOMTR-MCNC: 117 MG/DL (ref 70–99)
GLUCOSE BLDC GLUCOMTR-MCNC: 123 MG/DL (ref 70–99)
GLUCOSE BLDC GLUCOMTR-MCNC: 127 MG/DL (ref 70–99)
GLUCOSE BLDC GLUCOMTR-MCNC: 127 MG/DL (ref 70–99)
GLUCOSE BLDC GLUCOMTR-MCNC: 131 MG/DL (ref 70–99)
GLUCOSE BLDC GLUCOMTR-MCNC: 131 MG/DL (ref 70–99)
GLUCOSE BLDC GLUCOMTR-MCNC: 133 MG/DL (ref 70–99)
GLUCOSE BLDC GLUCOMTR-MCNC: 135 MG/DL (ref 70–99)
GLUCOSE BLDC GLUCOMTR-MCNC: 136 MG/DL (ref 70–99)
GLUCOSE BLDC GLUCOMTR-MCNC: 137 MG/DL (ref 70–99)
GLUCOSE BLDC GLUCOMTR-MCNC: 139 MG/DL (ref 70–99)
GLUCOSE BLDC GLUCOMTR-MCNC: 143 MG/DL (ref 70–99)
GLUCOSE BLDC GLUCOMTR-MCNC: 144 MG/DL (ref 70–99)
GLUCOSE BLDC GLUCOMTR-MCNC: 147 MG/DL (ref 70–99)
GLUCOSE BLDC GLUCOMTR-MCNC: 151 MG/DL (ref 70–99)
GLUCOSE BLDC GLUCOMTR-MCNC: 152 MG/DL (ref 70–99)
GLUCOSE BLDC GLUCOMTR-MCNC: 168 MG/DL (ref 70–99)
GLUCOSE BLDC GLUCOMTR-MCNC: 169 MG/DL (ref 70–99)
GLUCOSE BLDC GLUCOMTR-MCNC: 176 MG/DL (ref 70–99)
GLUCOSE BLDC GLUCOMTR-MCNC: 178 MG/DL (ref 70–99)
GLUCOSE BLDC GLUCOMTR-MCNC: 180 MG/DL (ref 70–99)
GLUCOSE BLDC GLUCOMTR-MCNC: 195 MG/DL (ref 70–99)
GLUCOSE BLDC GLUCOMTR-MCNC: 195 MG/DL (ref 70–99)
GLUCOSE SERPL-MCNC: 100 MG/DL (ref 70–99)
GLUCOSE SERPL-MCNC: 104 MG/DL (ref 70–99)
GLUCOSE SERPL-MCNC: 106 MG/DL (ref 70–99)
GLUCOSE SERPL-MCNC: 121 MG/DL (ref 70–99)
GLUCOSE SERPL-MCNC: 182 MG/DL (ref 70–99)
GLUCOSE SERPL-MCNC: 186 MG/DL (ref 70–99)
GLUCOSE SERPL-MCNC: 188 MG/DL (ref 70–99)
HAPTOGLOB SERPL-MCNC: 153 MG/DL (ref 35–175)
HBA1C MFR BLD: 5.5 % (ref 0–5.6)
HCT VFR BLD AUTO: 17.8 % (ref 40–53)
HCT VFR BLD AUTO: 20.3 % (ref 40–53)
HCT VFR BLD AUTO: 22.7 % (ref 40–53)
HCT VFR BLD AUTO: 23.4 % (ref 40–53)
HCT VFR BLD AUTO: 23.7 % (ref 40–53)
HCT VFR BLD AUTO: 24.1 % (ref 40–53)
HCT VFR BLD AUTO: 33.1 % (ref 40–53)
HGB BLD-MCNC: 10.9 G/DL (ref 13.3–17.7)
HGB BLD-MCNC: 6.1 G/DL (ref 13.3–17.7)
HGB BLD-MCNC: 6.4 G/DL (ref 13.3–17.7)
HGB BLD-MCNC: 6.5 G/DL (ref 13.3–17.7)
HGB BLD-MCNC: 6.7 G/DL (ref 13.3–17.7)
HGB BLD-MCNC: 7.4 G/DL (ref 13.3–17.7)
HGB BLD-MCNC: 7.6 G/DL (ref 13.3–17.7)
HGB BLD-MCNC: 7.6 G/DL (ref 13.3–17.7)
HGB BLD-MCNC: 7.8 G/DL (ref 13.3–17.7)
HGB BLD-MCNC: 7.8 G/DL (ref 13.3–17.7)
HGB BLD-MCNC: 7.9 G/DL (ref 13.3–17.7)
HGB BLD-MCNC: 8 G/DL (ref 13.3–17.7)
HGB BLD-MCNC: 9 G/DL (ref 13.3–17.7)
IMM GRANULOCYTES # BLD: 0 10E9/L (ref 0–0.4)
IMM GRANULOCYTES NFR BLD: 0.2 %
INR PPP: 1.1 (ref 0.86–1.14)
INR PPP: 1.13 (ref 0.86–1.14)
INR PPP: 1.24 (ref 0.86–1.14)
INR PPP: 1.25 (ref 0.86–1.14)
INTERPRETATION ECG - MUSE: NORMAL
IRON SATN MFR SERPL: 10 % (ref 15–46)
IRON SERPL-MCNC: 19 UG/DL (ref 35–180)
LDH SERPL L TO P-CCNC: 181 U/L (ref 85–227)
LYMPHOCYTES # BLD AUTO: 0.6 10E9/L (ref 0.8–5.3)
LYMPHOCYTES NFR BLD AUTO: 9.7 %
MCH RBC QN AUTO: 29.2 PG (ref 26.5–33)
MCH RBC QN AUTO: 29.4 PG (ref 26.5–33)
MCH RBC QN AUTO: 29.5 PG (ref 26.5–33)
MCH RBC QN AUTO: 29.7 PG (ref 26.5–33)
MCH RBC QN AUTO: 29.7 PG (ref 26.5–33)
MCH RBC QN AUTO: 29.9 PG (ref 26.5–33)
MCH RBC QN AUTO: 30.8 PG (ref 26.5–33)
MCHC RBC AUTO-ENTMCNC: 32.4 G/DL (ref 31.5–36.5)
MCHC RBC AUTO-ENTMCNC: 32.9 G/DL (ref 31.5–36.5)
MCHC RBC AUTO-ENTMCNC: 33 G/DL (ref 31.5–36.5)
MCHC RBC AUTO-ENTMCNC: 33.5 G/DL (ref 31.5–36.5)
MCHC RBC AUTO-ENTMCNC: 33.8 G/DL (ref 31.5–36.5)
MCHC RBC AUTO-ENTMCNC: 33.8 G/DL (ref 31.5–36.5)
MCHC RBC AUTO-ENTMCNC: 34.3 G/DL (ref 31.5–36.5)
MCV RBC AUTO: 87 FL (ref 78–100)
MCV RBC AUTO: 87 FL (ref 78–100)
MCV RBC AUTO: 88 FL (ref 78–100)
MCV RBC AUTO: 89 FL (ref 78–100)
MCV RBC AUTO: 89 FL (ref 78–100)
MCV RBC AUTO: 90 FL (ref 78–100)
MCV RBC AUTO: 94 FL (ref 78–100)
MONOCYTES # BLD AUTO: 0.8 10E9/L (ref 0–1.3)
MONOCYTES NFR BLD AUTO: 12 %
NEUTROPHILS # BLD AUTO: 4.9 10E9/L (ref 1.6–8.3)
NEUTROPHILS NFR BLD AUTO: 74.1 %
NRBC # BLD AUTO: 0 10*3/UL
NRBC BLD AUTO-RTO: 0 /100
NUM BPU REQUESTED: 1
NUM BPU REQUESTED: 3
PHOSPHATE SERPL-MCNC: 3.4 MG/DL (ref 2.5–4.5)
PLATELET # BLD AUTO: 126 10E9/L (ref 150–450)
PLATELET # BLD AUTO: 128 10E9/L (ref 150–450)
PLATELET # BLD AUTO: 133 10E9/L (ref 150–450)
PLATELET # BLD AUTO: 135 10E9/L (ref 150–450)
PLATELET # BLD AUTO: 146 10E9/L (ref 150–450)
PLATELET # BLD AUTO: 152 10E9/L (ref 150–450)
PLATELET # BLD AUTO: 152 10E9/L (ref 150–450)
PLATELET # BLD AUTO: 179 10E9/L (ref 150–450)
POTASSIUM SERPL-SCNC: 3.6 MMOL/L (ref 3.4–5.3)
POTASSIUM SERPL-SCNC: 5 MMOL/L (ref 3.4–5.3)
POTASSIUM SERPL-SCNC: 5.2 MMOL/L (ref 3.4–5.3)
POTASSIUM SERPL-SCNC: 5.4 MMOL/L (ref 3.4–5.3)
POTASSIUM SERPL-SCNC: 5.5 MMOL/L (ref 3.4–5.3)
POTASSIUM SERPL-SCNC: 5.5 MMOL/L (ref 3.4–5.3)
POTASSIUM SERPL-SCNC: 5.8 MMOL/L (ref 3.4–5.3)
PROT SERPL-MCNC: 7.8 G/DL (ref 6.8–8.8)
RBC # BLD AUTO: 2.04 10E12/L (ref 4.4–5.9)
RBC # BLD AUTO: 2.28 10E12/L (ref 4.4–5.9)
RBC # BLD AUTO: 2.56 10E12/L (ref 4.4–5.9)
RBC # BLD AUTO: 2.67 10E12/L (ref 4.4–5.9)
RBC # BLD AUTO: 2.68 10E12/L (ref 4.4–5.9)
RBC # BLD AUTO: 2.69 10E12/L (ref 4.4–5.9)
RBC # BLD AUTO: 3.54 10E12/L (ref 4.4–5.9)
RETICS # AUTO: 37.4 10E9/L (ref 25–95)
RETICS/RBC NFR AUTO: 1.4 % (ref 0.5–2)
SODIUM SERPL-SCNC: 131 MMOL/L (ref 133–144)
SODIUM SERPL-SCNC: 131 MMOL/L (ref 133–144)
SODIUM SERPL-SCNC: 133 MMOL/L (ref 133–144)
SODIUM SERPL-SCNC: 134 MMOL/L (ref 133–144)
SODIUM SERPL-SCNC: 134 MMOL/L (ref 133–144)
SPECIMEN EXP DATE BLD: NORMAL
SPECIMEN EXP DATE BLD: NORMAL
TIBC SERPL-MCNC: 192 UG/DL (ref 240–430)
TRANSFUSION STATUS PATIENT QL: NORMAL
WBC # BLD AUTO: 5 10E9/L (ref 4–11)
WBC # BLD AUTO: 5.7 10E9/L (ref 4–11)
WBC # BLD AUTO: 5.8 10E9/L (ref 4–11)
WBC # BLD AUTO: 6.5 10E9/L (ref 4–11)
WBC # BLD AUTO: 6.5 10E9/L (ref 4–11)
WBC # BLD AUTO: 6.6 10E9/L (ref 4–11)
WBC # BLD AUTO: 7.4 10E9/L (ref 4–11)

## 2018-01-01 PROCEDURE — C1769 GUIDE WIRE: HCPCS

## 2018-01-01 PROCEDURE — 36415 COLL VENOUS BLD VENIPUNCTURE: CPT | Performed by: HOSPITALIST

## 2018-01-01 PROCEDURE — 25000128 H RX IP 250 OP 636: Performed by: RADIOLOGY

## 2018-01-01 PROCEDURE — 86900 BLOOD TYPING SEROLOGIC ABO: CPT | Performed by: INTERNAL MEDICINE

## 2018-01-01 PROCEDURE — 71000014 ZZH RECOVERY PHASE 1 LEVEL 2 FIRST HR: Performed by: SURGERY

## 2018-01-01 PROCEDURE — 85049 AUTOMATED PLATELET COUNT: CPT | Performed by: RADIOLOGY

## 2018-01-01 PROCEDURE — 25000131 ZZH RX MED GY IP 250 OP 636 PS 637: Mod: GY | Performed by: HOSPITALIST

## 2018-01-01 PROCEDURE — 93010 ELECTROCARDIOGRAM REPORT: CPT | Performed by: INTERNAL MEDICINE

## 2018-01-01 PROCEDURE — 12000007 ZZH R&B INTERMEDIATE

## 2018-01-01 PROCEDURE — 85730 THROMBOPLASTIN TIME PARTIAL: CPT | Performed by: SURGERY

## 2018-01-01 PROCEDURE — 85027 COMPLETE CBC AUTOMATED: CPT | Performed by: HOSPITALIST

## 2018-01-01 PROCEDURE — 86850 RBC ANTIBODY SCREEN: CPT | Performed by: SURGERY

## 2018-01-01 PROCEDURE — 05CY0ZZ EXTIRPATION OF MATTER FROM UPPER VEIN, OPEN APPROACH: ICD-10-PCS | Performed by: SURGERY

## 2018-01-01 PROCEDURE — 00000146 ZZHCL STATISTIC GLUCOSE BY METER IP

## 2018-01-01 PROCEDURE — 85025 COMPLETE CBC W/AUTO DIFF WBC: CPT | Performed by: HOSPITALIST

## 2018-01-01 PROCEDURE — 25000128 H RX IP 250 OP 636: Performed by: SURGERY

## 2018-01-01 PROCEDURE — 40000170 ZZH STATISTIC PRE-PROCEDURE ASSESSMENT II: Performed by: SURGERY

## 2018-01-01 PROCEDURE — 25000128 H RX IP 250 OP 636: Performed by: NURSE ANESTHETIST, CERTIFIED REGISTERED

## 2018-01-01 PROCEDURE — 25000132 ZZH RX MED GY IP 250 OP 250 PS 637: Mod: GY | Performed by: HOSPITALIST

## 2018-01-01 PROCEDURE — 12000000 ZZH R&B MED SURG/OB

## 2018-01-01 PROCEDURE — A9270 NON-COVERED ITEM OR SERVICE: HCPCS | Mod: GY | Performed by: HOSPITALIST

## 2018-01-01 PROCEDURE — 85610 PROTHROMBIN TIME: CPT | Performed by: RADIOLOGY

## 2018-01-01 PROCEDURE — 27210742 ZZH CATH CR1

## 2018-01-01 PROCEDURE — 27210906 ZZH KIT CR8

## 2018-01-01 PROCEDURE — 27211193 ZZ H WOUND GLUE CR1

## 2018-01-01 PROCEDURE — 27210886 ZZH ACCESSORY CR5

## 2018-01-01 PROCEDURE — 36415 COLL VENOUS BLD VENIPUNCTURE: CPT | Performed by: RADIOLOGY

## 2018-01-01 PROCEDURE — 40000141 ZZH STATISTIC PERIPHERAL IV START W/O US GUIDANCE

## 2018-01-01 PROCEDURE — 85018 HEMOGLOBIN: CPT | Performed by: HOSPITALIST

## 2018-01-01 PROCEDURE — 27210804 ZZH SHEATH CR3

## 2018-01-01 PROCEDURE — 40000133 ZZH STATISTIC OT WARD VISIT: Performed by: OCCUPATIONAL THERAPIST

## 2018-01-01 PROCEDURE — 99153 MOD SED SAME PHYS/QHP EA: CPT

## 2018-01-01 PROCEDURE — C1725 CATH, TRANSLUMIN NON-LASER: HCPCS

## 2018-01-01 PROCEDURE — 80048 BASIC METABOLIC PNL TOTAL CA: CPT | Performed by: HOSPITALIST

## 2018-01-01 PROCEDURE — 40000257 ZZH STATISTIC CONSULT NO CHARGE VASC ACCESS

## 2018-01-01 PROCEDURE — 83615 LACTATE (LD) (LDH) ENZYME: CPT | Performed by: HOSPITALIST

## 2018-01-01 PROCEDURE — 25000128 H RX IP 250 OP 636: Performed by: INTERNAL MEDICINE

## 2018-01-01 PROCEDURE — 10140 I&D HMTMA SEROMA/FLUID COLLJ: CPT | Mod: 78 | Performed by: SURGERY

## 2018-01-01 PROCEDURE — 25000132 ZZH RX MED GY IP 250 OP 250 PS 637: Mod: GY | Performed by: SURGERY

## 2018-01-01 PROCEDURE — 37000008 ZZH ANESTHESIA TECHNICAL FEE, 1ST 30 MIN: Performed by: SURGERY

## 2018-01-01 PROCEDURE — 86901 BLOOD TYPING SEROLOGIC RH(D): CPT | Performed by: INTERNAL MEDICINE

## 2018-01-01 PROCEDURE — 40000854 ZZH STATISTIC SIMPLE TUBE INSERTION/CHARGE, PORT, CATH, FISTULOGRAM

## 2018-01-01 PROCEDURE — 36000063 ZZH SURGERY LEVEL 4 EA 15 ADDTL MIN: Performed by: SURGERY

## 2018-01-01 PROCEDURE — 80048 BASIC METABOLIC PNL TOTAL CA: CPT | Performed by: SURGERY

## 2018-01-01 PROCEDURE — 85045 AUTOMATED RETICULOCYTE COUNT: CPT | Performed by: HOSPITALIST

## 2018-01-01 PROCEDURE — 85610 PROTHROMBIN TIME: CPT | Performed by: SURGERY

## 2018-01-01 PROCEDURE — 27210845 ZZH DEVICE INFLATION CR5

## 2018-01-01 PROCEDURE — A9270 NON-COVERED ITEM OR SERVICE: HCPCS | Mod: GY | Performed by: SURGERY

## 2018-01-01 PROCEDURE — 37000009 ZZH ANESTHESIA TECHNICAL FEE, EACH ADDTL 15 MIN: Performed by: SURGERY

## 2018-01-01 PROCEDURE — 25000125 ZZHC RX 250: Performed by: NURSE ANESTHETIST, CERTIFIED REGISTERED

## 2018-01-01 PROCEDURE — 0JCF0ZZ EXTIRPATION OF MATTER FROM LEFT UPPER ARM SUBCUTANEOUS TISSUE AND FASCIA, OPEN APPROACH: ICD-10-PCS | Performed by: SURGERY

## 2018-01-01 PROCEDURE — 86901 BLOOD TYPING SEROLOGIC RH(D): CPT | Performed by: SURGERY

## 2018-01-01 PROCEDURE — 85730 THROMBOPLASTIN TIME PARTIAL: CPT | Performed by: RADIOLOGY

## 2018-01-01 PROCEDURE — 40000003 ZZH STATISTIC IR STAFF TIME IN THE OR

## 2018-01-01 PROCEDURE — 83010 ASSAY OF HAPTOGLOBIN QUANT: CPT | Performed by: HOSPITALIST

## 2018-01-01 PROCEDURE — 90937 HEMODIALYSIS REPEATED EVAL: CPT

## 2018-01-01 PROCEDURE — 86923 COMPATIBILITY TEST ELECTRIC: CPT | Performed by: SURGERY

## 2018-01-01 PROCEDURE — 99232 SBSQ HOSP IP/OBS MODERATE 35: CPT | Performed by: HOSPITALIST

## 2018-01-01 PROCEDURE — P9016 RBC LEUKOCYTES REDUCED: HCPCS | Performed by: INTERNAL MEDICINE

## 2018-01-01 PROCEDURE — 25000125 ZZHC RX 250

## 2018-01-01 PROCEDURE — C1750 CATH, HEMODIALYSIS,LONG-TERM: HCPCS

## 2018-01-01 PROCEDURE — 99232 SBSQ HOSP IP/OBS MODERATE 35: CPT | Mod: 24 | Performed by: SURGERY

## 2018-01-01 PROCEDURE — 36415 COLL VENOUS BLD VENIPUNCTURE: CPT | Performed by: SURGERY

## 2018-01-01 PROCEDURE — 40000277 XR SURGERY CARM FLUORO LESS THAN 5 MIN W STILLS

## 2018-01-01 PROCEDURE — 25000125 ZZHC RX 250: Performed by: INTERNAL MEDICINE

## 2018-01-01 PROCEDURE — 25000128 H RX IP 250 OP 636

## 2018-01-01 PROCEDURE — 97166 OT EVAL MOD COMPLEX 45 MIN: CPT | Mod: GO | Performed by: OCCUPATIONAL THERAPIST

## 2018-01-01 PROCEDURE — 25000125 ZZHC RX 250: Performed by: RADIOLOGY

## 2018-01-01 PROCEDURE — 99152 MOD SED SAME PHYS/QHP 5/>YRS: CPT

## 2018-01-01 PROCEDURE — 36000093 ZZH SURGERY LEVEL 4 1ST 30 MIN: Performed by: SURGERY

## 2018-01-01 PROCEDURE — 5A1D70Z PERFORMANCE OF URINARY FILTRATION, INTERMITTENT, LESS THAN 6 HOURS PER DAY: ICD-10-PCS | Performed by: INTERNAL MEDICINE

## 2018-01-01 PROCEDURE — 80053 COMPREHEN METABOLIC PANEL: CPT | Performed by: HOSPITALIST

## 2018-01-01 PROCEDURE — C1750 CATH, HEMODIALYSIS,LONG-TERM: HCPCS | Performed by: SURGERY

## 2018-01-01 PROCEDURE — 82728 ASSAY OF FERRITIN: CPT | Performed by: HOSPITALIST

## 2018-01-01 PROCEDURE — 25500064 ZZH RX 255 OP 636: Performed by: RADIOLOGY

## 2018-01-01 PROCEDURE — 97535 SELF CARE MNGMENT TRAINING: CPT | Mod: GO | Performed by: OCCUPATIONAL THERAPIST

## 2018-01-01 PROCEDURE — 27210794 ZZH OR GENERAL SUPPLY STERILE: Performed by: SURGERY

## 2018-01-01 PROCEDURE — 85060 BLOOD SMEAR INTERPRETATION: CPT | Performed by: HOSPITALIST

## 2018-01-01 PROCEDURE — 83036 HEMOGLOBIN GLYCOSYLATED A1C: CPT | Performed by: HOSPITALIST

## 2018-01-01 PROCEDURE — 80069 RENAL FUNCTION PANEL: CPT | Performed by: INTERNAL MEDICINE

## 2018-01-01 PROCEDURE — 71000027 ZZH RECOVERY PHASE 2 EACH 15 MINS: Performed by: SURGERY

## 2018-01-01 PROCEDURE — 99223 1ST HOSP IP/OBS HIGH 75: CPT | Mod: AI | Performed by: HOSPITALIST

## 2018-01-01 PROCEDURE — 36901 INTRO CATH DIALYSIS CIRCUIT: CPT

## 2018-01-01 PROCEDURE — 40000065 ZZH STATISTIC EKG NON-CHARGEABLE

## 2018-01-01 PROCEDURE — 83540 ASSAY OF IRON: CPT | Performed by: HOSPITALIST

## 2018-01-01 PROCEDURE — 25000125 ZZHC RX 250: Performed by: SURGERY

## 2018-01-01 PROCEDURE — 36558 INSERT TUNNELED CV CATH: CPT | Mod: LT

## 2018-01-01 PROCEDURE — 36000077 ZZH SURGERY LEVEL 6 W FLUORO 1ST 30 MIN: Performed by: SURGERY

## 2018-01-01 PROCEDURE — 0JH60XZ INSERTION OF TUNNELED VASCULAR ACCESS DEVICE INTO CHEST SUBCUTANEOUS TISSUE AND FASCIA, OPEN APPROACH: ICD-10-PCS | Performed by: RADIOLOGY

## 2018-01-01 PROCEDURE — 99203 OFFICE O/P NEW LOW 30 MIN: CPT | Mod: ZP | Performed by: SURGERY

## 2018-01-01 PROCEDURE — C1757 CATH, THROMBECTOMY/EMBOLECT: HCPCS | Performed by: SURGERY

## 2018-01-01 PROCEDURE — 25000128 H RX IP 250 OP 636: Performed by: NURSE PRACTITIONER

## 2018-01-01 PROCEDURE — 36831 OPEN THROMBECT AV FISTULA: CPT | Mod: 79 | Performed by: SURGERY

## 2018-01-01 PROCEDURE — 85027 COMPLETE CBC AUTOMATED: CPT | Performed by: SURGERY

## 2018-01-01 PROCEDURE — 93005 ELECTROCARDIOGRAM TRACING: CPT

## 2018-01-01 PROCEDURE — 86850 RBC ANTIBODY SCREEN: CPT | Performed by: INTERNAL MEDICINE

## 2018-01-01 PROCEDURE — 25000566 ZZH SEVOFLURANE, EA 15 MIN: Performed by: SURGERY

## 2018-01-01 PROCEDURE — 71000012 ZZH RECOVERY PHASE 1 LEVEL 1 FIRST HR: Performed by: SURGERY

## 2018-01-01 PROCEDURE — 99221 1ST HOSP IP/OBS SF/LOW 40: CPT | Mod: 24 | Performed by: SURGERY

## 2018-01-01 PROCEDURE — 93971 EXTREMITY STUDY: CPT | Mod: LT

## 2018-01-01 PROCEDURE — 27210905 ZZH KIT CR7

## 2018-01-01 PROCEDURE — G0463 HOSPITAL OUTPT CLINIC VISIT: HCPCS

## 2018-01-01 PROCEDURE — 86900 BLOOD TYPING SEROLOGIC ABO: CPT | Performed by: SURGERY

## 2018-01-01 PROCEDURE — 63400005 ZZH RX 634: Performed by: INTERNAL MEDICINE

## 2018-01-01 PROCEDURE — 36832 AV FISTULA REVISION OPEN: CPT | Performed by: SURGERY

## 2018-01-01 PROCEDURE — 40000847 ZZHCL STATISTIC MORPHOLOGY W/INTERP HISTOLOGY TC 85060: Performed by: HOSPITALIST

## 2018-01-01 PROCEDURE — P9016 RBC LEUKOCYTES REDUCED: HCPCS | Performed by: SURGERY

## 2018-01-01 PROCEDURE — 02HV03Z INSERTION OF INFUSION DEVICE INTO SUPERIOR VENA CAVA, OPEN APPROACH: ICD-10-PCS | Performed by: RADIOLOGY

## 2018-01-01 PROCEDURE — 93971 EXTREMITY STUDY: CPT | Mod: RT

## 2018-01-01 PROCEDURE — 36000075 ZZH SURGERY LEVEL 6 EA 15 ADDTL MIN: Performed by: SURGERY

## 2018-01-01 PROCEDURE — C1768 GRAFT, VASCULAR: HCPCS | Performed by: SURGERY

## 2018-01-01 PROCEDURE — 86923 COMPATIBILITY TEST ELECTRIC: CPT | Performed by: INTERNAL MEDICINE

## 2018-01-01 PROCEDURE — 85018 HEMOGLOBIN: CPT | Performed by: INTERNAL MEDICINE

## 2018-01-01 PROCEDURE — 83550 IRON BINDING TEST: CPT | Performed by: HOSPITALIST

## 2018-01-01 DEVICE — IMPLANTABLE DEVICE: Type: IMPLANTABLE DEVICE | Site: ARM | Status: FUNCTIONAL

## 2018-01-01 RX ORDER — ONDANSETRON 2 MG/ML
4 INJECTION INTRAMUSCULAR; INTRAVENOUS EVERY 6 HOURS PRN
Status: DISCONTINUED | OUTPATIENT
Start: 2018-01-01 | End: 2019-01-01 | Stop reason: HOSPADM

## 2018-01-01 RX ORDER — NICOTINE POLACRILEX 4 MG
15-30 LOZENGE BUCCAL
Status: DISCONTINUED | OUTPATIENT
Start: 2018-01-01 | End: 2018-01-01

## 2018-01-01 RX ORDER — LIDOCAINE HYDROCHLORIDE 10 MG/ML
INJECTION, SOLUTION INFILTRATION; PERINEURAL
Status: DISCONTINUED
Start: 2018-01-01 | End: 2018-01-01 | Stop reason: HOSPADM

## 2018-01-01 RX ORDER — LIDOCAINE 40 MG/G
CREAM TOPICAL
Status: DISCONTINUED | OUTPATIENT
Start: 2018-01-01 | End: 2018-01-01 | Stop reason: HOSPADM

## 2018-01-01 RX ORDER — ONDANSETRON 4 MG/1
4 TABLET, ORALLY DISINTEGRATING ORAL EVERY 6 HOURS PRN
Status: DISCONTINUED | OUTPATIENT
Start: 2018-01-01 | End: 2019-01-01 | Stop reason: HOSPADM

## 2018-01-01 RX ORDER — CEFAZOLIN SODIUM 2 G/100ML
2 INJECTION, SOLUTION INTRAVENOUS
Status: COMPLETED | OUTPATIENT
Start: 2018-01-01 | End: 2018-01-01

## 2018-01-01 RX ORDER — BUPIVACAINE HYDROCHLORIDE 5 MG/ML
INJECTION, SOLUTION PERINEURAL PRN
Status: DISCONTINUED | OUTPATIENT
Start: 2018-01-01 | End: 2018-01-01 | Stop reason: HOSPADM

## 2018-01-01 RX ORDER — AMLODIPINE BESYLATE 5 MG/1
5 TABLET ORAL 2 TIMES DAILY
Status: DISCONTINUED | OUTPATIENT
Start: 2018-01-01 | End: 2019-01-01 | Stop reason: HOSPADM

## 2018-01-01 RX ORDER — ONDANSETRON 2 MG/ML
4 INJECTION INTRAMUSCULAR; INTRAVENOUS EVERY 30 MIN PRN
Status: DISCONTINUED | OUTPATIENT
Start: 2018-01-01 | End: 2018-01-01 | Stop reason: HOSPADM

## 2018-01-01 RX ORDER — FLUMAZENIL 0.1 MG/ML
0.2 INJECTION, SOLUTION INTRAVENOUS
Status: DISCONTINUED | OUTPATIENT
Start: 2018-01-01 | End: 2018-01-01 | Stop reason: HOSPADM

## 2018-01-01 RX ORDER — SODIUM CHLORIDE 9 MG/ML
INJECTION, SOLUTION INTRAVENOUS CONTINUOUS
Status: DISCONTINUED | OUTPATIENT
Start: 2018-01-01 | End: 2019-01-01 | Stop reason: HOSPADM

## 2018-01-01 RX ORDER — IOPAMIDOL 612 MG/ML
50 INJECTION, SOLUTION INTRAVASCULAR ONCE
Status: COMPLETED | OUTPATIENT
Start: 2018-01-01 | End: 2018-01-01

## 2018-01-01 RX ORDER — NALOXONE HYDROCHLORIDE 0.4 MG/ML
.1-.4 INJECTION, SOLUTION INTRAMUSCULAR; INTRAVENOUS; SUBCUTANEOUS
Status: DISCONTINUED | OUTPATIENT
Start: 2018-01-01 | End: 2019-01-01 | Stop reason: HOSPADM

## 2018-01-01 RX ORDER — NICOTINE POLACRILEX 4 MG
15-30 LOZENGE BUCCAL
Status: DISCONTINUED | OUTPATIENT
Start: 2018-01-01 | End: 2019-01-01 | Stop reason: HOSPADM

## 2018-01-01 RX ORDER — BISACODYL 10 MG
10 SUPPOSITORY, RECTAL RECTAL DAILY PRN
Status: DISCONTINUED | OUTPATIENT
Start: 2018-01-01 | End: 2019-01-01 | Stop reason: HOSPADM

## 2018-01-01 RX ORDER — CEFAZOLIN SODIUM 1 G/3ML
1 INJECTION, POWDER, FOR SOLUTION INTRAMUSCULAR; INTRAVENOUS EVERY 12 HOURS
Status: DISCONTINUED | OUTPATIENT
Start: 2018-01-01 | End: 2018-01-01

## 2018-01-01 RX ORDER — DOXERCALCIFEROL 4 UG/2ML
2 INJECTION INTRAVENOUS
Status: COMPLETED | OUTPATIENT
Start: 2018-01-01 | End: 2018-01-01

## 2018-01-01 RX ORDER — OXYCODONE HYDROCHLORIDE 5 MG/1
5 TABLET ORAL EVERY 6 HOURS PRN
Qty: 10 TABLET | Refills: 0 | Status: ON HOLD | OUTPATIENT
Start: 2018-01-01 | End: 2018-01-01

## 2018-01-01 RX ORDER — PROPOFOL 10 MG/ML
INJECTION, EMULSION INTRAVENOUS PRN
Status: DISCONTINUED | OUTPATIENT
Start: 2018-01-01 | End: 2018-01-01

## 2018-01-01 RX ORDER — FENTANYL CITRATE 50 UG/ML
25-50 INJECTION, SOLUTION INTRAMUSCULAR; INTRAVENOUS EVERY 5 MIN PRN
Status: DISCONTINUED | OUTPATIENT
Start: 2018-01-01 | End: 2018-01-01 | Stop reason: HOSPADM

## 2018-01-01 RX ORDER — LISINOPRIL 20 MG/1
20 TABLET ORAL DAILY
Status: DISCONTINUED | OUTPATIENT
Start: 2018-01-01 | End: 2019-01-01 | Stop reason: HOSPADM

## 2018-01-01 RX ORDER — FENTANYL CITRATE 50 UG/ML
INJECTION, SOLUTION INTRAMUSCULAR; INTRAVENOUS PRN
Status: DISCONTINUED | OUTPATIENT
Start: 2018-01-01 | End: 2018-01-01

## 2018-01-01 RX ORDER — LABETALOL HYDROCHLORIDE 5 MG/ML
10 INJECTION, SOLUTION INTRAVENOUS
Status: DISCONTINUED | OUTPATIENT
Start: 2018-01-01 | End: 2019-01-01 | Stop reason: HOSPADM

## 2018-01-01 RX ORDER — HEPARIN SODIUM 1000 [USP'U]/ML
INJECTION, SOLUTION INTRAVENOUS; SUBCUTANEOUS PRN
Status: DISCONTINUED | OUTPATIENT
Start: 2018-01-01 | End: 2018-01-01 | Stop reason: HOSPADM

## 2018-01-01 RX ORDER — DEXTROSE MONOHYDRATE 25 G/50ML
25-50 INJECTION, SOLUTION INTRAVENOUS
Status: DISCONTINUED | OUTPATIENT
Start: 2018-01-01 | End: 2018-01-01 | Stop reason: HOSPADM

## 2018-01-01 RX ORDER — AMOXICILLIN 250 MG
2 CAPSULE ORAL 2 TIMES DAILY PRN
Status: DISCONTINUED | OUTPATIENT
Start: 2018-01-01 | End: 2019-01-01 | Stop reason: HOSPADM

## 2018-01-01 RX ORDER — HEPARIN SODIUM 1000 [USP'U]/ML
INJECTION, SOLUTION INTRAVENOUS; SUBCUTANEOUS
Status: COMPLETED
Start: 2018-01-01 | End: 2018-01-01

## 2018-01-01 RX ORDER — LIDOCAINE HYDROCHLORIDE 10 MG/ML
1-30 INJECTION, SOLUTION EPIDURAL; INFILTRATION; INTRACAUDAL; PERINEURAL
Status: COMPLETED | OUTPATIENT
Start: 2018-01-01 | End: 2018-01-01

## 2018-01-01 RX ORDER — CEFAZOLIN SODIUM 1 G/3ML
1 INJECTION, POWDER, FOR SOLUTION INTRAMUSCULAR; INTRAVENOUS SEE ADMIN INSTRUCTIONS
Status: DISCONTINUED | OUTPATIENT
Start: 2018-01-01 | End: 2018-01-01 | Stop reason: HOSPADM

## 2018-01-01 RX ORDER — ONDANSETRON 2 MG/ML
INJECTION INTRAMUSCULAR; INTRAVENOUS PRN
Status: DISCONTINUED | OUTPATIENT
Start: 2018-01-01 | End: 2018-01-01

## 2018-01-01 RX ORDER — NALOXONE HYDROCHLORIDE 0.4 MG/ML
.1-.4 INJECTION, SOLUTION INTRAMUSCULAR; INTRAVENOUS; SUBCUTANEOUS
Status: DISCONTINUED | OUTPATIENT
Start: 2018-01-01 | End: 2018-01-01 | Stop reason: HOSPADM

## 2018-01-01 RX ORDER — DEXTROSE MONOHYDRATE 25 G/50ML
25-50 INJECTION, SOLUTION INTRAVENOUS
Status: CANCELLED | OUTPATIENT
Start: 2018-01-01

## 2018-01-01 RX ORDER — HEPARIN SODIUM 1000 [USP'U]/ML
INJECTION, SOLUTION INTRAVENOUS; SUBCUTANEOUS
Status: DISCONTINUED
Start: 2018-01-01 | End: 2018-01-01 | Stop reason: HOSPADM

## 2018-01-01 RX ORDER — ACETAMINOPHEN 650 MG/1
650 SUPPOSITORY RECTAL EVERY 4 HOURS PRN
Status: DISCONTINUED | OUTPATIENT
Start: 2018-01-01 | End: 2019-01-01 | Stop reason: HOSPADM

## 2018-01-01 RX ORDER — MAGNESIUM HYDROXIDE 1200 MG/15ML
LIQUID ORAL PRN
Status: DISCONTINUED | OUTPATIENT
Start: 2018-01-01 | End: 2018-01-01 | Stop reason: HOSPADM

## 2018-01-01 RX ORDER — AMOXICILLIN 250 MG
1 CAPSULE ORAL 2 TIMES DAILY PRN
Status: DISCONTINUED | OUTPATIENT
Start: 2018-01-01 | End: 2019-01-01 | Stop reason: HOSPADM

## 2018-01-01 RX ORDER — SODIUM CHLORIDE, SODIUM LACTATE, POTASSIUM CHLORIDE, CALCIUM CHLORIDE 600; 310; 30; 20 MG/100ML; MG/100ML; MG/100ML; MG/100ML
INJECTION, SOLUTION INTRAVENOUS CONTINUOUS
Status: DISCONTINUED | OUTPATIENT
Start: 2018-01-01 | End: 2018-01-01 | Stop reason: HOSPADM

## 2018-01-01 RX ORDER — NICOTINE POLACRILEX 4 MG
15-30 LOZENGE BUCCAL
Status: DISCONTINUED | OUTPATIENT
Start: 2018-01-01 | End: 2018-01-01 | Stop reason: HOSPADM

## 2018-01-01 RX ORDER — DOXERCALCIFEROL 4 UG/2ML
2 INJECTION INTRAVENOUS
Status: DISCONTINUED | OUTPATIENT
Start: 2018-01-01 | End: 2018-01-01

## 2018-01-01 RX ORDER — DEXTROSE MONOHYDRATE 25 G/50ML
25-50 INJECTION, SOLUTION INTRAVENOUS
Status: DISCONTINUED | OUTPATIENT
Start: 2018-01-01 | End: 2019-01-01 | Stop reason: HOSPADM

## 2018-01-01 RX ORDER — FENTANYL CITRATE 50 UG/ML
INJECTION, SOLUTION INTRAMUSCULAR; INTRAVENOUS
Status: DISCONTINUED
Start: 2018-01-01 | End: 2018-01-01 | Stop reason: HOSPADM

## 2018-01-01 RX ORDER — FENTANYL CITRATE 50 UG/ML
INJECTION, SOLUTION INTRAMUSCULAR; INTRAVENOUS
Status: COMPLETED
Start: 2018-01-01 | End: 2018-01-01

## 2018-01-01 RX ORDER — CLOPIDOGREL BISULFATE 75 MG/1
75 TABLET ORAL DAILY
Qty: 30 TABLET | Refills: 1 | Status: ON HOLD | OUTPATIENT
Start: 2018-01-01 | End: 2018-01-01

## 2018-01-01 RX ORDER — EPHEDRINE SULFATE 50 MG/ML
INJECTION, SOLUTION INTRAMUSCULAR; INTRAVENOUS; SUBCUTANEOUS PRN
Status: DISCONTINUED | OUTPATIENT
Start: 2018-01-01 | End: 2018-01-01

## 2018-01-01 RX ORDER — HEPARIN SODIUM 1000 [USP'U]/ML
3 INJECTION, SOLUTION INTRAVENOUS; SUBCUTANEOUS ONCE
Status: DISCONTINUED | OUTPATIENT
Start: 2018-01-01 | End: 2019-01-01 | Stop reason: HOSPADM

## 2018-01-01 RX ORDER — POLYETHYLENE GLYCOL 3350 17 G/17G
17 POWDER, FOR SOLUTION ORAL DAILY PRN
Status: DISCONTINUED | OUTPATIENT
Start: 2018-01-01 | End: 2019-01-01 | Stop reason: HOSPADM

## 2018-01-01 RX ORDER — SODIUM CHLORIDE 9 MG/ML
INJECTION, SOLUTION INTRAVENOUS CONTINUOUS PRN
Status: DISCONTINUED | OUTPATIENT
Start: 2018-01-01 | End: 2018-01-01

## 2018-01-01 RX ORDER — ACETAMINOPHEN 325 MG/1
650 TABLET ORAL EVERY 4 HOURS PRN
Status: DISCONTINUED | OUTPATIENT
Start: 2018-01-01 | End: 2019-01-01 | Stop reason: HOSPADM

## 2018-01-01 RX ORDER — CLOPIDOGREL BISULFATE 75 MG/1
75 TABLET ORAL DAILY
Status: DISCONTINUED | OUTPATIENT
Start: 2018-01-01 | End: 2019-01-01 | Stop reason: HOSPADM

## 2018-01-01 RX ORDER — NEOSTIGMINE METHYLSULFATE 1 MG/ML
VIAL (ML) INJECTION PRN
Status: DISCONTINUED | OUTPATIENT
Start: 2018-01-01 | End: 2018-01-01

## 2018-01-01 RX ORDER — FENTANYL CITRATE 50 UG/ML
25-50 INJECTION, SOLUTION INTRAMUSCULAR; INTRAVENOUS
Status: DISCONTINUED | OUTPATIENT
Start: 2018-01-01 | End: 2018-01-01 | Stop reason: HOSPADM

## 2018-01-01 RX ORDER — LIDOCAINE 40 MG/G
CREAM TOPICAL
Status: DISCONTINUED | OUTPATIENT
Start: 2018-01-01 | End: 2019-01-01 | Stop reason: HOSPADM

## 2018-01-01 RX ORDER — MEPERIDINE HYDROCHLORIDE 25 MG/ML
12.5 INJECTION INTRAMUSCULAR; INTRAVENOUS; SUBCUTANEOUS
Status: DISCONTINUED | OUTPATIENT
Start: 2018-01-01 | End: 2018-01-01 | Stop reason: HOSPADM

## 2018-01-01 RX ORDER — PROPOFOL 10 MG/ML
INJECTION, EMULSION INTRAVENOUS CONTINUOUS PRN
Status: DISCONTINUED | OUTPATIENT
Start: 2018-01-01 | End: 2018-01-01

## 2018-01-01 RX ORDER — NALOXONE HYDROCHLORIDE 0.4 MG/ML
.1-.4 INJECTION, SOLUTION INTRAMUSCULAR; INTRAVENOUS; SUBCUTANEOUS
Status: DISCONTINUED | OUTPATIENT
Start: 2018-01-01 | End: 2018-01-01

## 2018-01-01 RX ORDER — DEXTROSE MONOHYDRATE 25 G/50ML
25-50 INJECTION, SOLUTION INTRAVENOUS
Status: DISCONTINUED | OUTPATIENT
Start: 2018-01-01 | End: 2018-01-01

## 2018-01-01 RX ORDER — LIDOCAINE HYDROCHLORIDE 10 MG/ML
1-30 INJECTION, SOLUTION EPIDURAL; INFILTRATION; INTRACAUDAL; PERINEURAL
Status: DISCONTINUED | OUTPATIENT
Start: 2018-01-01 | End: 2018-01-01 | Stop reason: HOSPADM

## 2018-01-01 RX ORDER — ONDANSETRON 4 MG/1
4 TABLET, ORALLY DISINTEGRATING ORAL EVERY 30 MIN PRN
Status: DISCONTINUED | OUTPATIENT
Start: 2018-01-01 | End: 2018-01-01 | Stop reason: HOSPADM

## 2018-01-01 RX ORDER — GLYCOPYRROLATE 0.2 MG/ML
INJECTION, SOLUTION INTRAMUSCULAR; INTRAVENOUS PRN
Status: DISCONTINUED | OUTPATIENT
Start: 2018-01-01 | End: 2018-01-01

## 2018-01-01 RX ORDER — CLOPIDOGREL BISULFATE 75 MG/1
75 TABLET ORAL DAILY
Status: DISCONTINUED | OUTPATIENT
Start: 2018-01-01 | End: 2018-01-01

## 2018-01-01 RX ORDER — NICOTINE POLACRILEX 4 MG
15-30 LOZENGE BUCCAL
Status: CANCELLED | OUTPATIENT
Start: 2018-01-01

## 2018-01-01 RX ORDER — CEFAZOLIN SODIUM 2 G/100ML
2 INJECTION, SOLUTION INTRAVENOUS
Status: DISCONTINUED | OUTPATIENT
Start: 2018-01-01 | End: 2018-01-01 | Stop reason: HOSPADM

## 2018-01-01 RX ORDER — LIDOCAINE 40 MG/G
CREAM TOPICAL
Status: CANCELLED | OUTPATIENT
Start: 2018-01-01

## 2018-01-01 RX ORDER — HYDROMORPHONE HYDROCHLORIDE 1 MG/ML
.3-.5 INJECTION, SOLUTION INTRAMUSCULAR; INTRAVENOUS; SUBCUTANEOUS EVERY 10 MIN PRN
Status: DISCONTINUED | OUTPATIENT
Start: 2018-01-01 | End: 2018-01-01 | Stop reason: HOSPADM

## 2018-01-01 RX ORDER — CEFAZOLIN SODIUM 2 G/100ML
2 INJECTION, SOLUTION INTRAVENOUS ONCE
Status: COMPLETED | OUTPATIENT
Start: 2018-01-01 | End: 2018-01-01

## 2018-01-01 RX ORDER — HEPARIN SODIUM 1000 [USP'U]/ML
INJECTION, SOLUTION INTRAVENOUS; SUBCUTANEOUS PRN
Status: DISCONTINUED | OUTPATIENT
Start: 2018-01-01 | End: 2018-01-01

## 2018-01-01 RX ORDER — LEVETIRACETAM 500 MG/1
500 TABLET ORAL DAILY
Status: DISCONTINUED | OUTPATIENT
Start: 2018-01-01 | End: 2019-01-01 | Stop reason: HOSPADM

## 2018-01-01 RX ORDER — ALLOPURINOL 100 MG/1
100 TABLET ORAL DAILY
Status: DISCONTINUED | OUTPATIENT
Start: 2018-01-01 | End: 2019-01-01 | Stop reason: HOSPADM

## 2018-01-01 RX ORDER — CEFAZOLIN SODIUM 2 G/50ML
SOLUTION INTRAVENOUS
Status: COMPLETED
Start: 2018-01-01 | End: 2018-01-01

## 2018-01-01 RX ORDER — LIDOCAINE HYDROCHLORIDE 20 MG/ML
INJECTION, SOLUTION INFILTRATION; PERINEURAL PRN
Status: DISCONTINUED | OUTPATIENT
Start: 2018-01-01 | End: 2018-01-01

## 2018-01-01 RX ORDER — HEPARIN SODIUM 1000 [USP'U]/ML
500-6000 INJECTION, SOLUTION INTRAVENOUS; SUBCUTANEOUS
Status: COMPLETED | OUTPATIENT
Start: 2018-01-01 | End: 2018-01-01

## 2018-01-01 RX ADMIN — DOXERCALCIFEROL 2 MCG: 4 INJECTION, SOLUTION INTRAVENOUS at 10:07

## 2018-01-01 RX ADMIN — INSULIN ASPART 1 UNITS: 100 INJECTION, SOLUTION INTRAVENOUS; SUBCUTANEOUS at 11:39

## 2018-01-01 RX ADMIN — PROPOFOL 90 MG: 10 INJECTION, EMULSION INTRAVENOUS at 10:47

## 2018-01-01 RX ADMIN — ALLOPURINOL 100 MG: 100 TABLET ORAL at 11:50

## 2018-01-01 RX ADMIN — OMEPRAZOLE 20 MG: 20 CAPSULE, DELAYED RELEASE ORAL at 08:05

## 2018-01-01 RX ADMIN — INSULIN GLARGINE 5 UNITS: 100 INJECTION, SOLUTION SUBCUTANEOUS at 22:16

## 2018-01-01 RX ADMIN — LEVETIRACETAM 500 MG: 500 TABLET, FILM COATED ORAL at 08:06

## 2018-01-01 RX ADMIN — OMEPRAZOLE 20 MG: 20 CAPSULE, DELAYED RELEASE ORAL at 08:39

## 2018-01-01 RX ADMIN — SODIUM CHLORIDE 250 ML: 9 INJECTION, SOLUTION INTRAVENOUS at 08:58

## 2018-01-01 RX ADMIN — LIDOCAINE HYDROCHLORIDE 3 ML: 10 INJECTION, SOLUTION INFILTRATION; PERINEURAL at 12:46

## 2018-01-01 RX ADMIN — OMEPRAZOLE 20 MG: 20 CAPSULE, DELAYED RELEASE ORAL at 07:57

## 2018-01-01 RX ADMIN — CALCIUM ACETATE 1334 MG: 667 CAPSULE ORAL at 08:43

## 2018-01-01 RX ADMIN — Medication 5 MG: at 14:14

## 2018-01-01 RX ADMIN — AMLODIPINE BESYLATE 5 MG: 2.5 TABLET ORAL at 20:38

## 2018-01-01 RX ADMIN — FENTANYL CITRATE 25 MCG: 50 INJECTION, SOLUTION INTRAMUSCULAR; INTRAVENOUS at 11:18

## 2018-01-01 RX ADMIN — LISINOPRIL 20 MG: 20 TABLET ORAL at 13:24

## 2018-01-01 RX ADMIN — FENTANYL CITRATE 50 MCG: 50 INJECTION INTRAMUSCULAR; INTRAVENOUS at 15:56

## 2018-01-01 RX ADMIN — LISINOPRIL 20 MG: 20 TABLET ORAL at 11:49

## 2018-01-01 RX ADMIN — CALCIUM ACETATE 1334 MG: 667 CAPSULE ORAL at 08:00

## 2018-01-01 RX ADMIN — AMLODIPINE BESYLATE 5 MG: 2.5 TABLET ORAL at 21:33

## 2018-01-01 RX ADMIN — SENNOSIDES AND DOCUSATE SODIUM 1 TABLET: 8.6; 5 TABLET ORAL at 08:57

## 2018-01-01 RX ADMIN — ONDANSETRON 4 MG: 2 INJECTION INTRAMUSCULAR; INTRAVENOUS at 13:20

## 2018-01-01 RX ADMIN — LIDOCAINE HYDROCHLORIDE 80 MG: 20 INJECTION, SOLUTION INFILTRATION; PERINEURAL at 10:46

## 2018-01-01 RX ADMIN — FENTANYL CITRATE 25 MCG: 50 INJECTION, SOLUTION INTRAMUSCULAR; INTRAVENOUS at 11:04

## 2018-01-01 RX ADMIN — FENTANYL CITRATE 25 MCG: 50 INJECTION, SOLUTION INTRAMUSCULAR; INTRAVENOUS at 13:07

## 2018-01-01 RX ADMIN — CEFAZOLIN SODIUM 2 G: 2 INJECTION, SOLUTION INTRAVENOUS at 10:54

## 2018-01-01 RX ADMIN — PROPOFOL 20 MG: 10 INJECTION, EMULSION INTRAVENOUS at 13:18

## 2018-01-01 RX ADMIN — METOPROLOL SUCCINATE 25 MG: 25 TABLET, EXTENDED RELEASE ORAL at 11:49

## 2018-01-01 RX ADMIN — CEFAZOLIN SODIUM 2 G: 2 INJECTION, SOLUTION INTRAVENOUS at 04:06

## 2018-01-01 RX ADMIN — SODIUM CHLORIDE 300 ML: 9 INJECTION, SOLUTION INTRAVENOUS at 08:58

## 2018-01-01 RX ADMIN — Medication 5 MG: at 14:29

## 2018-01-01 RX ADMIN — LIDOCAINE HYDROCHLORIDE 0.5 ML: 10 INJECTION, SOLUTION INFILTRATION; PERINEURAL at 08:58

## 2018-01-01 RX ADMIN — DOXERCALCIFEROL 2 MCG: 4 INJECTION, SOLUTION INTRAVENOUS at 17:48

## 2018-01-01 RX ADMIN — FENTANYL CITRATE 25 MCG: 50 INJECTION, SOLUTION INTRAMUSCULAR; INTRAVENOUS at 10:49

## 2018-01-01 RX ADMIN — PHENYLEPHRINE HYDROCHLORIDE 100 MCG: 10 INJECTION, SOLUTION INTRAMUSCULAR; INTRAVENOUS; SUBCUTANEOUS at 11:51

## 2018-01-01 RX ADMIN — LIDOCAINE HYDROCHLORIDE 0.5 ML: 10 INJECTION, SOLUTION INFILTRATION; PERINEURAL at 08:59

## 2018-01-01 RX ADMIN — LIDOCAINE HYDROCHLORIDE 25 ML: 10 INJECTION, SOLUTION EPIDURAL; INFILTRATION; INTRACAUDAL; PERINEURAL at 16:07

## 2018-01-01 RX ADMIN — FENTANYL CITRATE 25 MCG: 50 INJECTION INTRAMUSCULAR; INTRAVENOUS at 15:57

## 2018-01-01 RX ADMIN — OMEPRAZOLE 20 MG: 20 CAPSULE, DELAYED RELEASE ORAL at 11:50

## 2018-01-01 RX ADMIN — INSULIN ASPART 1 UNITS: 100 INJECTION, SOLUTION INTRAVENOUS; SUBCUTANEOUS at 18:23

## 2018-01-01 RX ADMIN — MIDAZOLAM HYDROCHLORIDE 0.5 MG: 1 INJECTION, SOLUTION INTRAMUSCULAR; INTRAVENOUS at 17:13

## 2018-01-01 RX ADMIN — HEPARIN SODIUM 1500 ML: 1000 INJECTION, SOLUTION INTRAVENOUS; SUBCUTANEOUS at 13:47

## 2018-01-01 RX ADMIN — DEXMEDETOMIDINE HYDROCHLORIDE 0.3 MCG/KG/HR: 100 INJECTION, SOLUTION INTRAVENOUS at 13:33

## 2018-01-01 RX ADMIN — FENTANYL CITRATE 50 MCG: 50 INJECTION INTRAMUSCULAR; INTRAVENOUS at 12:30

## 2018-01-01 RX ADMIN — HEPARIN SODIUM 6000 UNITS: 1000 INJECTION, SOLUTION INTRAVENOUS; SUBCUTANEOUS at 16:05

## 2018-01-01 RX ADMIN — CALCIUM ACETATE 1334 MG: 667 CAPSULE ORAL at 17:53

## 2018-01-01 RX ADMIN — LEVETIRACETAM 500 MG: 500 TABLET, FILM COATED ORAL at 08:40

## 2018-01-01 RX ADMIN — SENNOSIDES AND DOCUSATE SODIUM 2 TABLET: 8.6; 5 TABLET ORAL at 13:55

## 2018-01-01 RX ADMIN — INSULIN GLARGINE 5 UNITS: 100 INJECTION, SOLUTION SUBCUTANEOUS at 21:33

## 2018-01-01 RX ADMIN — MIDAZOLAM HYDROCHLORIDE 1 MG: 1 INJECTION, SOLUTION INTRAMUSCULAR; INTRAVENOUS at 12:30

## 2018-01-01 RX ADMIN — INSULIN ASPART 1 UNITS: 100 INJECTION, SOLUTION INTRAVENOUS; SUBCUTANEOUS at 08:01

## 2018-01-01 RX ADMIN — CALCIUM ACETATE 1334 MG: 667 CAPSULE ORAL at 08:52

## 2018-01-01 RX ADMIN — Medication 5 MG: at 13:50

## 2018-01-01 RX ADMIN — AMLODIPINE BESYLATE 5 MG: 2.5 TABLET ORAL at 11:50

## 2018-01-01 RX ADMIN — ACETAMINOPHEN 650 MG: 325 TABLET, FILM COATED ORAL at 08:39

## 2018-01-01 RX ADMIN — GLYCOPYRROLATE 0.4 MG: 0.2 INJECTION, SOLUTION INTRAMUSCULAR; INTRAVENOUS at 11:59

## 2018-01-01 RX ADMIN — CLOPIDOGREL BISULFATE 75 MG: 75 TABLET, FILM COATED ORAL at 13:24

## 2018-01-01 RX ADMIN — ACETAMINOPHEN 650 MG: 325 TABLET, FILM COATED ORAL at 19:54

## 2018-01-01 RX ADMIN — CEFAZOLIN SODIUM 2 G: 2 INJECTION, SOLUTION INTRAVENOUS at 13:17

## 2018-01-01 RX ADMIN — PROPOFOL 20 MG: 10 INJECTION, EMULSION INTRAVENOUS at 13:07

## 2018-01-01 RX ADMIN — INSULIN ASPART 1 UNITS: 100 INJECTION, SOLUTION INTRAVENOUS; SUBCUTANEOUS at 17:54

## 2018-01-01 RX ADMIN — SODIUM CHLORIDE 300 ML: 9 INJECTION, SOLUTION INTRAVENOUS at 10:07

## 2018-01-01 RX ADMIN — PROPOFOL 50 MCG/KG/MIN: 10 INJECTION, EMULSION INTRAVENOUS at 13:06

## 2018-01-01 RX ADMIN — CISATRACURIUM BESYLATE 10 MG: 2 INJECTION INTRAVENOUS at 10:48

## 2018-01-01 RX ADMIN — SODIUM CHLORIDE 10000 UNITS: 9 INJECTION, SOLUTION INTRAVENOUS at 16:03

## 2018-01-01 RX ADMIN — ACETAMINOPHEN 650 MG: 325 TABLET, FILM COATED ORAL at 21:43

## 2018-01-01 RX ADMIN — EPOETIN ALFA 10000 UNITS: 10000 SOLUTION INTRAVENOUS; SUBCUTANEOUS at 18:01

## 2018-01-01 RX ADMIN — HEPARIN SODIUM 10000 UNITS: 10000 INJECTION, SOLUTION INTRAVENOUS; SUBCUTANEOUS at 12:26

## 2018-01-01 RX ADMIN — LISINOPRIL 20 MG: 20 TABLET ORAL at 08:39

## 2018-01-01 RX ADMIN — FENTANYL CITRATE 25 MCG: 50 INJECTION, SOLUTION INTRAMUSCULAR; INTRAVENOUS at 13:15

## 2018-01-01 RX ADMIN — MIDAZOLAM HYDROCHLORIDE 0.5 MG: 1 INJECTION, SOLUTION INTRAMUSCULAR; INTRAVENOUS at 10:49

## 2018-01-01 RX ADMIN — CALCIUM ACETATE 1334 MG: 667 CAPSULE ORAL at 17:54

## 2018-01-01 RX ADMIN — FENTANYL CITRATE 50 MCG: 50 INJECTION, SOLUTION INTRAMUSCULAR; INTRAVENOUS at 10:55

## 2018-01-01 RX ADMIN — PHENYLEPHRINE HYDROCHLORIDE 100 MCG: 10 INJECTION, SOLUTION INTRAMUSCULAR; INTRAVENOUS; SUBCUTANEOUS at 11:39

## 2018-01-01 RX ADMIN — METOPROLOL SUCCINATE 25 MG: 25 TABLET, EXTENDED RELEASE ORAL at 08:06

## 2018-01-01 RX ADMIN — IOPAMIDOL 30 ML: 612 INJECTION, SOLUTION INTRAVENOUS at 11:23

## 2018-01-01 RX ADMIN — IOPAMIDOL 85 ML: 612 INJECTION, SOLUTION INTRAVENOUS at 11:57

## 2018-01-01 RX ADMIN — CALCIUM ACETATE 1334 MG: 667 CAPSULE ORAL at 14:34

## 2018-01-01 RX ADMIN — AMLODIPINE BESYLATE 5 MG: 2.5 TABLET ORAL at 21:04

## 2018-01-01 RX ADMIN — PHENYLEPHRINE HYDROCHLORIDE 100 MCG: 10 INJECTION, SOLUTION INTRAMUSCULAR; INTRAVENOUS; SUBCUTANEOUS at 11:43

## 2018-01-01 RX ADMIN — EPOETIN ALFA 8000 UNITS: 4000 SOLUTION INTRAVENOUS; SUBCUTANEOUS at 15:14

## 2018-01-01 RX ADMIN — FENTANYL CITRATE 25 MCG: 50 INJECTION INTRAMUSCULAR; INTRAVENOUS at 17:12

## 2018-01-01 RX ADMIN — ACETAMINOPHEN 650 MG: 325 TABLET, FILM COATED ORAL at 08:05

## 2018-01-01 RX ADMIN — SODIUM CHLORIDE 1000 ML: 900 IRRIGANT IRRIGATION at 13:47

## 2018-01-01 RX ADMIN — EPOETIN ALFA 10000 UNITS: 10000 SOLUTION INTRAVENOUS; SUBCUTANEOUS at 10:13

## 2018-01-01 RX ADMIN — FENTANYL CITRATE 25 MCG: 50 INJECTION, SOLUTION INTRAMUSCULAR; INTRAVENOUS at 13:34

## 2018-01-01 RX ADMIN — IOPAMIDOL 25 ML: 612 INJECTION, SOLUTION INTRAVENOUS at 17:24

## 2018-01-01 RX ADMIN — HEPARIN SODIUM 4000 UNITS: 1000 INJECTION, SOLUTION INTRAVENOUS; SUBCUTANEOUS at 15:02

## 2018-01-01 RX ADMIN — AMLODIPINE BESYLATE 5 MG: 2.5 TABLET ORAL at 08:40

## 2018-01-01 RX ADMIN — LIDOCAINE HYDROCHLORIDE 10 ML: 10 INJECTION, SOLUTION EPIDURAL; INFILTRATION; INTRACAUDAL; PERINEURAL at 16:06

## 2018-01-01 RX ADMIN — ALLOPURINOL 100 MG: 100 TABLET ORAL at 08:39

## 2018-01-01 RX ADMIN — SODIUM CHLORIDE 250 ML: 9 INJECTION, SOLUTION INTRAVENOUS at 17:44

## 2018-01-01 RX ADMIN — FENTANYL CITRATE 25 MCG: 50 INJECTION, SOLUTION INTRAMUSCULAR; INTRAVENOUS at 13:30

## 2018-01-01 RX ADMIN — CALCIUM ACETATE 1334 MG: 667 CAPSULE ORAL at 11:39

## 2018-01-01 RX ADMIN — CLOPIDOGREL BISULFATE 75 MG: 75 TABLET, FILM COATED ORAL at 08:52

## 2018-01-01 RX ADMIN — Medication 5 MG: at 14:02

## 2018-01-01 RX ADMIN — ALLOPURINOL 100 MG: 100 TABLET ORAL at 07:58

## 2018-01-01 RX ADMIN — PHENYLEPHRINE HYDROCHLORIDE 100 MCG: 10 INJECTION, SOLUTION INTRAMUSCULAR; INTRAVENOUS; SUBCUTANEOUS at 15:50

## 2018-01-01 RX ADMIN — IOPAMIDOL 20 ML: 612 INJECTION, SOLUTION INTRAVENOUS at 13:02

## 2018-01-01 RX ADMIN — NEOSTIGMINE METHYLSULFATE 2.5 MG: 1 INJECTION, SOLUTION INTRAVENOUS at 11:59

## 2018-01-01 RX ADMIN — LIDOCAINE HYDROCHLORIDE 10 ML: 10 INJECTION, SOLUTION INFILTRATION; PERINEURAL at 16:06

## 2018-01-01 RX ADMIN — LIDOCAINE HYDROCHLORIDE 1 ML: 10 INJECTION, SOLUTION INFILTRATION; PERINEURAL at 11:21

## 2018-01-01 RX ADMIN — SODIUM CHLORIDE 250 ML: 9 INJECTION, SOLUTION INTRAVENOUS at 10:07

## 2018-01-01 RX ADMIN — ACETAMINOPHEN 650 MG: 325 TABLET, FILM COATED ORAL at 20:00

## 2018-01-01 RX ADMIN — METOPROLOL SUCCINATE 25 MG: 25 TABLET, EXTENDED RELEASE ORAL at 13:24

## 2018-01-01 RX ADMIN — AMLODIPINE BESYLATE 5 MG: 2.5 TABLET ORAL at 23:07

## 2018-01-01 RX ADMIN — ALLOPURINOL 100 MG: 100 TABLET ORAL at 08:06

## 2018-01-01 RX ADMIN — CALCIUM ACETATE 1334 MG: 667 CAPSULE ORAL at 18:12

## 2018-01-01 RX ADMIN — SODIUM CHLORIDE: 9 INJECTION, SOLUTION INTRAVENOUS at 13:00

## 2018-01-01 RX ADMIN — LIDOCAINE HYDROCHLORIDE 2 ML: 10 INJECTION, SOLUTION INFILTRATION; PERINEURAL at 17:18

## 2018-01-01 RX ADMIN — CEFAZOLIN SODIUM 1 G: 1 INJECTION, POWDER, FOR SOLUTION INTRAMUSCULAR; INTRAVENOUS at 22:16

## 2018-01-01 RX ADMIN — HEPARIN SODIUM 10000 UNITS: 10000 INJECTION, SOLUTION INTRAVENOUS; SUBCUTANEOUS at 11:23

## 2018-01-01 RX ADMIN — MIDAZOLAM HYDROCHLORIDE 1 MG: 1 INJECTION, SOLUTION INTRAMUSCULAR; INTRAVENOUS at 16:56

## 2018-01-01 RX ADMIN — INSULIN ASPART 1 UNITS: 100 INJECTION, SOLUTION INTRAVENOUS; SUBCUTANEOUS at 18:15

## 2018-01-01 RX ADMIN — HEPARIN SODIUM 10000 UNITS: 10000 INJECTION, SOLUTION INTRAVENOUS; SUBCUTANEOUS at 17:22

## 2018-01-01 RX ADMIN — INSULIN GLARGINE 5 UNITS: 100 INJECTION, SOLUTION SUBCUTANEOUS at 22:01

## 2018-01-01 RX ADMIN — ONDANSETRON 4 MG: 2 INJECTION INTRAMUSCULAR; INTRAVENOUS at 11:59

## 2018-01-01 RX ADMIN — Medication 5 MG: at 14:41

## 2018-01-01 RX ADMIN — INSULIN GLARGINE 5 UNITS: 100 INJECTION, SOLUTION SUBCUTANEOUS at 22:43

## 2018-01-01 RX ADMIN — CEFAZOLIN SODIUM 2 G: 2 INJECTION, SOLUTION INTRAVENOUS at 11:48

## 2018-01-01 RX ADMIN — CALCIUM ACETATE 1334 MG: 667 CAPSULE ORAL at 13:55

## 2018-01-01 RX ADMIN — MIDAZOLAM HYDROCHLORIDE 0.5 MG: 1 INJECTION, SOLUTION INTRAMUSCULAR; INTRAVENOUS at 11:26

## 2018-01-01 RX ADMIN — HEPARIN SODIUM 6000 UNITS: 1000 INJECTION INTRAVENOUS; SUBCUTANEOUS at 16:05

## 2018-01-01 RX ADMIN — SENNOSIDES AND DOCUSATE SODIUM 1 TABLET: 8.6; 5 TABLET ORAL at 13:25

## 2018-01-01 RX ADMIN — PROPOFOL 20 MG: 10 INJECTION, EMULSION INTRAVENOUS at 13:34

## 2018-01-01 RX ADMIN — LEVETIRACETAM 500 MG: 500 TABLET, FILM COATED ORAL at 11:49

## 2018-01-01 RX ADMIN — CALCIUM ACETATE 1334 MG: 667 CAPSULE ORAL at 18:30

## 2018-01-01 RX ADMIN — METOPROLOL SUCCINATE 25 MG: 25 TABLET, EXTENDED RELEASE ORAL at 08:43

## 2018-01-01 RX ADMIN — ACETAMINOPHEN 650 MG: 325 TABLET, FILM COATED ORAL at 20:38

## 2018-01-01 RX ADMIN — LEVETIRACETAM 500 MG: 500 TABLET, FILM COATED ORAL at 08:52

## 2018-01-01 RX ADMIN — SODIUM CHLORIDE: 9 INJECTION, SOLUTION INTRAVENOUS at 10:30

## 2018-01-01 RX ADMIN — INSULIN GLARGINE 5 UNITS: 100 INJECTION, SOLUTION SUBCUTANEOUS at 23:05

## 2018-01-01 RX ADMIN — ACETAMINOPHEN 650 MG: 325 TABLET, FILM COATED ORAL at 07:57

## 2018-01-01 RX ADMIN — AMLODIPINE BESYLATE 5 MG: 2.5 TABLET ORAL at 08:05

## 2018-01-01 RX ADMIN — PHENYLEPHRINE HYDROCHLORIDE 100 MCG: 10 INJECTION, SOLUTION INTRAMUSCULAR; INTRAVENOUS; SUBCUTANEOUS at 14:46

## 2018-01-01 RX ADMIN — LEVETIRACETAM 500 MG: 500 TABLET, FILM COATED ORAL at 07:57

## 2018-01-01 RX ADMIN — FENTANYL CITRATE 50 MCG: 50 INJECTION INTRAMUSCULAR; INTRAVENOUS at 16:56

## 2018-01-01 RX ADMIN — MIDAZOLAM HYDROCHLORIDE 0.5 MG: 1 INJECTION, SOLUTION INTRAMUSCULAR; INTRAVENOUS at 11:17

## 2018-01-01 RX ADMIN — INSULIN GLARGINE 5 UNITS: 100 INJECTION, SOLUTION SUBCUTANEOUS at 22:38

## 2018-01-01 RX ADMIN — SODIUM CHLORIDE 250 ML: 9 INJECTION, SOLUTION INTRAVENOUS at 17:43

## 2018-01-01 RX ADMIN — PHENYLEPHRINE HYDROCHLORIDE 100 MCG: 10 INJECTION, SOLUTION INTRAMUSCULAR; INTRAVENOUS; SUBCUTANEOUS at 11:29

## 2018-01-01 RX ADMIN — ALLOPURINOL 100 MG: 100 TABLET ORAL at 08:52

## 2018-01-01 RX ADMIN — MIDAZOLAM HYDROCHLORIDE 0.5 MG: 1 INJECTION, SOLUTION INTRAMUSCULAR; INTRAVENOUS at 11:05

## 2018-01-01 RX ADMIN — FENTANYL CITRATE 25 MCG: 50 INJECTION, SOLUTION INTRAMUSCULAR; INTRAVENOUS at 11:26

## 2018-01-01 RX ADMIN — OMEPRAZOLE 20 MG: 20 CAPSULE, DELAYED RELEASE ORAL at 08:52

## 2018-01-01 RX ADMIN — FENTANYL CITRATE 25 MCG: 50 INJECTION, SOLUTION INTRAMUSCULAR; INTRAVENOUS at 11:09

## 2018-01-01 RX ADMIN — FENTANYL CITRATE 25 MCG: 50 INJECTION, SOLUTION INTRAMUSCULAR; INTRAVENOUS at 12:07

## 2018-01-01 RX ADMIN — CALCIUM ACETATE 1334 MG: 667 CAPSULE ORAL at 11:49

## 2018-01-01 RX ADMIN — CEFAZOLIN SODIUM 2 G: 2 SOLUTION INTRAVENOUS at 16:01

## 2018-01-01 RX ADMIN — BUPIVACAINE HYDROCHLORIDE 30 ML: 5 INJECTION, SOLUTION PERINEURAL at 16:06

## 2018-01-01 RX ADMIN — AMLODIPINE BESYLATE 5 MG: 2.5 TABLET ORAL at 20:47

## 2018-01-01 RX ADMIN — LISINOPRIL 20 MG: 20 TABLET ORAL at 08:05

## 2018-01-01 ASSESSMENT — ACTIVITIES OF DAILY LIVING (ADL)
ADLS_ACUITY_SCORE: 23
ADLS_ACUITY_SCORE: 23
ADLS_ACUITY_SCORE: 24
ADLS_ACUITY_SCORE: 23
ADLS_ACUITY_SCORE: 24
ADLS_ACUITY_SCORE: 24
ADLS_ACUITY_SCORE: 23
ADLS_ACUITY_SCORE: 23
ADLS_ACUITY_SCORE: 24
ADLS_ACUITY_SCORE: 23
ADLS_ACUITY_SCORE: 24
ADLS_ACUITY_SCORE: 23
ADLS_ACUITY_SCORE: 24

## 2018-01-01 ASSESSMENT — ENCOUNTER SYMPTOMS
SEIZURES: 1
DYSRHYTHMIAS: 1
SEIZURES: 1
DYSRHYTHMIAS: 1

## 2018-01-01 ASSESSMENT — MIFFLIN-ST. JEOR
SCORE: 1192.38
SCORE: 1208.38
SCORE: 1225.38
SCORE: 1207.38
SCORE: 1237.38
SCORE: 1222.38

## 2018-02-13 NOTE — PROGRESS NOTES
Here with wife for fistulogram, pt has had several of these.  Pt has . NKA, NPO, Pt is a fall risk. Family states pt did not take any meds this morning, but states his list is current and he took everything yesterday. Through , reviewed procedure, states understanding. Consent signed and back to IR lab.

## 2018-02-13 NOTE — PROGRESS NOTES
Interventional Radiology Pre-Procedure Sedation Assessment   Time of Assessment: 10:45 AM    Expected Level: Moderate Sedation    Indication: Sedation is required for the following type of Procedure: Left upper arm fistula fistulagram with possible intervention which could include angioplasty and/or stent placement    Sedation and procedural consent: Risks, benefits and alternatives were discussed with Patient and Spouse, Dr Nicole RAMOS Intake: Appropriately NPO for procedure    ASA Class: Class 2 - MILD SYSTEMIC DISEASE, NO ACUTE PROBLEMS, NO FUNCTIONAL LIMITATIONS.    Mallampati: Grade 2:  Soft palate, base of uvula, tonsillar pillars, and portion of posterior pharyngeal wall visible    Lungs: Lungs Clear with good breath sounds bilaterally, decreased    Heart: Normal heart sounds and rate, murmur    History and physical reviewed and no updates needed. I have reviewed the lab findings, diagnostic data, medications, and the plan for sedation. I have determined this patient to be an appropriate candidate for the planned sedation and procedure and have reassessed the patient IMMEDIATELY PRIOR to sedation and procedure.    Jasmina Morales, OTILIO CNP

## 2018-02-13 NOTE — IP AVS SNAPSHOT
MRN:[id number]                      After Visit Summary   2/13/2018    Angle Munguia    MRN: [id number]           Visit Information        Department      2/13/2018  9:47 AM [facility]          Review of your medicines      UNREVIEWED medicines. Ask your doctor about these medicines        Dose / Directions    ALLOPURINOL PO        Dose:  100 mg   Take 100 mg by mouth daily   Refills:  0       AMLODIPINE BESYLATE PO        Dose:  5 mg   Take 5 mg by mouth 2 times daily   Refills:  0       calcium acetate 667 MG Caps capsule   Commonly known as:  PHOSLO        Dose:  667 mg   Take 667 mg by mouth 3 times daily (with meals)   Refills:  0       calcium carbonate 1250 MG tablet   Commonly known as:  OS-SHIRA 500 mg [place]. Ca        Dose:  500 mg   Take 500 mg by mouth 2 times daily   Refills:  0       cefdinir 300 MG capsule   Commonly known as:  OMNICEF   Indication:  HCAP   Used for:  Community acquired pneumonia        Dose:  300 mg   Take 1 capsule (300 mg) by mouth every 48 hours   Quantity:  5 capsule   Refills:  0       DIALYVITE Tabs        Take  by mouth.   Refills:  0       ISOSORBIDE MONONITRATE PO        Dose:  15 mg   Take 15 mg by mouth daily   Refills:  0       lactulose 10 GM/15ML solution   Commonly known as:  CHRONULAC        Dose:  10 g   Take 10 g by mouth as needed for constipation   Refills:  0       levETIRAcetam 500 MG tablet   Commonly known as:  KEPPRA        Dose:  500 mg   Take 1 tablet (500 mg) by mouth daily   Quantity:  30 tablet   Refills:  0       LISINOPRIL PO        Dose:  20 mg   Take 20 mg by mouth daily   Refills:  0       metoprolol succinate 25 MG 24 hr tablet   Commonly known as:  TOPROL-XL   Used for:  Atrial fibrillation (H)        Dose:  25 mg   Take 1 tablet (25 mg) by mouth daily   Quantity:  30 tablet   Refills:  0       nitroGLYcerin 0.4 MG sublingual tablet   Commonly known as:  NITROSTAT        Dose:  0.4 mg   Place 0.4 mg under the  tongue every 5 minutes as needed for chest pain For chest pain place 1 tablet under the tongue every 5 minutes for 3 doses. If symptoms persist 5 minutes after 1st dose call 911.   Refills:  0       OMEPRAZOLE PO        Dose:  20 mg   Take 20 mg by mouth every morning   Refills:  0       TYLENOL PO        Dose:  1000 mg   Take 1,000 mg by mouth every 8 hours as needed   Refills:  0       Urea 40 % Crea   Used for:  Xerosis cutis        Externally apply  topically 2 times daily. As directed.   Quantity:  180 g   Refills:  3       VITAMIN D3 PO        Dose:  400 Units   Take 400 Units by mouth daily   Refills:  0                Protect others around you: Learn how to safely use, store and throw away your medicines at www.disposemymeds.org.         Follow-ups after your visit        Your next 10 appointments already scheduled     Feb 13, 2018 11:30 AM CST   (Arrive by 11:15 AM)   IR DIALYSIS FISTULOGRAM LEFT with SHIR1   [facility] Interventional Radiology ([facility])    [address] [place] [id number] [phone]           1. Do not eat any solid food or milk products for 6 hours prior to the exam. You may drink clear liquids until 2 hours prior to the exam. Clear liquids include the following: water, Jell-O, clear broth, apple juice, or any non-carbonated drink that you can see through (no pop/soda!). 2. If you have diabetes, check with your doctors to see if your insulin needs to be adjusted for the exam. 3. If you are taking an oral hypoglycemia agent used to control your glucose, this medication needs to be held on the morning of your exam, but may be taken after the exam when you resume eating. 4. The morning of the exam you may brush your teeth and take your other medication as directed with a sip of water. 5. It is important to tell the Radiology nurse if you have any allergies, especially to IV contrast material. 6. It is important to tell the Radiology nurse if you  think you might be pregnant. 7. Make arrangements for someone to drive you home. A responsible adult must stay with you during the next 24 hours. 8. Bring a list of all drugs you are taking; include supplements and over-the-counter medications. 9. Wear comfortable clothes and leave valuables at home.               Care Instructions        Further instructions from your care team       Fistulagram Discharge Instructions     After you go home:      You may resume your normal diet    Have an adult stay with you for 6 hours if you received sedation       For 24 hours - due to the sedation you received:    Relax and take it easy    Do NOT make any important or legal decisions    Do NOT drive or operate machines at home or at work    Do NOT drink alcohol    Care of Puncture Site:      For the first 48 hrs, check your puncture site every couple hours while you are awake     You may remove/change the bandage tomorrow    You may shower tomorrow    No tub baths, whirlpools or swimming until your puncture site has fully healed    If puncture site starts to bleed - apply light pressure to site for 5 minutes or until bleeding stops     Activity       You should try to elevate your arm for the remainder of today to prevent increased swelling    You may go back to your normal activity in 24 hours     Wait 48 hours before lifting, straining, exercise or other strenuous activity    Medicines:      You may resume all your medications    For minor pain, you may take Acetaminophen (Tylenol) or Ibuprofen (Advil)                 Call the provider who ordered this procedure if:      Increased pain or a large or growing hard lump around the site    Blood or fluid is draining from the site    The site is red, swollen, hot or tender    Chills or a fever greater than 101 F (38 C)    Pain that is getting worse    Any questions or concerns      Call  911 or go to the Emergency Room if:    Severe pain or trouble breathing    Bleeding that you  "cannot control      If you have questions call:          [facility] Radiology Dept @ [phone]        The provider who performed your procedure was _________________.           Additional Information About Your Visit        MyChart Information     [facility] lets you send messages to your doctor, view your test results, renew your prescriptions, schedule appointments and more. To sign up, go to [email or web address] . Click on \"Log in\" on the left side of the screen, which will take you to the Welcome page. Then click on \"Sign up Now\" on the right side of the page.     You will be asked to enter the access code listed below, as well as some personal information. Please follow the directions to create your username and password.     Your access code is: [email or web address]  Expires: 2018 10:54 AM     Your access code will  in 90 days. If you need help or a new code, please call your [facility] clinic or [phone].        Care EveryWhere ID     This is your Care EveryWhere ID. This could be used by other organizations to access your [facility] medical records  [id number]        Your Vitals Were     Blood Pressure Pulse Temperature Respirations Pulse Oximetry       140/56 (BP Location: Right arm) 59 95.1  F (35.1  C) (Oral) 16 96%        Primary Care Provider Office Phone # Fax #    La Nena Garcia [phone]      Equal Access to Services     [facility]: Hadii kady ku hadasho Soomaali, waaxda luqadaha, qaybta kaalmada avel, waxshanae renee mensah . So [facility] [phone].    ATENCIÓN: Si habla español, tiene a nolan disposición servicios gratuitos de asistencia lingüística. Denny al [phone].    We comply with applicable federal civil rights laws and [place] laws. We do not discriminate on the basis of race, color, national origin, age, disability, sex, sexual orientation, or gender identity.            Thank you!     Thank you for choosing [facility] for your " care. Our goal is always to provide you with excellent care. Hearing back from our patients is one way we can continue to improve our services. Please take a few minutes to complete the written survey that you may receive in the mail after you visit with us. Thank you!             Medication List: This is a list of all your medications and when to take them. Check marks below indicate your daily home schedule. Keep this list as a reference.      Medications           Morning Afternoon Evening Bedtime As Needed    ALLOPURINOL PO   Take 100 mg by mouth daily                                AMLODIPINE BESYLATE PO   Take 5 mg by mouth 2 times daily                                calcium acetate 667 MG Caps capsule   Commonly known as:  PHOSLO   Take 667 mg by mouth 3 times daily (with meals)                                calcium carbonate 1250 MG tablet   Commonly known as:  OS-SHIRA 500 mg [place]. Ca   Take 500 mg by mouth 2 times daily                                cefdinir 300 MG capsule   Commonly known as:  OMNICEF   Take 1 capsule (300 mg) by mouth every 48 hours                                DIALYVITE Tabs   Take  by mouth.                                ISOSORBIDE MONONITRATE PO   Take 15 mg by mouth daily                                lactulose 10 GM/15ML solution   Commonly known as:  CHRONULAC   Take 10 g by mouth as needed for constipation                                levETIRAcetam 500 MG tablet   Commonly known as:  KEPPRA   Take 1 tablet (500 mg) by mouth daily                                LISINOPRIL PO   Take 20 mg by mouth daily                                metoprolol succinate 25 MG 24 hr tablet   Commonly known as:  TOPROL-XL   Take 1 tablet (25 mg) by mouth daily                                nitroGLYcerin 0.4 MG sublingual tablet   Commonly known as:  NITROSTAT   Place 0.4 mg under the tongue every 5 minutes as needed for chest pain For chest pain place 1 tablet under the tongue every 5 minutes  for 3 doses. If symptoms persist 5 minutes after 1st dose call 911.                                OMEPRAZOLE PO   Take 20 mg by mouth every morning                                TYLENOL PO   Take 1,000 mg by mouth every 8 hours as needed                                Urea 40 % Crea   Externally apply  topically 2 times daily. As directed.                                VITAMIN D3 PO   Take 400 Units by mouth daily

## 2018-02-13 NOTE — IP AVS SNAPSHOT
[facility] [address]    [place] [address]    Phone:  [phone]                                       After Visit Summary   2/13/2018    Angle Munguia    MRN: [id number]           After Visit Summary Signature Page     I have received my discharge instructions, and my questions have been answered. I have discussed any challenges I see with this plan with the nurse or doctor.    ..........................................................................................................................................  Patient/Patient Representative Signature      ..........................................................................................................................................  Patient Representative Print Name and Relationship to Patient    ..................................................               ................................................  Date                                            Time    ..........................................................................................................................................  Reviewed by Signature/Title    ...................................................              ..............................................  Date                                                            Time

## 2018-02-13 NOTE — PROGRESS NOTES
Discharge per w/c to home with wife. Discharge instructions reviewed with patient and wife with  present. (L) arm site remains C/D/I. IV DC'd after tolerating po fluids. VSS.

## 2018-02-13 NOTE — DISCHARGE INSTRUCTIONS
Fistulagram Discharge Instructions     After you go home:      You may resume your normal diet    Have an adult stay with you for 6 hours if you received sedation       For 24 hours - due to the sedation you received:    Relax and take it easy    Do NOT make any important or legal decisions    Do NOT drive or operate machines at home or at work    Do NOT drink alcohol    Care of Puncture Site:      For the first 48 hrs, check your puncture site every couple hours while you are awake     You may remove/change the bandage tomorrow    You may shower tomorrow    No tub baths, whirlpools or swimming until your puncture site has fully healed    If puncture site starts to bleed - apply light pressure to site for 5 minutes or until bleeding stops     Activity       You should try to elevate your arm for the remainder of today to prevent increased swelling    You may go back to your normal activity in 24 hours     Wait 48 hours before lifting, straining, exercise or other strenuous activity    Medicines:      You may resume all your medications    For minor pain, you may take Acetaminophen (Tylenol) or Ibuprofen (Advil)                 Call the provider who ordered this procedure if:      Increased pain or a large or growing hard lump around the site    Blood or fluid is draining from the site    The site is red, swollen, hot or tender    Chills or a fever greater than 101 F (38 C)    Pain that is getting worse    Any questions or concerns      Call  911 or go to the Emergency Room if:    Severe pain or trouble breathing    Bleeding that you cannot control      If you have questions call:          [facility] @ [phone]        The provider who performed your procedure was _________________.

## 2018-05-31 NOTE — PROGRESS NOTES
Pt admitted for fistulagram,  present, pre and post instructions reviewed with pt and wife, questions answered and pt and wife state understanding.

## 2018-05-31 NOTE — PROCEDURES
RADIOLOGY POST PROCEDURE NOTE w/ SEDATION  Patient name: Angle Munguia  MRN: [id number]  : 1935    Pre-procedure diagnosis: prolonged bleeding with dialysis.   Post-procedure diagnosis: Same    Procedure Date/Time: May 31, 2018  11:36 AM  Procedure: left upper extremity fistulogram with angioplasty of the subclavian vein stent.   Estimated blood loss: None  Specimen(s) collected with description: none    I determined this patient to be an appropriate candidate for the planned sedation and procedure and reassessed the patient IMMEDIATELY PRIOR to sedation and procedure.     The patient tolerated the procedure well with no immediate complications.  Significant findings:none    See imaging dictation for procedural details.    Provider name: Josselin Hoff  Assistant(s):None

## 2018-05-31 NOTE — IP AVS SNAPSHOT
MRN:[id number]                      After Visit Summary   5/31/2018    Angle Munguia    MRN: [id number]           Visit Information        Department      5/31/2018  8:19 AM [facility]          Review of your medicines      UNREVIEWED medicines. Ask your doctor about these medicines        Dose / Directions    ALLOPURINOL PO        Dose:  100 mg   Take 100 mg by mouth daily   Refills:  0       AMLODIPINE BESYLATE PO        Dose:  5 mg   Take 5 mg by mouth 2 times daily   Refills:  0       calcium acetate 667 MG Caps capsule   Commonly known as:  PHOSLO        Dose:  667 mg   Take 667 mg by mouth 3 times daily (with meals)   Refills:  0       calcium carbonate 500 MG tablet   Commonly known as:  OS-SHIRA 500 mg [place]. Ca        Dose:  500 mg   Take 500 mg by mouth 2 times daily   Refills:  0       cefdinir 300 MG capsule   Commonly known as:  OMNICEF   Indication:  HCAP   Used for:  Community acquired pneumonia        Dose:  300 mg   Take 1 capsule (300 mg) by mouth every 48 hours   Quantity:  5 capsule   Refills:  0       DIALYVITE Tabs        Take  by mouth.   Refills:  0       ISOSORBIDE MONONITRATE PO        Dose:  15 mg   Take 15 mg by mouth daily   Refills:  0       lactulose 10 GM/15ML solution   Commonly known as:  CHRONULAC        Dose:  10 g   Take 10 g by mouth as needed for constipation   Refills:  0       levETIRAcetam 500 MG tablet   Commonly known as:  KEPPRA        Dose:  500 mg   Take 1 tablet (500 mg) by mouth daily   Quantity:  30 tablet   Refills:  0       LISINOPRIL PO        Dose:  20 mg   Take 20 mg by mouth daily   Refills:  0       metoprolol succinate 25 MG 24 hr tablet   Commonly known as:  TOPROL-XL   Used for:  Atrial fibrillation (H)        Dose:  25 mg   Take 1 tablet (25 mg) by mouth daily   Quantity:  30 tablet   Refills:  0       nitroGLYcerin 0.4 MG sublingual tablet   Commonly known as:  NITROSTAT        Dose:  0.4 mg   Place 0.4 mg under the  tongue every 5 minutes as needed for chest pain For chest pain place 1 tablet under the tongue every 5 minutes for 3 doses. If symptoms persist 5 minutes after 1st dose call 911.   Refills:  0       OMEPRAZOLE PO        Dose:  20 mg   Take 20 mg by mouth every morning   Refills:  0       TYLENOL PO        Dose:  1000 mg   Take 1,000 mg by mouth every 8 hours as needed   Refills:  0       Urea 40 % Crea   Used for:  Xerosis cutis        Externally apply  topically 2 times daily. As directed.   Quantity:  180 g   Refills:  3       VITAMIN D3 PO        Dose:  400 Units   Take 400 Units by mouth daily   Refills:  0                Protect others around you: Learn how to safely use, store and throw away your medicines at www.disposemymeds.org.         Follow-ups after your visit         Care Instructions        Further instructions from your care team       Fistulagram Discharge Instructions     After you go home:      You may resume your normal diet    Have an adult stay with you for 6 hours if you received sedation       For 24 hours - due to the sedation you received:    Relax and take it easy    Do NOT make any important or legal decisions    Do NOT drive or operate machines at home or at work    Do NOT drink alcohol    Care of Puncture Site:      For the first 48 hrs, check your puncture site every couple hours while you are awake     You may remove/change the bandage tomorrow    You may shower tomorrow    No tub baths, whirlpools or swimming until your puncture site has fully healed    If puncture site starts to bleed - apply light pressure to site for 5 minutes or until bleeding stops     Activity       You should try to elevate your arm for the remainder of today to prevent increased swelling    You may go back to your normal activity in 24 hours     Wait 48 hours before lifting, straining, exercise or other strenuous activity    Medicines:      You may resume all your medications    For minor pain, you may take  "Acetaminophen (Tylenol) or Ibuprofen (Advil)                 Call the provider who ordered this procedure if:      Increased pain or a large or growing hard lump around the site    Blood or fluid is draining from the site    The site is red, swollen, hot or tender    Chills or a fever greater than 101 F (38 C)    Pain that is getting worse    Any questions or concerns      Call  911 or go to the Emergency Room if:    Severe pain or trouble breathing    Bleeding that you cannot control      If you have questions call:          [facility] Radiology Dept @ [phone]        The provider who performed your procedure was _________________.           Additional Information About Your Visit        MyChart Information     [employer] lets you send messages to your doctor, view your test results, renew your prescriptions, schedule appointments and more. To sign up, go to [email or web address] . Click on \"Log in\" on the left side of the screen, which will take you to the Welcome page. Then click on \"Sign up Now\" on the right side of the page.     You will be asked to enter the access code listed below, as well as some personal information. Please follow the directions to create your username and password.     Your access code is: [email or web address]  Expires: 2018  9:11 AM     Your access code will  in 90 days. If you need help or a new code, please call your [facility] clinic or [phone].        Care EveryWhere ID     This is your Care EveryWhere ID. This could be used by other organizations to access your [facility] medical records  [id number]        Your Vitals Were     Blood Pressure Pulse Temperature Respirations Height Weight    114/65 (BP Location: Right arm) 84 97.8  F (36.6  C) (Oral) 16 1.575 m (5' 2\") 56.7 kg (125 lb)    Pulse Oximetry BMI (Body Mass Index)                96% 22.86 kg/m2           Primary Care Provider Office Phone # Fax #    La Nena Garcia [phone]      Equal " Access to Services     [facility]: Hadii aad ku hadbernabebran Heavenlyjanel, waalma rosada luqadaha, qajuanpablota kakalyanidayo mejias. So [facility] [phone].    ATENCIÓN: Si habla español, tiene a nolan disposición servicios gratuitos de asistencia lingüística. Llame al [phone].    We comply with applicable federal civil rights laws and [place] laws. We do not discriminate on the basis of race, color, national origin, age, disability, sex, sexual orientation, or gender identity.            Thank you!     Thank you for choosing [facility] for your care. Our goal is always to provide you with excellent care. Hearing back from our patients is one way we can continue to improve our services. Please take a few minutes to complete the written survey that you may receive in the mail after you visit with us. Thank you!             Medication List: This is a list of all your medications and when to take them. Check marks below indicate your daily home schedule. Keep this list as a reference.      Medications           Morning Afternoon Evening Bedtime As Needed    ALLOPURINOL PO   Take 100 mg by mouth daily                                AMLODIPINE BESYLATE PO   Take 5 mg by mouth 2 times daily                                calcium acetate 667 MG Caps capsule   Commonly known as:  PHOSLO   Take 667 mg by mouth 3 times daily (with meals)                                calcium carbonate 500 MG tablet   Commonly known as:  OS-SHIRA 500 mg [place]. Ca   Take 500 mg by mouth 2 times daily                                cefdinir 300 MG capsule   Commonly known as:  OMNICEF   Take 1 capsule (300 mg) by mouth every 48 hours                                DIALYVITE Tabs   Take  by mouth.                                ISOSORBIDE MONONITRATE PO   Take 15 mg by mouth daily                                lactulose 10 GM/15ML solution   Commonly known as:  CHRONULAC   Take 10 g by mouth as needed for constipation                                 levETIRAcetam 500 MG tablet   Commonly known as:  KEPPRA   Take 1 tablet (500 mg) by mouth daily                                LISINOPRIL PO   Take 20 mg by mouth daily                                metoprolol succinate 25 MG 24 hr tablet   Commonly known as:  TOPROL-XL   Take 1 tablet (25 mg) by mouth daily                                nitroGLYcerin 0.4 MG sublingual tablet   Commonly known as:  NITROSTAT   Place 0.4 mg under the tongue every 5 minutes as needed for chest pain For chest pain place 1 tablet under the tongue every 5 minutes for 3 doses. If symptoms persist 5 minutes after 1st dose call 911.                                OMEPRAZOLE PO   Take 20 mg by mouth every morning                                TYLENOL PO   Take 1,000 mg by mouth every 8 hours as needed                                Urea 40 % Crea   Externally apply  topically 2 times daily. As directed.                                VITAMIN D3 PO   Take 400 Units by mouth daily

## 2018-05-31 NOTE — PROGRESS NOTES
VSS post procedure, left fistula site CDI with gauze dressing on, pt denies pain, pt discharged to home with family.

## 2018-05-31 NOTE — IP AVS SNAPSHOT
[place] [address]    [place] [address]    Phone:  [phone]                                       After Visit Summary   5/31/2018    Angle Munguia    MRN: [id number]           After Visit Summary Signature Page     I have received my discharge instructions, and my questions have been answered. I have discussed any challenges I see with this plan with the nurse or doctor.    ..........................................................................................................................................  Patient/Patient Representative Signature      ..........................................................................................................................................  Patient Representative Print Name and Relationship to Patient    ..................................................               ................................................  Date                                            Time    ..........................................................................................................................................  Reviewed by Signature/Title    ...................................................              ..............................................  Date                                                            Time

## 2018-05-31 NOTE — DISCHARGE INSTRUCTIONS
Fistulagram Discharge Instructions     After you go home:      You may resume your normal diet    Have an adult stay with you for 6 hours if you received sedation       For 24 hours - due to the sedation you received:    Relax and take it easy    Do NOT make any important or legal decisions    Do NOT drive or operate machines at home or at work    Do NOT drink alcohol    Care of Puncture Site:      For the first 48 hrs, check your puncture site every couple hours while you are awake     You may remove/change the bandage tomorrow    You may shower tomorrow    No tub baths, whirlpools or swimming until your puncture site has fully healed    If puncture site starts to bleed - apply light pressure to site for 5 minutes or until bleeding stops     Activity       You should try to elevate your arm for the remainder of today to prevent increased swelling    You may go back to your normal activity in 24 hours     Wait 48 hours before lifting, straining, exercise or other strenuous activity    Medicines:      You may resume all your medications    For minor pain, you may take Acetaminophen (Tylenol) or Ibuprofen (Advil)                 Call the provider who ordered this procedure if:      Increased pain or a large or growing hard lump around the site    Blood or fluid is draining from the site    The site is red, swollen, hot or tender    Chills or a fever greater than 101 F (38 C)    Pain that is getting worse    Any questions or concerns      Call  911 or go to the Emergency Room if:    Severe pain or trouble breathing    Bleeding that you cannot control      If you have questions call:          [facility] @ [phone]        The provider who performed your procedure was _________________.

## 2018-05-31 NOTE — PROGRESS NOTES
Interventional Radiology Pre-Procedure Sedation Assessment   Time of Assessment: 9:45 AM    Expected Level: Moderate Sedation    Indication: Sedation is required for the following type of Procedure: Left upper arm fistula fistulagram with possible angioplasty and/or stent placement    Sedation and procedural consent: Risks, benefits and alternatives were discussed with Patient, Spouse and , Dr Luis Eduardo RAMOS Intake: Appropriately NPO for procedure    ASA Class: Class 2 - MILD SYSTEMIC DISEASE, NO ACUTE PROBLEMS, NO FUNCTIONAL LIMITATIONS.    Mallampati: Grade 2:  Soft palate, base of uvula, tonsillar pillars, and portion of posterior pharyngeal wall visible    Lungs: Lungs decreased bilaterally, crackles left base    Heart: Holosystolic murmur, sinus bradycardia    Focused history and physical completed prior to procedure. I have reviewed the lab findings, diagnostic data, medications, and the plan for sedation. I have determined this patient to be an appropriate candidate for the planned sedation and procedure and have reassessed the patient IMMEDIATELY PRIOR to sedation and procedure.    Jasmina Morales, APRN CNP

## 2018-07-19 NOTE — PROGRESS NOTES
Interventional Radiology Pre-Procedure Sedation Assessment   Time of Assessment: 2:08 PM    Expected Level: Minimal Sedation    Indication: Sedation is required for the following type of Procedure: Right upper arm fistula fistulagram with possible angioplasty and/or stent placement    Sedation and procedural consent: Risks, benefits and alternatives were discussed with Patient, Spouse and , Dr Aurea RAMOS Intake: Appropriately NPO for procedure    ASA Class: Class 2 - MILD SYSTEMIC DISEASE, NO ACUTE PROBLEMS, NO FUNCTIONAL LIMITATIONS.    Mallampati: Grade 2:  Soft palate, base of uvula, tonsillar pillars, and portion of posterior pharyngeal wall visible    Lungs: Lungs Clear with good breath sounds bilaterally, decreased bilaterally    Heart: Normal heart sounds and rate, murmur    History and physical reviewed and no updates needed. I have reviewed the lab findings, diagnostic data, medications, and the plan for sedation. I have determined this patient to be an appropriate candidate for the planned sedation and procedure and have reassessed the patient IMMEDIATELY PRIOR to sedation and procedure.    Jasmina Morales, OTILIO CNP

## 2018-07-19 NOTE — PROGRESS NOTES
Interventional Radiology Intra-procedural Nursing Note    Patient Name: Angle Munguia  Medical Record Number: [id number]  Today's Date: July 19, 2018    Start Time: 1659  End of procedure time: 1718  Procedure: Left Fistulagram for arm swelling  Report given to: Cynthia MALDONADO, care suites  Time pt departs:  1735     Other Notes: Patient into IR suite 1 via cart from care suites. To table in supine position, prepped and draped with 2% chlorhexidine to left arm. VSS, normal sinus rhythm. Dr Villar in room, timeout and procedure started. Stick time 1701. 7F sheath to left venous fistula. Angioplasty to fistula. Patient tolerated procedure well. Versed 1.5 mg and fentanyl 75 mcg given. Debrief with Dr. Villar, no complications. Patient back to care suites via cart. Bedside report given.     Norma Lizarraga RN

## 2018-07-19 NOTE — IP AVS SNAPSHOT
[place] [address]    [place] [address]    Phone:  [phone]                                       After Visit Summary   7/19/2018    Angle Munguia    MRN: [id number]           After Visit Summary Signature Page     I have received my discharge instructions, and my questions have been answered. I have discussed any challenges I see with this plan with the nurse or doctor.    ..........................................................................................................................................  Patient/Patient Representative Signature      ..........................................................................................................................................  Patient Representative Print Name and Relationship to Patient    ..................................................               ................................................  Date                                            Time    ..........................................................................................................................................  Reviewed by Signature/Title    ...................................................              ..............................................  Date                                                            Time

## 2018-07-19 NOTE — IP AVS SNAPSHOT
MRN:[id number]                      After Visit Summary   7/19/2018    Angle Munguia    MRN: [id number]           Visit Information        Department      7/19/2018 12:28 PM [facility]          Review of your medicines      UNREVIEWED medicines. Ask your doctor about these medicines        Dose / Directions    ALLOPURINOL PO        Dose:  100 mg   Take 100 mg by mouth daily   Refills:  0       AMLODIPINE BESYLATE PO        Dose:  5 mg   Take 5 mg by mouth 2 times daily   Refills:  0       calcium acetate 667 MG Caps capsule   Commonly known as:  PHOSLO        Dose:  667 mg   Take 667 mg by mouth 3 times daily (with meals)   Refills:  0       calcium carbonate 500 MG tablet   Commonly known as:  OS-SHIRA 500 mg [place]. Ca        Dose:  500 mg   Take 500 mg by mouth 2 times daily   Refills:  0       cefdinir 300 MG capsule   Commonly known as:  OMNICEF   Indication:  HCAP   Used for:  Community acquired pneumonia        Dose:  300 mg   Take 1 capsule (300 mg) by mouth every 48 hours   Quantity:  5 capsule   Refills:  0       DIALYVITE Tabs        Take  by mouth.   Refills:  0       ISOSORBIDE MONONITRATE PO        Dose:  15 mg   Take 15 mg by mouth daily   Refills:  0       lactulose 10 GM/15ML solution   Commonly known as:  CHRONULAC        Dose:  10 g   Take 10 g by mouth as needed for constipation   Refills:  0       levETIRAcetam 500 MG tablet   Commonly known as:  KEPPRA        Dose:  500 mg   Take 1 tablet (500 mg) by mouth daily   Quantity:  30 tablet   Refills:  0       LISINOPRIL PO        Dose:  20 mg   Take 20 mg by mouth daily   Refills:  0       metoprolol succinate 25 MG 24 hr tablet   Commonly known as:  TOPROL-XL   Used for:  Atrial fibrillation (H)        Dose:  25 mg   Take 1 tablet (25 mg) by mouth daily   Quantity:  30 tablet   Refills:  0       nitroGLYcerin 0.4 MG sublingual tablet   Commonly known as:  NITROSTAT        Dose:  0.4 mg   Place 0.4 mg under the  tongue every 5 minutes as needed for chest pain For chest pain place 1 tablet under the tongue every 5 minutes for 3 doses. If symptoms persist 5 minutes after 1st dose call 911.   Refills:  0       OMEPRAZOLE PO        Dose:  20 mg   Take 20 mg by mouth every morning   Refills:  0       TYLENOL PO        Dose:  1000 mg   Take 1,000 mg by mouth every 8 hours as needed   Refills:  0       Urea 40 % Crea   Used for:  Xerosis cutis        Externally apply  topically 2 times daily. As directed.   Quantity:  180 g   Refills:  3       VITAMIN D3 PO        Dose:  400 Units   Take 400 Units by mouth daily   Refills:  0                Protect others around you: Learn how to safely use, store and throw away your medicines at www.disposemymeds.org.         Follow-ups after your visit         Care Instructions        Further instructions from your care team       Fistulagram Discharge Instructions     After you go home:      You may resume your normal diet    Have an adult stay with you for 6 hours if you received sedation       For 24 hours - due to the sedation you received:    Relax and take it easy    Do NOT make any important or legal decisions    Do NOT drive or operate machines at home or at work    Do NOT drink alcohol    Care of Puncture Site:      For the first 48 hrs, check your puncture site every couple hours while you are awake     You may remove/change the bandage tomorrow    You may shower tomorrow    No tub baths, whirlpools or swimming until your puncture site has fully healed    If puncture site starts to bleed - apply light pressure to site for 5 minutes or until bleeding stops     Activity       You should try to elevate your arm for the remainder of today to prevent increased swelling    You may go back to your normal activity in 24 hours     Wait 48 hours before lifting, straining, exercise or other strenuous activity    Medicines:      You may resume all your medications    For minor pain, you may take  "Acetaminophen (Tylenol) or Ibuprofen (Advil)                 Call the provider who ordered this procedure if:      Increased pain or a large or growing hard lump around the site    Blood or fluid is draining from the site    The site is red, swollen, hot or tender    Chills or a fever greater than 101 F (38 C)    Pain that is getting worse    Any questions or concerns      Call  911 or go to the Emergency Room if:    Severe pain or trouble breathing    Bleeding that you cannot control      If you have questions call:          [facility] Radiology Dept @ [phone]        The provider who performed your procedure was Dr Villar           Additional Information About Your Visit        [employer] Information     [employer] lets you send messages to your doctor, view your test results, renew your prescriptions, schedule appointments and more. To sign up, go to [email or web address] . Click on \"Log in\" on the left side of the screen, which will take you to the Welcome page. Then click on \"Sign up Now\" on the right side of the page.     You will be asked to enter the access code listed below, as well as some personal information. Please follow the directions to create your username and password.     Your access code is: [email or web address]  Expires: 2018  9:11 AM     Your access code will  in 90 days. If you need help or a new code, please call your [place] clinic or [phone].        Care EveryWhere ID     This is your Care EveryWhere ID. This could be used by other organizations to access your [facility] medical records  [id number]        Your Vitals Were     Blood Pressure Pulse Respirations Pulse Oximetry          117/61 79 16 100%         Primary Care Provider Office Phone # Fax #    La Nena Garcia [phone]      Equal Access to Services     MARYANN SIFUENTES AH: Kale Dozier, waaxda luqadaha, qaybta kaalmada avel, dayo reza. So pina " [phone].    ATENCIÓN: Si nick martin, tiene a nolan disposición servicios gratuitos de asistencia lingüística. Denny porter [phone].    We comply with applicable federal civil rights laws and [place] laws. We do not discriminate on the basis of race, color, national origin, age, disability, sex, sexual orientation, or gender identity.            Thank you!     Thank you for choosing [facility] for your care. Our goal is always to provide you with excellent care. Hearing back from our patients is one way we can continue to improve our services. Please take a few minutes to complete the written survey that you may receive in the mail after you visit with us. Thank you!             Medication List: This is a list of all your medications and when to take them. Check marks below indicate your daily home schedule. Keep this list as a reference.      Medications           Morning Afternoon Evening Bedtime As Needed    ALLOPURINOL PO   Take 100 mg by mouth daily                                AMLODIPINE BESYLATE PO   Take 5 mg by mouth 2 times daily                                calcium acetate 667 MG Caps capsule   Commonly known as:  PHOSLO   Take 667 mg by mouth 3 times daily (with meals)                                calcium carbonate 500 MG tablet   Commonly known as:  OS-SHIRA 500 mg [place]. Ca   Take 500 mg by mouth 2 times daily                                cefdinir 300 MG capsule   Commonly known as:  OMNICEF   Take 1 capsule (300 mg) by mouth every 48 hours                                DIALYVITE Tabs   Take  by mouth.                                ISOSORBIDE MONONITRATE PO   Take 15 mg by mouth daily                                lactulose 10 GM/15ML solution   Commonly known as:  CHRONULAC   Take 10 g by mouth as needed for constipation                                levETIRAcetam 500 MG tablet   Commonly known as:  KEPPRA   Take 1 tablet (500 mg) by mouth daily                                LISINOPRIL  PO   Take 20 mg by mouth daily                                metoprolol succinate 25 MG 24 hr tablet   Commonly known as:  TOPROL-XL   Take 1 tablet (25 mg) by mouth daily                                nitroGLYcerin 0.4 MG sublingual tablet   Commonly known as:  NITROSTAT   Place 0.4 mg under the tongue every 5 minutes as needed for chest pain For chest pain place 1 tablet under the tongue every 5 minutes for 3 doses. If symptoms persist 5 minutes after 1st dose call 911.                                OMEPRAZOLE PO   Take 20 mg by mouth every morning                                TYLENOL PO   Take 1,000 mg by mouth every 8 hours as needed                                Urea 40 % Crea   Externally apply  topically 2 times daily. As directed.                                VITAMIN D3 PO   Take 400 Units by mouth daily

## 2018-07-19 NOTE — PROGRESS NOTES
8378 pt has had many fisulograms, wife and  here. Wife answering questions. Pt drowsy. Reviewed discharge instructions with wife through  and she signed and copy given. Dialysis is due tomorrow. Iv started. h &p per IR given to packet. Nory rn to see pt now.

## 2018-07-19 NOTE — PROGRESS NOTES
Returned from IR per cart. Alert but sleepy. Awaiting next  to arrive. VSS. Stable (L) a/C dressing without hematoma or drainage. Denies complaints. Will observe for one hour per protocol.    1900 Discharged to home with site. Reviewed discharge instructions with  present. VSS. (L) arm site C/D/I.

## 2018-07-19 NOTE — DISCHARGE INSTRUCTIONS
Fistulagram Discharge Instructions     After you go home:      You may resume your normal diet    Have an adult stay with you for 6 hours if you received sedation       For 24 hours - due to the sedation you received:    Relax and take it easy    Do NOT make any important or legal decisions    Do NOT drive or operate machines at home or at work    Do NOT drink alcohol    Care of Puncture Site:      For the first 48 hrs, check your puncture site every couple hours while you are awake     You may remove/change the bandage tomorrow    You may shower tomorrow    No tub baths, whirlpools or swimming until your puncture site has fully healed    If puncture site starts to bleed - apply light pressure to site for 5 minutes or until bleeding stops     Activity       You should try to elevate your arm for the remainder of today to prevent increased swelling    You may go back to your normal activity in 24 hours     Wait 48 hours before lifting, straining, exercise or other strenuous activity    Medicines:      You may resume all your medications    For minor pain, you may take Acetaminophen (Tylenol) or Ibuprofen (Advil)                 Call the provider who ordered this procedure if:      Increased pain or a large or growing hard lump around the site    Blood or fluid is draining from the site    The site is red, swollen, hot or tender    Chills or a fever greater than 101 F (38 C)    Pain that is getting worse    Any questions or concerns      Call  911 or go to the Emergency Room if:    Severe pain or trouble breathing    Bleeding that you cannot control      If you have questions call:          [facility] @ [phone]        The provider who performed your procedure was Dr Villar

## 2018-07-19 NOTE — PROCEDURES
Procedure/Surgery Information   [facility]    Bedside Procedure Note  Date of Service (when I performed the procedure): 2018    Angle Munguia is a 83 year old male patient.  1. End stage renal disease (H)      Past Medical History:   Diagnosis Date     Chronic kidney disease      Dermatochalasis      Dialysis patient (H)      Dry eye syndrome      Gastric ulcer      Gastric ulcer      Glaucoma (increased eye pressure)      Glaucoma suspect      Gout      Hypertension      Nonsenile cataract      Retinal detachment     LE, now NLP      Seizures (H)      Type 2 diabetes mellitus without complications (H)          BP: 112/52   Heart Rate: 61 Resp: 9 SpO2: 98 % O2 Device: None (Room air)      Procedures  RADIOLOGY POST PROCEDURE NOTE w/ SEDATION  Patient name: Angle Munguia  MRN: [id number]  : 1935    Pre-procedure diagnosis: LUE swelling  Post-procedure diagnosis: Same    Procedure Date/Time: 2018  5:24 PM  Procedure: LUE venogram with 14 mm PTA of subclavian and innominate vein stenosis (within stent).  May return to HD as previously scheduled.  No apparent complications.  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  Estimated blood loss: 10 ml  Specimen(s) collected with description: None    I determined this patient to be an appropriate candidate for the planned sedation and procedure and reassessed the patient IMMEDIATELY PRIOR to sedation and procedure.     The patient tolerated the procedure well with no immediate complications.  Significant findings:  Please see above.    See imaging dictation for procedural details.    Provider name: Adonay Villar  Assistant(s):None               Adonay Villar

## 2018-09-14 NOTE — TELEPHONE ENCOUNTER
Apoorva from [facility] called on Wednesday.  Patient is scheduled for Fistulogram on 09/20/18 and she requested that Dr. Domingo see patient on that day while patient is here for fistulogram.  I explained that we do not typically do that and and Dr. Domingo is on vacation that day so she would need to set up a consult.  Patient is scheduled on 09/25/18 as they would like patient seen soon due to his multiple fistulograms (every 2 months for almost the last 6-8 months).  I told Apoorva that if it is possible that one of the surgeons can see patient in care suites, we will try to make it work but if not, patient is scheduled to see Dr. Ovalle on 09/25/18.  Dr. Ovalle aware of plan. AVF placed back in 01/13/08 by a Dr. Tinoco.  Pt has had a number of fistulograms over the years per chart review.  Mishel Nguyen,

## 2018-09-20 NOTE — TELEPHONE ENCOUNTER
Reviewed with Dr. Ovalle.  Pt needs to be scheduled for right upper extremity vein mapping.  Order entered.  Will route to scheduling to coordinate vein mapping prior to appt on 9/25/18 with Dr. Ovalle.   Edith Abebe, MAURAN, RN

## 2018-09-20 NOTE — PROCEDURES
RADIOLOGY POST PROCEDURE NOTE w/ SEDATION  Patient name: Angle Munguia  MRN: [id number]  : 1935    Pre-procedure diagnosis: LUE swelling  Post-procedure diagnosis: Same    Procedure Date/Time: 2018  3:02 PM  Procedure: LUE fistulagram.  Repeat 14 mm PTA of subclavian vein recurrent stenosis/intimal hyperplasia.  Decent result at completion.  No apparent complication.  US for vein mapping of RUE today.  Estimated blood loss: 5 ml  Specimen(s) collected with description: None    I determined this patient to be an appropriate candidate for the planned sedation and procedure and reassessed the patient IMMEDIATELY PRIOR to sedation and procedure.     The patient tolerated the procedure well with no immediate complications.  Significant findings:  Please see above.    See imaging dictation for procedural details.    Provider name: Adonay Villar  Assistant(s):None

## 2018-09-20 NOTE — DISCHARGE INSTRUCTIONS
Fistulagram Discharge Instructions     After you go home:      You may resume your normal diet    Have an adult stay with you for 6 hours if you received sedation       For 24 hours - due to the sedation you received:    Relax and take it easy    Do NOT make any important or legal decisions    Do NOT drive or operate machines at home or at work    Do NOT drink alcohol    Care of Puncture Site:      For the first 48 hrs, check your puncture site every couple hours while you are awake     You may remove/change the bandage tomorrow    You may shower tomorrow    No tub baths, whirlpools or swimming until your puncture site has fully healed    If puncture site starts to bleed - apply light pressure to site for 5 minutes or until bleeding stops     Activity       You should try to elevate your arm for the remainder of today to prevent increased swelling    You may go back to your normal activity in 24 hours     Wait 48 hours before lifting, straining, exercise or other strenuous activity    Medicines:      You may resume all your medications    For minor pain, you may take Acetaminophen (Tylenol) or Ibuprofen (Advil)                 Call the provider who ordered this procedure if:      Increased pain or a large or growing hard lump around the site    Blood or fluid is draining from the site    The site is red, swollen, hot or tender    Chills or a fever greater than 101 F (38 C)    Pain that is getting worse    Any questions or concerns      Call  911 or go to the Emergency Room if:    Severe pain or trouble breathing    Bleeding that you cannot control      If you have questions call:          [facility] Dept @ [phone]

## 2018-09-20 NOTE — IP AVS SNAPSHOT
[place] [address]    [place] [address]    Phone:  [phone]                                       After Visit Summary   9/20/2018    Angle Munguia    MRN: [id number]           After Visit Summary Signature Page     I have received my discharge instructions, and my questions have been answered. I have discussed any challenges I see with this plan with the nurse or doctor.    ..........................................................................................................................................  Patient/Patient Representative Signature      ..........................................................................................................................................  Patient Representative Print Name and Relationship to Patient    ..................................................               ................................................  Date                                   Time    ..........................................................................................................................................  Reviewed by Signature/Title    ...................................................              ..............................................  Date                                               Time          22EPIC Rev 08/18

## 2018-09-20 NOTE — PROGRESS NOTES
Interventional Radiology Pre-Procedure Sedation Assessment   Time of Assessment: 12:02 PM    Expected Level: Moderate Sedation    Indication: Sedation is required for the following type of Procedure: Left arm fistula fistulagram with possible angioplasty and/or stent placement    Sedation and procedural consent: Risks, benefits and alternatives were discussed with Patient, Spouse and Interpretor    PO Intake: Appropriately NPO for procedure    ASA Class: Class 2 - MILD SYSTEMIC DISEASE, NO ACUTE PROBLEMS, NO FUNCTIONAL LIMITATIONS.    Mallampati: Grade 2:  Soft palate, base of uvula, tonsillar pillars, and portion of posterior pharyngeal wall visible    Lungs: Lungs Clear with good breath sounds bilaterally, decreased bilaterally    Heart: Normal heart sounds and rate    History and physical reviewed and no updates needed. I have reviewed the lab findings, diagnostic data, medications, and the plan for sedation. I have determined this patient to be an appropriate candidate for the planned sedation and procedure and have reassessed the patient IMMEDIATELY PRIOR to sedation and procedure.    Jasmina Sierra, APRN CNP

## 2018-09-20 NOTE — PROGRESS NOTES
Patient had fistula bleed post fistulagram.  At appox 1pm IR staff notified and IR nurse here to hold manual pressure for 15 minutes.  Site was observed another 10 minutes after holding pressure- no re-bleed.  Patient was sent to US for vein mapping.  Patient DCD from care suites at 1530 and site remains CDI.  Left arm/hand is grossly swollen but radial pulse is present.  Patient plans to go to dialysis tomorrow.  Patient will follow-up with Dr. Ovalle on 9/25.

## 2018-09-20 NOTE — IP AVS SNAPSHOT
MRN:[id number]                      After Visit Summary   9/20/2018    Angle Munguia    MRN: [id number]           Visit Information        Department      9/20/2018 10:37 AM [facility]          Review of your medicines      UNREVIEWED medicines. Ask your doctor about these medicines        Dose / Directions    ALLOPURINOL PO        Dose:  100 mg   Take 100 mg by mouth daily   Refills:  0       AMLODIPINE BESYLATE PO        Dose:  5 mg   Take 5 mg by mouth 2 times daily   Refills:  0       calcium acetate 667 MG Caps capsule   Commonly known as:  PHOSLO        Dose:  667 mg   Take 667 mg by mouth 3 times daily (with meals)   Refills:  0       calcium carbonate 500 mg {elemental} 500 MG tablet   Commonly known as:  OS-SHIRA        Dose:  500 mg   Take 500 mg by mouth 2 times daily   Refills:  0       cefdinir 300 MG capsule   Commonly known as:  OMNICEF   Indication:  HCAP   Used for:  Community acquired pneumonia        Dose:  300 mg   Take 1 capsule (300 mg) by mouth every 48 hours   Quantity:  5 capsule   Refills:  0       DIALYVITE Tabs        Take  by mouth.   Refills:  0       ISOSORBIDE MONONITRATE PO        Dose:  15 mg   Take 15 mg by mouth daily   Refills:  0       lactulose 10 GM/15ML solution   Commonly known as:  CHRONULAC        Dose:  10 g   Take 10 g by mouth as needed for constipation   Refills:  0       levETIRAcetam 500 MG tablet   Commonly known as:  KEPPRA        Dose:  500 mg   Take 1 tablet (500 mg) by mouth daily   Quantity:  30 tablet   Refills:  0       LISINOPRIL PO        Dose:  20 mg   Take 20 mg by mouth daily   Refills:  0       metoprolol succinate 25 MG 24 hr tablet   Commonly known as:  TOPROL-XL   Used for:  Atrial fibrillation (H)        Dose:  25 mg   Take 1 tablet (25 mg) by mouth daily   Quantity:  30 tablet   Refills:  0       nitroGLYcerin 0.4 MG sublingual tablet   Commonly known as:  NITROSTAT        Dose:  0.4 mg   Place 0.4 mg under the  tongue every 5 minutes as needed for chest pain For chest pain place 1 tablet under the tongue every 5 minutes for 3 doses. If symptoms persist 5 minutes after 1st dose call 911.   Refills:  0       OMEPRAZOLE PO        Dose:  20 mg   Take 20 mg by mouth every morning   Refills:  0       TYLENOL PO        Dose:  1000 mg   Take 1,000 mg by mouth every 8 hours as needed   Refills:  0       Urea 40 % Crea   Used for:  Xerosis cutis        Externally apply  topically 2 times daily. As directed.   Quantity:  180 g   Refills:  3       VITAMIN D3 PO        Dose:  400 Units   Take 400 Units by mouth daily   Refills:  0                Protect others around you: Learn how to safely use, store and throw away your medicines at www.disposemymeds.org.         Follow-ups after your visit        Your next 10 appointments already scheduled     Sep 25, 2018 11:00 AM CDT   New Visit with Bassem Ovalle MD   [facility] ([facility])    [address]  [facility] [id number] [phone]               Care Instructions        Further instructions from your care team       Fistulagram Discharge Instructions     After you go home:      You may resume your normal diet    Have an adult stay with you for 6 hours if you received sedation       For 24 hours - due to the sedation you received:    Relax and take it easy    Do NOT make any important or legal decisions    Do NOT drive or operate machines at home or at work    Do NOT drink alcohol    Care of Puncture Site:      For the first 48 hrs, check your puncture site every couple hours while you are awake     You may remove/change the bandage tomorrow    You may shower tomorrow    No tub baths, whirlpools or swimming until your puncture site has fully healed    If puncture site starts to bleed - apply light pressure to site for 5 minutes or until bleeding stops     Activity       You should try to elevate  "your arm for the remainder of today to prevent increased swelling    You may go back to your normal activity in 24 hours     Wait 48 hours before lifting, straining, exercise or other strenuous activity    Medicines:      You may resume all your medications    For minor pain, you may take Acetaminophen (Tylenol) or Ibuprofen (Advil)                 Call the provider who ordered this procedure if:      Increased pain or a large or growing hard lump around the site    Blood or fluid is draining from the site    The site is red, swollen, hot or tender    Chills or a fever greater than 101 F (38 C)    Pain that is getting worse    Any questions or concerns      Call  911 or go to the Emergency Room if:    Severe pain or trouble breathing    Bleeding that you cannot control      If you have questions call:          [facility] Radiology Dept @ [phone]                  Additional Information About Your Visit        MyChart Information     [employer] lets you send messages to your doctor, view your test results, renew your prescriptions, schedule appointments and more. To sign up, go to [email or web address] . Click on \"Log in\" on the left side of the screen, which will take you to the Welcome page. Then click on \"Sign up Now\" on the right side of the page.     You will be asked to enter the access code listed below, as well as some personal information. Please follow the directions to create your username and password.     Your access code is: [email or web address]  Expires: 2018 11:23 AM     Your access code will  in 90 days. If you need help or a new code, please call your [place] clinic or [phone].        Care EveryWhere ID     This is your Care EveryWhere ID. This could be used by other organizations to access your [place] medical records  [id number]        Your Vitals Were     Blood Pressure Pulse Temperature Respirations Weight Pulse Oximetry    136/74 (BP Location: Right arm) 85 95.5  F (35.3 "  C) (Axillary) 16 54.4 kg (120 lb) 98%    BMI (Body Mass Index)                   21.95 kg/m2            Primary Care Provider Office Phone # Fax #    La Nena Garcia [phone]      Equal Access to Services     EDMOND SIFUENTES : Hadii aad ku hadbernabeo Soomaali, waaxda luqadaha, qaybta kaalmada adeegyada, dayo noeln daronabimael hernandez lamitzikarla juaquin. So [facility] [phone].    ATENCIÓN: Si habla español, tiene a nolan disposición servicios gratuitos de asistencia lingüística. Llame al [phone].    We comply with applicable federal civil rights laws and [place] laws. We do not discriminate on the basis of race, color, national origin, age, disability, sex, sexual orientation, or gender identity.            Thank you!     Thank you for choosing [facility] for your care. Our goal is always to provide you with excellent care. Hearing back from our patients is one way we can continue to improve our services. Please take a few minutes to complete the written survey that you may receive in the mail after you visit with us. Thank you!             Medication List: This is a list of all your medications and when to take them. Check marks below indicate your daily home schedule. Keep this list as a reference.      Medications           Morning Afternoon Evening Bedtime As Needed    ALLOPURINOL PO   Take 100 mg by mouth daily                                AMLODIPINE BESYLATE PO   Take 5 mg by mouth 2 times daily                                calcium acetate 667 MG Caps capsule   Commonly known as:  PHOSLO   Take 667 mg by mouth 3 times daily (with meals)                                calcium carbonate 500 mg {elemental} 500 MG tablet   Commonly known as:  OS-SHIRA   Take 500 mg by mouth 2 times daily                                cefdinir 300 MG capsule   Commonly known as:  OMNICEF   Take 1 capsule (300 mg) by mouth every 48 hours                                DIALYVITE Tabs   Take  by mouth.                                 ISOSORBIDE MONONITRATE PO   Take 15 mg by mouth daily                                lactulose 10 GM/15ML solution   Commonly known as:  CHRONULAC   Take 10 g by mouth as needed for constipation                                levETIRAcetam 500 MG tablet   Commonly known as:  KEPPRA   Take 1 tablet (500 mg) by mouth daily                                LISINOPRIL PO   Take 20 mg by mouth daily                                metoprolol succinate 25 MG 24 hr tablet   Commonly known as:  TOPROL-XL   Take 1 tablet (25 mg) by mouth daily                                nitroGLYcerin 0.4 MG sublingual tablet   Commonly known as:  NITROSTAT   Place 0.4 mg under the tongue every 5 minutes as needed for chest pain For chest pain place 1 tablet under the tongue every 5 minutes for 3 doses. If symptoms persist 5 minutes after 1st dose call 911.                                OMEPRAZOLE PO   Take 20 mg by mouth every morning                                TYLENOL PO   Take 1,000 mg by mouth every 8 hours as needed                                Urea 40 % Crea   Externally apply  topically 2 times daily. As directed.                                VITAMIN D3 PO   Take 400 Units by mouth daily

## 2018-09-25 NOTE — LETTER
[facility]  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 No other venous access available for AVF.  Best option may be a Hero graft.  Will discuss with my partners.  Discussed in detail with the patient and his family via . Will call them to schedule once a plan is in place.       Sincerely,    Bassem Ovalle MD    [facility]  [facility]  [place] [address]  Phone: [phone]  Fax: [phone]

## 2018-09-25 NOTE — MR AVS SNAPSHOT
"              After Visit Summary   2018    Angle Munguia    MRN: [id number]           Patient Information     Date Of Birth          1935        Visit Information        Provider Department      2018 11:00 AM Bassem Ovalle MD; LANGUAGE BANC [facility]      Today's Diagnoses     ESRD (end stage renal disease) on dialysis (H)    -  1       Follow-ups after your visit        Who to contact     If you have questions or need follow up information about today's clinic visit or your schedule please contact [facility] directly at [phone].  Normal or non-critical lab and imaging results will be communicated to you by MyChart, letter or phone within 4 business days after the clinic has received the results. If you do not hear from us within 7 days, please contact the clinic through MyChart or phone. If you have a critical or abnormal lab result, we will notify you by phone as soon as possible.  Submit refill requests through [employer] or call your pharmacy and they will forward the refill request to us. Please allow 3 business days for your refill to be completed.          Additional Information About Your Visit        MyChart Information     [employer] lets you send messages to your doctor, view your test results, renew your prescriptions, schedule appointments and more. To sign up, go to [email or web address] . Click on \"Log in\" on the left side of the screen, which will take you to the Welcome page. Then click on \"Sign up Now\" on the right side of the page.     You will be asked to enter the access code listed below, as well as some personal information. Please follow the directions to create your username and password.     Your access code is: [email or web address]  Expires: 2018 11:23 AM     Your access code will  in 90 days. If you need help or a new code, please call your [place] clinic or " [phone].        Care EveryWhere ID     This is your Care EveryWhere ID. This could be used by other organizations to access your [place] medical records  [id number]         Blood Pressure from Last 3 Encounters:   09/20/18 156/62   09/20/18 121/77   07/19/18 114/48    Weight from Last 3 Encounters:   09/20/18 120 lb (54.4 kg)   05/31/18 125 lb (56.7 kg)   08/31/17 130 lb 4.7 oz (59.1 kg)              Today, you had the following     No orders found for display         Today's Medication Changes          These changes are accurate as of 9/25/18  2:19 PM.  If you have any questions, ask your nurse or doctor.               These medicines have changed or have updated prescriptions.        Dose/Directions    metoprolol succinate 25 MG 24 hr tablet   Commonly known as:  TOPROL-XL   This may have changed:    - how much to take  - when to take this   Used for:  Atrial fibrillation (H)        Dose:  25 mg   Take 1 tablet (25 mg) by mouth daily   Quantity:  30 tablet   Refills:  0                Primary Care Provider Office Phone # Fax #    La Nena Garcia [phone]       Critical access hospital CARE [address] [facility] [address]        Equal Access to Services     EDMOND SIFUENTES AH: Hadii kady car hadasho Sojaneeali, waaxda luqadaha, qaybta kaalmada adeegyada, dayo reza. So [facility] [phone].    ATENCIÓN: Si habla español, tiene a nolan disposición servicios gratuitos de asistencia lingüística. Llame al [phone].    We comply with applicable federal civil rights laws and [place] laws. We do not discriminate on the basis of race, color, national origin, age, disability, sex, sexual orientation, or gender identity.            Thank you!     Thank you for choosing [facility] VASC[facility] [place]  for your care. Our goal is always to provide you with excellent care. Hearing back from our patients is one way we can continue to improve our services. Please  take a few minutes to complete the written survey that you may receive in the mail after your visit with us. Thank you!             Your Updated Medication List - Protect others around you: Learn how to safely use, store and throw away your medicines at www.disposemymeds.org.          This list is accurate as of 9/25/18  2:19 PM.  Always use your most recent med list.                   Brand Name Dispense Instructions for use Diagnosis    ALLOPURINOL PO      Take 100 mg by mouth daily        AMLODIPINE BESYLATE PO      Take 5 mg by mouth 2 times daily        calcium acetate 667 MG Caps capsule    PHOSLO     Take 667 mg by mouth 3 times daily (with meals)        calcium carbonate 500 mg {elemental} 500 MG tablet    OS-SHIRA     Take 500 mg by mouth 2 times daily        cefdinir 300 MG capsule    OMNICEF    5 capsule    Take 1 capsule (300 mg) by mouth every 48 hours    Community acquired pneumonia       DIALYVITE Tabs      Take  by mouth.        ISOSORBIDE MONONITRATE PO      Take 15 mg by mouth daily        lactulose 10 GM/15ML solution    CHRONULAC     Take 10 g by mouth as needed for constipation        levETIRAcetam 500 MG tablet    KEPPRA    30 tablet    Take 1 tablet (500 mg) by mouth daily        LISINOPRIL PO      Take 20 mg by mouth daily        metoprolol succinate 25 MG 24 hr tablet    TOPROL-XL    30 tablet    Take 1 tablet (25 mg) by mouth daily    Atrial fibrillation (H)       nitroGLYcerin 0.4 MG sublingual tablet    NITROSTAT     Place 0.4 mg under the tongue every 5 minutes as needed for chest pain For chest pain place 1 tablet under the tongue every 5 minutes for 3 doses. If symptoms persist 5 minutes after 1st dose call 911.        OMEPRAZOLE PO      Take 20 mg by mouth every morning        TYLENOL PO      Take 1,000 mg by mouth every 8 hours as needed        Urea 40 % Crea     180 g    Externally apply  topically 2 times daily. As directed.    Xerosis cutis       VITAMIN D3 PO      Take 400 Units by  mouth daily

## 2018-09-25 NOTE — PROGRESS NOTES
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 No other venous access available for AVF.  Best option may be a Hero graft.  Will discuss with my partners.  Discussed in detail with the patient and his family via . Will call them to schedule once a plan is in place.  Face to face time 30 minutes greater than 50% in consultation.                                                      No othwer

## 2018-10-29 NOTE — TELEPHONE ENCOUNTER
GEOVANNA with son, Amanda, asking him to call me to schedule Angle's surgery with Dr. Domingo. Mishel Nguyen,

## 2018-11-01 NOTE — TELEPHONE ENCOUNTER
Son, Amanda, called back and does not understand what the surgery is and has questions.  I will route a note to the nurse to discuss with son.  Amanda can be reached at [phone].  Patient is to be scheduled for Left upper extremity Hero graft with Instant Stick.  Mishel Nguyen,

## 2018-11-02 NOTE — TELEPHONE ENCOUNTER
Contacted pt's son, Amanda, to discuss questions and concerns he had regarding pt's proposed surgery.  Ya appreciated the phone call and stated he would discuss this with his family and pt and will call us back with a decision.  Will route to scheduling for update.  Encouraged Ya to call if further questions arise as well.  Edith Abebe, MAURAN, RN

## 2018-11-21 NOTE — OR NURSING
Patient Angle Munguia is in stable condition and has met criteria for PACU discharge.  [facility] was  informed and a sign out was given for Unit/Station transfer.

## 2018-11-21 NOTE — PROGRESS NOTES
Admission medication history interview status for the 11/21/2018  admission is complete. See EPIC admission navigator for prior to admission medications     Medication history source reliability:Poor    Medication history interview source(s):Son, Wife    Medication history resources (including written lists, pill bottles, clinic record):Spoke to son over the phone; Called [facility]    Primary pharmacy.[facility]    Additional medication history information not noted on PTA med list :  UPDATE :    Patient seems to be non-compliant. I called [facility], and all medications except Keppra were last filled Sept/Oct of 2017.  Pharmacist at [facility] seemed familiar with patient. Pharmacist himself stated that Angle is most likely noncompliant with most medications. But is likely taking Keppra as prescribed.       Time spent in this activity: 60 minutes    Prior to Admission medications    Medication Sig Last Dose Taking? Auth Provider   ALLOPURINOL PO Take 100 mg by mouth daily 11/20/2018 at Unknown time Yes Reported, Patient   AMLODIPINE BESYLATE PO Take 5 mg by mouth 2 times daily 11/20/2018 at Unknown time Yes Reported, Patient   B Complex-C-Folic Acid (DIALYVITE) TABS Take 1 tablet by mouth daily  11/19/2018 Yes Reported, Patient   calcium acetate (PHOSLO) 667 MG CAPS Take 667-1,334 mg by mouth daily  11/19/2018 Yes Unknown, Entered By History   Cholecalciferol (VITAMIN D3 PO) Take 400 Units by mouth daily  11/20/2018 Yes Reported, Patient   insulin glargine (LANTUS SOLOSTAR) 100 UNIT/ML pen Inject Subcutaneous At Bedtime Son states patient takes about 10 units 11/21/2018 at Unknown time Yes Reported, Patient   levETIRAcetam (KEPPRA) 500 MG tablet Take 1 tablet (500 mg) by mouth daily 11/21/2018 at 0800 Yes Enrrique Lozano MD   LISINOPRIL PO Take 20 mg by mouth daily 11/20/2018 at Unknown time Yes Reported, Patient   metoprolol (TOPROL-XL) 25 MG 24 hr tablet Take 1 tablet (25 mg) by mouth daily  Patient taking  differently: Take 50 mg by mouth 2 times daily  11/19/2018 Yes Cy Polanco MD   nitroglycerin (NITROSTAT) 0.4 MG sublingual tablet Place 0.4 mg under the tongue every 5 minutes as needed for chest pain For chest pain place 1 tablet under the tongue every 5 minutes for 3 doses. If symptoms persist 5 minutes after 1st dose call 911. more than a week at prn Yes Reported, Patient   OMEPRAZOLE PO Take 20 mg by mouth every morning 11/20/2018 at Unknown time Yes Unknown, Entered By History   Urea 40 % CREA Externally apply  topically 2 times daily. As directed.   Santi Haney MD

## 2018-11-21 NOTE — OR NURSING
PT'S WIFE IS UPSET THAT THE  WAS CANCELLED WITH NO REPLACEMENT.  SHE DOES NOT WANT AN  TO COME POST OP.  THE FAMILY PLANS TO HAVE EITHER THE PT'S SON OR DAUGHTER IN LAW HERE WITH PT. TO INTERPRET FOR PT. AND WIFE FOR THE REST OF THE TIME THAT THE PT. IS IN THE HOSPITAL. FAMILY UNDERSTANDS THAT PT. IS GOING TO STAY OVERNIGHT.     SERVICES WAS CALLED AND PUT ON SPEAKER PHONE TO START PRE-OP ASSESSMENT.  AFTER THE DAUGHTER IN LAW ARRIVED, WITH THE HELP OF THE  ON THE PHONE, THE PT. AND FAMILY WAIVED AN .    PT. TALKS VERY LITTLE, HE IS SOMEWHAT HARD OF HEARING BUT IF YOU SIT CLOSE AND SPEAK UP HE CAN HEAR AND UNDERSTAND. PT. STATES HE HAS NO PROBLEMS WITH HIS VISION.  DURING PRE-OP ASSESSMENT PT'S WIFE ANSWERED ALMOST ALL OF THE QUESTIONS AND SHE SIGNS HIS CONSENT FORMS FOR HIM.

## 2018-11-21 NOTE — DISCHARGE INSTRUCTIONS
Same Day Surgery Discharge Instructions for  Sedation and General Anesthesia       It's not unusual to feel dizzy, light-headed or faint for up to 24 hours after surgery or while taking pain medication.  If you have these symptoms: sit for a few minutes before standing and have someone assist you when you get up to walk or use the bathroom.      You should rest and relax for the next 24 hours. We recommend you make arrangements to have an adult stay with you for at least 24 hours after your discharge.  Avoid hazardous and strenuous activity.      DO NOT DRIVE any vehicle or operate mechanical equipment for 24 hours following the end of your surgery.  Even though you may feel normal, your reactions may be affected by the medication you have received.      Do not drink alcoholic beverages for 24 hours following surgery.       Slowly progress to your regular diet as you feel able. It's not unusual to feel nauseated and/or vomit after receiving anesthesia.  If you develop these symptoms, drink clear liquids (apple juice, ginger ale, broth, 7-up, etc. ) until you feel better.  If your nausea and vomiting persists for 24 hours, please notify your surgeon.        All narcotic pain medications, along with inactivity and anesthesia, can cause constipation. Drinking plenty of liquids and increasing fiber intake will help.      For any questions of a medical nature, call your surgeon.      Do not make important decisions for 24 hours.      If you had general anesthesia, you may have a sore throat for a couple of days related to the breathing tube used during surgery.  You may use Cepacol lozenges to help with this discomfort.  If it worsens or if you develop a fever, contact your surgeon.       If you feel your pain is not well managed with the pain medications prescribed by your surgeon, please contact your surgeon's office to let them know so they can address your concerns.       Call Dr. Domingo's office [phone] with any  questions or concerns.

## 2018-11-21 NOTE — ANESTHESIA PREPROCEDURE EVALUATION
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)     Chronic anemia     Stress-induced cardiomyopathy     Tachycardia     Tophaceous gout     CAP (community acquired pneumonia)     Type 2 diabetes with ophthalmic manifestation (H)     Myopia with astigmatism and presbyopia     Senile nuclear sclerosis     Blindness - Left Eye     Old retinal detachment, total or subtotal     Past Medical History:   Diagnosis Date     Anemia      Blind left eye      Chronic kidney disease      Dermatochalasis      Dialysis patient (H)      Dry eye syndrome      Gastric ulcer      Glaucoma (increased eye pressure)      Gout      Hyperparathyroidism (H)      Hypertension      Nonsenile cataract      Retinal detachment     LE, now NLP      Seizures (H)      Subdural hematoma (H)      Swelling of left upper extremity      Tachycardia      Type 2 diabetes mellitus without complications (H)      Past Surgical History:   Procedure Laterality Date     AV FISTULA OR GRAFT ARTERIAL      right arm     C PLACE CATH AV DIALYSIS SHUNT       CATARACT IOL, RT/LT Bilateral 2011     ESOPHAGOSCOPY, GASTROSCOPY, DUODENOSCOPY (EGD), COMBINED  1/30/2014    Procedure: COMBINED ESOPHAGOSCOPY, GASTROSCOPY, DUODENOSCOPY (EGD), BIOPSY SINGLE OR MULTIPLE;;  Surgeon: Ashlyn Friedman MD;  Location:  GI     ESOPHAGOSCOPY, GASTROSCOPY, DUODENOSCOPY (EGD), COMBINED  3/27/2014    Procedure: COMBINED ESOPHAGOSCOPY,  GASTROSCOPY, DUODENOSCOPY (EGD), BIOPSY SINGLE OR MULTIPLE;;  Surgeon: Ashlyn Friedman MD;  Location:  GI     UPPER GI ENDOSCOPY  3/27/14       No current facility-administered medications on file prior to encounter.   Current Outpatient Prescriptions on File Prior to Encounter:  ALLOPURINOL PO Take 100 mg by mouth daily   AMLODIPINE BESYLATE PO Take 5 mg by mouth 2 times daily   B Complex-C-Folic Acid (DIALYVITE) TABS Take  by mouth.   calcium acetate (PHOSLO) 667 MG CAPS Take 667 mg by mouth 3 times daily (with meals)   calcium carbonate (OS-SHIRA 500 MG [place]. [place]) 500 MG tablet Take 500 mg by mouth 2 times daily   cefdinir (OMNICEF) 300 MG capsule Take 1 capsule (300 mg) by mouth every 48 hours   Cholecalciferol (VITAMIN D3 PO) Take 400 Units by mouth daily    ISOSORBIDE MONONITRATE PO Take 15 mg by mouth daily   lactulose (CHRONULAC) 10 GM/15ML solution Take 10 g by mouth as needed for constipation   levETIRAcetam (KEPPRA) 500 MG tablet Take 1 tablet (500 mg) by mouth daily   LISINOPRIL PO Take 20 mg by mouth daily   metoprolol (TOPROL-XL) 25 MG 24 hr tablet Take 1 tablet (25 mg) by mouth daily (Patient taking differently: Take 50 mg by mouth 2 times daily )   nitroglycerin (NITROSTAT) 0.4 MG sublingual tablet Place 0.4 mg under the tongue every 5 minutes as needed for chest pain For chest pain place 1 tablet under the tongue every 5 minutes for 3 doses. If symptoms persist 5 minutes after 1st dose call 911.   OMEPRAZOLE PO Take 20 mg by mouth every morning   Urea 40 % CREA Externally apply  topically 2 times daily. As directed.     /76  Temp 37.2  C (99  F) (Oral)  Resp 16  Wt 62.8 kg (138 lb 8 oz)  SpO2 99%  BMI 25.33 kg/m2    Lab Results   Component Value Date    WBC 5.7 11/21/2018     Lab Results   Component Value Date    RBC 3.54 11/21/2018     Lab Results   Component Value Date    HGB 10.9 11/21/2018     Lab Results   Component Value Date    HCT 33.1 11/21/2018     Lab Results    Component Value Date    MCV 94 11/21/2018     Lab Results   Component Value Date    MCH 30.8 11/21/2018     Lab Results   Component Value Date    MCHC 32.9 11/21/2018     Lab Results   Component Value Date    RDW 15.3 11/21/2018     Lab Results   Component Value Date     11/21/2018     Lab Results   Component Value Date    INR 1.13 09/20/2018       Last Basic Metabolic Panel:  Lab Results   Component Value Date     11/14/2017      Lab Results   Component Value Date    POTASSIUM 4.6 11/14/2017     Lab Results   Component Value Date    CHLORIDE 92 11/14/2017     Lab Results   Component Value Date    SHIRA 8.7 11/14/2017     Lab Results   Component Value Date    CO2 32 11/14/2017     Lab Results   Component Value Date    BUN 36 11/14/2017     Lab Results   Component Value Date    CR 5.94 11/14/2017     Lab Results   Component Value Date    GLC 97 11/14/2017       EKG  31-MAR-2017 13:08:29 [facility]-ER-O ROUTINE RECORD  Atrial fibrillation  Left axis deviation  Prolonged QT  Abnormal ECG  Unconfirmed report - interpretation of this ECG is computer generated - see medical record for final interpretation    Echo 5/2014  Summary:   1. LV function is normal. The visually estimated ejection fraction is 55%.   2. Moderate concentric left ventricular hypertrophy.   3. Normal regional wall motion.   4. Pseudonormalization of diastolic filling consistent with diastolic dysfunction.   5. Moderately dilated left atrium.   6. Moderate tricuspid regurgitation.   7. Mild dilation of the proximal ascending aorta. It measures 4.07 cm.   8. Moderate dilation of the sinuses of Valsalva. It measures 4.5 cm.   9. Mildly elevated pulmonary pressure estimated at 34.4 mmHg plus right atrial pressure.  10. In direct comparison to the 2009 study the aortic dimensions have increased. No other significant changes have occurred.    Echo 6/2014  Interpretation Summary  Global and regional left ventricular function is  normal with an EF of 55-60%.   Global right ventricular function is normal. The inferior vena cava  cannot   be assessed. No pericardial effusion is present. A left pleural effusion is   present.  PatientHeight: 64 in  PatientWeight: 140 lbs  SystolicPressure: 145 mmHg  DiastolicPressure: 56 mmHg  HeartRate: 79 bpm  BSA 1.7 m^2        Procedure  Limited Portable Echo Adult.  Contrast Optison.     Left Ventricle  Global and regional left ventricular function is normal with an EF of 55-60%.     Right Ventricle  The right ventricle is normal size.  Global right ventricular function is normal.     Atria  Mild to moderate left atrial enlargement is present.     Mitral Valve  The mitral valve is normal.     Aortic Valve  Aortic valve is normal in structure and function.     Tricuspid Valve  The tricuspid valve is normal.  Trace tricuspid insufficiency is present.     Pulmonic Valve  The pulmonic valve is normal.     Vessels  The inferior vena cava  cannot be assessed.     Pericardium  No pericardial effusion is present.  Prominent epicardial fat is noted.     Miscellaneous  A left pleural effusion is present.    EKG - SR, first degree AVB, LAD, incomplete RBBB    Anesthesia Evaluation     . Pt has had prior anesthetic.            ROS/MED HX    ENT/Pulmonary: Comment: Blind left eye  Hx of retinal detachment     (-) sleep apnea   Neurologic: Comment: Hx of subdural hematoma    (+)seizures    (-) CVA and migraines   Cardiovascular: Comment: Stress induced cardiomyopathy    (+) hypertension----. : . . . :. dysrhythmias a-fib, .       METS/Exercise Tolerance:     Hematologic:     (+) Anemia, -      Musculoskeletal: Comment: gout        GI/Hepatic: Comment: Peptic ulcer disease - Gastric ulcer     (+) GERD       Renal/Genitourinary: Comment: Dialysis 3 times per week    Left upper extremity AV fistula with swelling of left upper extremity     (+) chronic renal disease, type: ESRD, Pt requires dialysis, type: Hemodialysis,        Endo: Comment: hyperphosphatemia   hyperparathryoidism     (+) type II DM .   (-) thyroid disease   Psychiatric:  - neg psychiatric ROS       Infectious Disease:         Malignancy:         Other:                     Physical Exam  Normal systems: cardiovascular and pulmonary    Airway   Mallampati: III  TM distance: >3 FB  Neck ROM: full    Dental   (+) missing  Comment: 2 teeth left    Cardiovascular   Rhythm and rate: regular and normal      Pulmonary    breath sounds clear to auscultation                    Anesthesia Plan      History & Physical Review  History and physical reviewed and following examination; no interval change.    ASA Status:  3 .    NPO Status:  > 8 hours    Plan for MAC Reason for MAC:  Deep or markedly invasive procedure (G8)  PONV prophylaxis:  Ondansetron (or other 5HT-3)  History obtained through       Postoperative Care  Postoperative pain management:  IV analgesics.      Consents  Anesthetic plan, risks, benefits and alternatives discussed with:  Patient and Spouse..                          .

## 2018-11-21 NOTE — ANESTHESIA CARE TRANSFER NOTE
Patient: Angle Munguia    Procedure(s):  LEFT UPPER EXTREMITY HERO GRAFT WITH INSTANT STICK, VENOGRAM, EXCISION OF AV FISTULA ANEURYSM    Diagnosis: ENDSTAGE RENAL DISEASE ; ON DYALISIS   Diagnosis Additional Information: No value filed.    Anesthesia Type:   MAC     Note:  Airway :Face Mask  Patient transferred to:PACU  Handoff Report: Identifed the Patient, Identified the Reponsible Provider, Reviewed the pertinent medical history, Discussed the surgical course, Reviewed Intra-OP anesthesia mangement and issues during anesthesia, Set expectations for post-procedure period and Allowed opportunity for questions and acknowledgement of understanding      Vitals: (Last set prior to Anesthesia Care Transfer)    CRNA VITALS  11/21/2018 1552 - 11/21/2018 1623      11/21/2018             Resp Rate (set): 10                Electronically Signed By: OTILIO An CRNA  November 21, 2018  4:23 PM

## 2018-11-21 NOTE — IP AVS SNAPSHOT
MRN:[id number]                      After Visit Summary   11/21/2018    Angle Hung    MRN: [id number]           Thank you!     Thank you for choosing [facility] for your care. Our goal is always to provide you with excellent care. Hearing back from our patients is one way we can continue to improve our services. Please take a few minutes to complete the written survey that you may receive in the mail after you visit with us. Thank you!        Patient Information     Date Of Birth          1935        Designated Caregiver       Most Recent Value    Caregiver    Will someone help with your care after discharge? yes    Name of designated caregiver SETH HUNG- WIFE OR CHILDREN    Phone number of caregiver AGA HUNG- DAUGHTER [place] - [phone]    Caregiver address [address] [place] [id number]      About your hospital stay     You were admitted on:  November 21, 2018 You last received care in the:  [facility] PACU    You were discharged on:  November 21, 2018       Who to Call     For medical emergencies, please call 911.  For non-urgent questions about your medical care, please call your primary care provider or clinic, [phone]  For questions related to your surgery, please call your surgery clinic        Attending Provider     Provider Specialty    Salazar Domingo MD Surgery       Primary Care Provider Office Phone # Fax #    [facility] Garcia [phone]      After Care Instructions     Activity       May shower in 2 days. May remove outer dressing in 2 days. Steri stripes will fall off on their own.  No heavy lifting with left arm.            Diet       Follow this diet upon discharge: Low saturated Fat, Low Cholesterol diet.            Discharge Instructions       Follow up with Dr. Domingo in 2 weeks. May take oxycodone as needed for pain                  Further instructions from your care team       Same Day Surgery Discharge Instructions  for  Sedation and General Anesthesia       It's not unusual to feel dizzy, light-headed or faint for up to 24 hours after surgery or while taking pain medication.  If you have these symptoms: sit for a few minutes before standing and have someone assist you when you get up to walk or use the bathroom.      You should rest and relax for the next 24 hours. We recommend you make arrangements to have an adult stay with you for at least 24 hours after your discharge.  Avoid hazardous and strenuous activity.      DO NOT DRIVE any vehicle or operate mechanical equipment for 24 hours following the end of your surgery.  Even though you may feel normal, your reactions may be affected by the medication you have received.      Do not drink alcoholic beverages for 24 hours following surgery.       Slowly progress to your regular diet as you feel able. It's not unusual to feel nauseated and/or vomit after receiving anesthesia.  If you develop these symptoms, drink clear liquids (apple juice, ginger ale, broth, 7-up, etc. ) until you feel better.  If your nausea and vomiting persists for 24 hours, please notify your surgeon.        All narcotic pain medications, along with inactivity and anesthesia, can cause constipation. Drinking plenty of liquids and increasing fiber intake will help.      For any questions of a medical nature, call your surgeon.      Do not make important decisions for 24 hours.      If you had general anesthesia, you may have a sore throat for a couple of days related to the breathing tube used during surgery.  You may use Cepacol lozenges to help with this discomfort.  If it worsens or if you develop a fever, contact your surgeon.       If you feel your pain is not well managed with the pain medications prescribed by your surgeon, please contact your surgeon's office to let them know so they can address your concerns.       Call Dr. Domingo's office [phone] with any questions or concerns.     Pending  "Results     Date and Time Order Name Status Description    2018 1644 XR Surgery [facility]ISH Fluoro L/T 5 Min w Stills In process     2018 1338 IR Hero Vascular Device Placement In process     2018 1030 EKG 12-lead, tracing only Preliminary             Admission Information     Date & Time Provider Department Dept. Phone    2018 Salazar Domingo MD [facility] PACU [phone]      Your Vitals Were     Blood Pressure Temperature Respirations Weight Pulse Oximetry BMI (Body Mass Index)    102/54 96.6  F (35.9  C) 18 62.8 kg (138 lb 8 oz) 96% 25.33 kg/m2      MyChart Information     [facility] lets you send messages to your doctor, view your test results, renew your prescriptions, schedule appointments and more. To sign up, go to [email or web address] . Click on \"Log in\" on the left side of the screen, which will take you to the Welcome page. Then click on \"Sign up Now\" on the right side of the page.     You will be asked to enter the access code listed below, as well as some personal information. Please follow the directions to create your username and password.     Your access code is: [email or web address]  Expires: 2018 10:23 AM     Your access code will  in 90 days. If you need help or a new code, please call your [place] clinic or [phone].        Care EveryWhere ID     This is your Care EveryWhere ID. This could be used by other organizations to access your [place] medical records  [id number]        Equal Access to Services     [facility] Hospital[facility]ATELYNN : Hadii aad ku hadasho Soomaali, waaxda luqadaha, qaybta kaalmada adejayda, dayo idiin hayaan adeeg kharash la'aan . So [facility] [phone].    ATENCIÓN: Si habla español, tiene a nolan disposición servicios gratuitos de asistencia lingüística. Llame al [phone].    We comply with applicable federal civil rights laws and [place] laws. We do not discriminate on the basis of race, color, national origin, age, disability, sex, sexual " orientation, or gender identity.               Review of your medicines      START taking        Dose / Directions    clopidogrel 75 MG tablet   Commonly known as:  PLAVIX   Used for:  End stage renal disease (H)        Dose:  75 mg   Take 1 tablet (75 mg) by mouth daily   Quantity:  30 tablet   Refills:  1       oxyCODONE 5 MG tablet   Commonly known as:  ROXICODONE   Used for:  End stage renal disease (H)        Dose:  5 mg   Take 1 tablet (5 mg) by mouth every 6 hours as needed for severe pain   Quantity:  10 tablet   Refills:  0         CONTINUE these medicines which may have CHANGED, or have new prescriptions. If we are uncertain of the size of tablets/capsules you have at home, strength may be listed as something that might have changed.        Dose / Directions    metoprolol succinate 25 MG 24 hr tablet   Commonly known as:  TOPROL-XL   This may have changed:    - how much to take  - when to take this   Used for:  Atrial fibrillation (H)        Dose:  25 mg   Take 1 tablet (25 mg) by mouth daily   Quantity:  30 tablet   Refills:  0         CONTINUE these medicines which have NOT CHANGED        Dose / Directions    ALLOPURINOL PO        Dose:  100 mg   Take 100 mg by mouth daily   Refills:  0       AMLODIPINE BESYLATE PO        Dose:  5 mg   Take 5 mg by mouth 2 times daily   Refills:  0       calcium acetate 667 MG Caps capsule   Commonly known as:  PHOSLO        Dose:  667-1334 mg   Take 667-1,334 mg by mouth daily   Refills:  0       * RENAL SOFTGELS PO        Dose:  1 tablet   Take 1 tablet by mouth daily   Refills:  0       * DIALYVITE Tabs        Dose:  1 tablet   Take 1 tablet by mouth daily   Refills:  0       insulin glargine 100 UNIT/ML injection   Commonly known as:  LANTUS        Inject Subcutaneous At Bedtime Son states patient takes about 10 units   Refills:  0       levETIRAcetam 500 MG tablet   Commonly known as:  KEPPRA        Dose:  500 mg   Take 1 tablet (500 mg) by mouth daily   Quantity:   30 tablet   Refills:  0       LISINOPRIL PO        Dose:  20 mg   Take 20 mg by mouth daily   Refills:  0       nitroGLYcerin 0.4 MG sublingual tablet   Commonly known as:  NITROSTAT        Dose:  0.4 mg   Place 0.4 mg under the tongue every 5 minutes as needed for chest pain For chest pain place 1 tablet under the tongue every 5 minutes for 3 doses. If symptoms persist 5 minutes after 1st dose call 911.   Refills:  0       OMEPRAZOLE PO        Dose:  20 mg   Take 20 mg by mouth every morning   Refills:  0       Urea 40 % Crea   Used for:  Xerosis cutis        Externally apply  topically 2 times daily. As directed.   Quantity:  180 g   Refills:  3       VITAMIN D3 PO        Dose:  400 Units   Take 400 Units by mouth daily   Refills:  0       * Notice:  This list has 2 medication(s) that are the same as other medications prescribed for you. Read the directions carefully, and ask your doctor or other care provider to review them with you.         Where to get your medicines      Some of these will need a paper prescription and others can be bought over the counter. Ask your nurse if you have questions.     Bring a paper prescription for each of these medications     clopidogrel 75 MG tablet    oxyCODONE 5 MG tablet                Protect others around you: Learn how to safely use, store and throw away your medicines at www.disposemymeds.org.        Information about OPIOIDS     PRESCRIPTION OPIOIDS: WHAT YOU NEED TO KNOW   We gave you an opioid (narcotic) pain medicine. It is important to manage your pain, but opioids are not always the best choice. You should first try all the other options your care team gave you. Take this medicine for as short a time (and as few doses) as possible.    Some activities can increase your pain, such as bandage changes or therapy sessions. It may help to take your pain medicine 30 to 60 minutes before these activities. Reduce your stress by getting enough sleep, working on hobbies you  enjoy and practicing relaxation or meditation. Talk to your care team about ways to manage your pain beyond prescription opioids.    These medicines have risks:    DO NOT drive when on new or higher doses of pain medicine. These medicines can affect your alertness and reaction times, and you could be arrested for driving under the influence (DUI). If you need to use opioids long-term, talk to your care team about driving.    DO NOT operate heavy machinery    DO NOT do any other dangerous activities while taking these medicines.    DO NOT drink any alcohol while taking these medicines.     If the opioid prescribed includes acetaminophen, DO NOT take with any other medicines that contain acetaminophen. Read all labels carefully. Look for the word  acetaminophen  or  Tylenol.  Ask your pharmacist if you have questions or are unsure.    You can get addicted to pain medicines, especially if you have a history of addiction (chemical, alcohol or substance dependence). Talk to your care team about ways to reduce this risk.    All opioids tend to cause constipation. Drink plenty of water and eat foods that have a lot of fiber, such as fruits, vegetables, prune juice, apple juice and high-fiber cereal. Take a laxative (Miralax, milk of magnesia, Colace, Senna) if you don t move your bowels at least every other day. Other side effects include upset stomach, sleepiness, dizziness, throwing up, tolerance (needing more of the medicine to have the same effect), physical dependence and slowed breathing.    Store your pills in a secure place, locked if possible. We will not replace any lost or stolen medicine. If you don t finish your medicine, please throw away (dispose) as directed by your pharmacist. The [place] [employer]ution [employer] has more information about safe disposal: https://www.pca.state.[place]/living-green/managing-unwanted-medications             Medication List: This is a list of all your medications and when to  take them. Check marks below indicate your daily home schedule. Keep this list as a reference.      Medications           Morning Afternoon Evening Bedtime As Needed    ALLOPURINOL PO   Take 100 mg by mouth daily                                AMLODIPINE BESYLATE PO   Take 5 mg by mouth 2 times daily                                calcium acetate 667 MG Caps capsule   Commonly known as:  PHOSLO   Take 667-1,334 mg by mouth daily                                clopidogrel 75 MG tablet   Commonly known as:  PLAVIX   Take 1 tablet (75 mg) by mouth daily                                * RENAL SOFTGELS PO   Take 1 tablet by mouth daily                                * DIALYVITE Tabs   Take 1 tablet by mouth daily                                insulin glargine 100 UNIT/ML injection   Commonly known as:  LANTUS   Inject Subcutaneous At Bedtime Son states patient takes about 10 units                                levETIRAcetam 500 MG tablet   Commonly known as:  KEPPRA   Take 1 tablet (500 mg) by mouth daily                                LISINOPRIL PO   Take 20 mg by mouth daily                                metoprolol succinate 25 MG 24 hr tablet   Commonly known as:  TOPROL-XL   Take 1 tablet (25 mg) by mouth daily                                nitroGLYcerin 0.4 MG sublingual tablet   Commonly known as:  NITROSTAT   Place 0.4 mg under the tongue every 5 minutes as needed for chest pain For chest pain place 1 tablet under the tongue every 5 minutes for 3 doses. If symptoms persist 5 minutes after 1st dose call 911.                                OMEPRAZOLE PO   Take 20 mg by mouth every morning                                oxyCODONE 5 MG tablet   Commonly known as:  ROXICODONE   Take 1 tablet (5 mg) by mouth every 6 hours as needed for severe pain                                Urea 40 % Crea   Externally apply  topically 2 times daily. As directed.                                VITAMIN D3 PO   Take 400 Units by  mouth daily                                * Notice:  This list has 2 medication(s) that are the same as other medications prescribed for you. Read the directions carefully, and ask your doctor or other care provider to review them with you.

## 2018-11-22 NOTE — ANESTHESIA POSTPROCEDURE EVALUATION
Patient: Angle Munguia    Procedure(s):  LEFT UPPER EXTREMITY HERO GRAFT WITH INSTANT STICK, VENOGRAM, EXCISION OF AV FISTULA ANEURYSM    Diagnosis:ENDSTAGE RENAL DISEASE ; ON DYALISIS   Diagnosis Additional Information: No value filed.    Anesthesia Type:  MAC    Note:  Anesthesia Post Evaluation    Patient location during evaluation: PACU  Patient participation: Able to fully participate in evaluation  Level of consciousness: awake and alert  Pain management: adequate  Airway patency: patent  Cardiovascular status: acceptable  Respiratory status: acceptable  Hydration status: acceptable  PONV: none     Anesthetic complications: None          Last vitals:  Vitals:    11/21/18 1710 11/21/18 1720 11/21/18 1728   BP: 111/61 102/54    Resp: 14 14 18   Temp: 35.9  C (96.7  F) 35.9  C (96.6  F)    SpO2: 98% 98% 96%         Electronically Signed By: Juan Manuel Bean MD  November 21, 2018  6:21 PM

## 2018-11-23 NOTE — OP NOTE
Procedure Date: 11/21/2018      PREOPERATIVE DIAGNOSES:   1.  Poorly functioning left upper arm transposition brachial to basilic arteriovenous fistula secondary to recurrent outflow stenosis.   2.  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      POSTOPERATIVE DIAGNOSES:     1.  Poorly functioning left upper arm transposition brachial to basilic arteriovenous fistula secondary to recurrent outflow stenosis.   2.  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      PROCEDURES:   1.  HeRO revision of failing left upper arm arteriovenous fistula with Acuseal [place] graft placement.    A.  Venogram.   B.  Excision of  original AVF and  Fistula aneurysm.      SURGEON:  Salazar Domingo MD.      FIRST ASSISTANT:     1.  Aramis Norman MD. (Vascular fellow).   2.  Adonay Kaufman MD, Interventional Radiology.      ANESTHESIA:  Local with intravenous supplementation.      MEDICATIONS:  Ancef 2 grams IV.      INDICATIONS:  An 83-year-old patient who is on chronic hemodialysis via left upper arm transposition brachial to basilic arteriovenous fistula.  He has had progressively increasing aneurysmal dilatation particularly at the antecubital area now measuring over 6 cm.  He has had a recurrent outflow stenosis and has undergone multiple stents which now cover his subclavian vein from lateral to the clavicle down to the thoracic inlet.  Recurrent stenosis has developed after multiple angioplasties.  We felt that an outflow revision with a HeRO graft and conversion of the fistula to a PTFE Acuseal graft would allow immediate use of the dialysis access and correct the outflow problems and allow us to excise the aneurysmal portion of the fistula.  Risks and benefits were discussed with the patient via his daughter who is his .      DESCRIPTION OF PROCEDURE:  The patient was brought to the operating room.  IV had been placed in his right arm.  Calf pneumatic compression  boots were used and pillows to both knees.  The entire right shoulder and axilla were prepped and draped in usual fashion.  A timeout was called and the sites were identified.      VASCULAR EXPOSURE:  A transverse incision was made in the distal upper arm before the antecubital area.  This allowed dissection down to the aneurysmal portion of the fistula.  We dissected this free from the surrounding tissue medially on the aneurysm itself.  We dissected down to identify the inflow brachial artery, which was encircled with Silastic vessel loop.  The outflow brachial artery was dissected free to allow the vascular clamp.  He had approximately a 1.5 cm neck of the fistula that was not significantly aneurysmal that we planned for our new arterial anastomosis.  We then dissected free of the large aneurysmal portion to the distal 1/3 of the upper arm via our transverse incision.  The segment that was remaining measured approximately 1 cm in diameter.  This went to another aneurysmal area which was much smaller at the mid upper arm that we decided not to treat at this time.      Access of central vein with venogram:  I then asked Dr. Kaufman of Interventional Radiology to come to the operating room.  We used duplex ultrasound to identify the subclavian vein just lateral to the previous stent.  This was confirmed by C-arm.  We then accessed the subclavian vein via a side branch that we had identified on a prior fistulogram and we were able to identify above the duplex.  This allowed a more gentle curve going down to the central circulation.  With the aid of the duplex followed by C-arm, we were able to pass the guidewires through this side branch into the subclavian vein.  Not unexpectedly, a new stenosis was appreciated within the stent approximately 1 cm from its origin most likely as it goes over the rib at the thoracic outlet.  Dr. Kaufman was able to angioplasty the segment with a 14 mm balloon as dictated in a  separate operative report.  4000 units of intravenous heparin were then given.  By venogram, the stenosis was significantly improved within the stent and there was no central stenosis noted.  We passed dilators under fluoroscopy over a wire followed by the HeRO catheter sheath.  HeRO catheter was prepared and then passed through our dilator down into the right atrium as confirmed by C-arm.  The wires and sheaths were then removed and the HeRO clamp was applied.      Placement of Acuseal PTFE graft:  An Acuseal graft was selected, so we could use this immediately post-procedure for dialysis and avoid a tunneled catheter.  We trimmed off the catheter going into the heart at the appropriate length in the shoulder region.  We slightly expanded our small incision to gain access.  Acuseal was connected to the universal connector and clicked 3 clicks for good security.  This was then hooked into the catheter with both barbs being well positioned.  Using a large Rillito tunneler, we created a subcutaneous tunnel over the mid biceps region from the antecubital incision up to her shoulder incision and brought our graft through this.  Appropriate tension was placed and we rechecked with the C-arm to make sure that the tip of the catheter was in the right atrium.  This easily was flushed with heparinized saline solution, applying the vascular clamp to the antecubital area.      Excision of the aneurysm:  We then ligated the mid upper arm portion of the fistula with a 0 silk suture.  Vessels were tightened around the inflow brachial artery and the vascular clamp distally.  We transected the aneurysm, which was markedly calcified.  We then transected this closer to the arterial anastomosis, leaving a cuff of approximately 1.5 cm that was relatively free disease and widely patent.  We oversewed the outflow portion of the fistula with a running 4-0 Prolene mattress suture followed by a running 4-0 Prolene suture and a 4-0 Prolene  stick tie to prevent backbleeding due to the calcified nature.  The Acuseal graft was then trimmed obliquely.  This was sewn in end-to-end fashion to the cuff of the original fistula vein with running 5-0 Prolene suture.  Release of the vessel loops and vascular clamp revealed a strong pulse within the Acuseal graft and good hemostasis.      Wounds were infiltrated with 0.5% Marcaine.  Subcutaneous tissue in the shoulder and antecubital incision were closed with interrupted 3-0 Vicryl in layers.  Skin was closed with 4-0 Monocryl in subcutaneous fashion, followed by Steri-Strips, gauze and Medipore tape.      The patient tolerated the procedure well.      ESTIMATED BLOOD LOSS:  100 mL.      COMPLICATIONS:  None.      The patient will be able to be discharged home with his family.  He will be placed on Plavix.  The dialysis unit will be instructed in the use of the HeRO Acuseal segment, which was marked on the patient's skin on his next dialysis run on 2018.         JACOB SALMERON MD             D: 2018   T: 2018   MT: JOCELYN      Name:     HARESH HUNG   MRN:      [id number]        Account:        [id number]   :      1935           Procedure Date: 2018      Document: [id number]       cc: [facility]

## 2018-12-26 PROBLEM — T82.9XXA DIALYSIS COMPLICATION: Status: ACTIVE | Noted: 2018-01-01

## 2018-12-26 NOTE — PROGRESS NOTES
Admission    Patient arrives to room 614-2 via cart from clinic  Care plan note: Pt alert, disoriented to situation, forgetful, Hmong speaking, minimal English, family here/supportive, helps to communicate, VSS on RA, denies pain, fair po, no void, LFA fistula bruit/thrill present, seen by Vascular/Nephrology, dialysis tomorrow, Hgb-6/7/6.5 tonight, transfuse if <6, tele-SR, bg 169, will monitor.    Inpatient nursing criteria listed below were met:    PCD's Documented: yes    Skin issues/needs documented :na  Isolation education started/completed na   Patient allergies verified with patient: yes  Verified completion of [place] Risk Assessment Tool:  Yes  Verified completion of Guardianship screening tool: yes  Fall Prevention: Care plan updated, Education given and documented yes  Care Plan initiated: yes  Home medications documented in belongings flowsheet: yes  Patient belongings documented in belongings flowsheet: yes  Reminder note (belongings/ medications) placed in discharge instructions:na    Admission profile/ required documentation complete: yes

## 2018-12-26 NOTE — CONSULTS
Vascular Surgery Consult  Patient is 84 y/o male who had left brachio-basilic AVF that had failed due to central stenosis, which had undergone multiple angioplasty and stent. He underwent HERO graft placement with Acuseal PTFE on 11/21/2018. Patient had a full run of dialysis on Friday. However, on Monday and today, the patient was unable to complete his dialysis run.   An ultrasound was brought to the bedside, the Acuseal graft and HERO graft was noted to be patent along the entire length of the arm.   We would recommend having patient try a dialysis run here tomorrow in the hospital. If there is further issues that he will need fistulogram through the graft.     STAFF: Examined patient with Dr Norman.  Surgical incisions are healing well.  He does have a palpable pulse in the graft closer to the elbow.  We performed a bedside duplex confirming patency of the Acuseal graft with good flow.  No seromas or other complications.  Discussed with Dr. Mcknight of nephrology.  They will try to use the access for dialysis in the hospital on 12/27/2018.       Salazar Domingo MD     Addendum: Patient had been placed on Plavix following his  HeRO graft placement which is a standard treatment due to the length of the catheter.  This had been discontinued for uncertain reasons.  He certainly can tolerate the medication thus we will start him again on chronic Plavix 75 mg daily.  I did discuss this with his nurse today on 12/27/2018     Salazar Domingo MD

## 2018-12-26 NOTE — H&P
[facility]    History and Physical  Hospitalist       Date of Admission:  12/26/2018  Date of Service (when I saw the patient): 12/26/18    Assessment & Plan   Angle Munguia is a 83 year old male who presents with malfunctioning dialysis site.  Admitted for further evaluation and treatment.    Malfunctioning dialysis fistula: Patient with report from dialysis clinic that pressures elevated to high during dialysis for completion, thus admitted to the hospital for vascular surgery to evaluate site.  -Vascular surgery consulted.  -Nephrology consulted.  -Defer further imaging and evaluation of dialysis access site to vascular.  -Monitor electrolyte.   -Patient may need dialysis catheter insertion if fistula is not able to be utilized.    Gout: Stable.  -Continue prior to admission allopurinol    Anemia, thrombocytopenia: Patient with hemoglobin of 6.7, and platelet count 128 on admission.  No reported bleeding.  -Urinalysis if able.  -Hemoccult.  -Conditional PRBC Transfusion for hemoglobin <6. Defer transfusion goals to Nephrology.     Cardiac, hypertension: Patient maintained on amlodipine, Plavix, metoprolol, and lisinopril prior to admission.  -Continue prior to admission amlodipine.  -Continue prior to admission lisinopril.  -Continue prior to admission metoprolol.    Diabetes mellitus: Patient maintained on 10 units of Lantus in the evening.  -Insulin sliding scale.  -Decreased Lantus dosing from 10 units to 5 units at bedtime.    GERD: Stable.  -Continue prior to admission omeprazole.    DVT Prophylaxis: Pneumatic Compression Devices  Code Status: Full Code    Disposition: Inpatient.    Dr. Phillip Pearson D.O.  [facility] Hospitalist  Pager [phone]    Primary Care Physician   La Nena Garcia    Chief Complaint   Malfunctioning dialysis fistula    History is obtained from the patient and medical records.     History of Present Illness   Angle Munguia is a 83 year old male who  presents with malfunctioning dialysis site.  Admitted for further evaluation and treatment. Patient with report from dialysis clinic that pressures elevated to high during dialysis for completion, thus admitted to the hospital for vascular surgery to evaluate site.  Patient denies chest pain, abdominal pain, nausea, vomiting, rash, blood in stool or urine.  Patient does endorse fluid retention, limited dialysis, weakness.    Past Medical History    I have reviewed this patient's medical history and updated it with pertinent information if needed.   Past Medical History:   Diagnosis Date     Anemia      Blind left eye      Chronic kidney disease      Dermatochalasis      Dialysis patient (H)      Dry eye syndrome      Gastric ulcer      Glaucoma (increased eye pressure)      Gout      Hyperparathyroidism (H)      Hypertension      Nonsenile cataract      Retinal detachment     LE, now NLP      Seizures (H)      Subdural hematoma (H)      Swelling of left upper extremity      Tachycardia      Type 2 diabetes mellitus without complications (H)        Past Surgical History   I have reviewed this patient's surgical history and updated it with pertinent information if needed.  Past Surgical History:   Procedure Laterality Date     AV FISTULA OR GRAFT ARTERIAL      right arm     C PLACE CATH AV DIALYSIS SHUNT       CATARACT IOL, RT/LT Bilateral 2011     ENDOVASCULAR PLACEMENT VASCULAR DEVICE Left 11/21/2018    Procedure: LEFT UPPER EXTREMITY HERO GRAFT WITH INSTANT STICK, VENOGRAM, EXCISION OF AV FISTULA ANEURYSM;  Surgeon: Salazar Domingo MD;  Location:  OR     ESOPHAGOSCOPY, GASTROSCOPY, DUODENOSCOPY (EGD), COMBINED  1/30/2014    Procedure: COMBINED ESOPHAGOSCOPY, GASTROSCOPY, DUODENOSCOPY (EGD), BIOPSY SINGLE OR MULTIPLE;;  Surgeon: Ashlyn Friedman MD;  Location:  GI     ESOPHAGOSCOPY, GASTROSCOPY, DUODENOSCOPY (EGD), COMBINED  3/27/2014    Procedure: COMBINED ESOPHAGOSCOPY, GASTROSCOPY,  DUODENOSCOPY (EGD), BIOPSY SINGLE OR MULTIPLE;;  Surgeon: Ashlyn Friedman MD;  Location:  GI     UPPER GI ENDOSCOPY  3/27/14       Prior to Admission Medications   Prior to Admission Medications   Prescriptions Last Dose Informant Patient Reported? Taking?   ALLOPURINOL PO  Son Yes No   Sig: Take 100 mg by mouth daily   AMLODIPINE BESYLATE PO  Son Yes No   Sig: Take 5 mg by mouth 2 times daily   B Complex-C-Folic Acid (DIALYVITE) TABS  Son Yes No   Sig: Take 1 tablet by mouth daily    B Complex-C-Folic Acid (RENAL SOFTGELS PO)   Yes No   Sig: Take 1 tablet by mouth daily   Cholecalciferol (VITAMIN D3 PO)  Son Yes No   Sig: Take 400 Units by mouth daily    LISINOPRIL PO  Son Yes No   Sig: Take 20 mg by mouth daily   OMEPRAZOLE PO  Son Yes No   Sig: Take 20 mg by mouth every morning   Urea 40 % CREA  Son No No   Sig: Externally apply  topically 2 times daily. As directed.   calcium acetate (PHOSLO) 667 MG CAPS  Son Yes No   Sig: Take 667-1,334 mg by mouth daily    clopidogrel (PLAVIX) 75 MG tablet   No No   Sig: Take 1 tablet (75 mg) by mouth daily   insulin glargine (LANTUS SOLOSTAR) 100 UNIT/ML pen  Son Yes No   Sig: Inject Subcutaneous At Bedtime Son states patient takes about 10 units   levETIRAcetam (KEPPRA) 500 MG tablet  Son No No   Sig: Take 1 tablet (500 mg) by mouth daily   metoprolol (TOPROL-XL) 25 MG 24 hr tablet  Son No No   Sig: Take 1 tablet (25 mg) by mouth daily   Patient taking differently: Take 50 mg by mouth 2 times daily    nitroglycerin (NITROSTAT) 0.4 MG sublingual tablet  Son Yes No   Sig: Place 0.4 mg under the tongue every 5 minutes as needed for chest pain For chest pain place 1 tablet under the tongue every 5 minutes for 3 doses. If symptoms persist 5 minutes after 1st dose call 911.   oxyCODONE (ROXICODONE) 5 MG tablet   No No   Sig: Take 1 tablet (5 mg) by mouth every 6 hours as needed for severe pain      Facility-Administered Medications: None     Allergies   No Known  Allergies    Social History   I have reviewed this patient's social history and updated it with pertinent information if needed. Angle Munguia  reports that  has never smoked. he has never used smokeless tobacco. He reports that he does not drink alcohol or use drugs.    Family History   I have reviewed this patient's family history and updated it with pertinent information if needed.   Family History   Problem Relation Age of Onset     Glaucoma No family hx of      Macular Degeneration No family hx of        Review of Systems   The 10 point Review of Systems is negative other than noted in the HPI or here.     Physical Exam                      Vital Signs with Ranges     0 lbs 0 oz    GENERAL: Alert. NAD. Conversational.   HEENT: Normocephalic. EOMI. No icterus or injection. Nares normal.   LUNGS: Clear to auscultation, minimal decrement to bases. No dyspnea at rest.   HEART: Regular rate. Extremities perfused.   ABDOMEN: Obese. Soft, nontender, and nondistended. Positive bowel sounds.   EXTREMITIES: Left upper extremity with dialysis AV fistula.   NEUROLOGIC: Moves extremities x4 on command. No acute focal neurologic abnormalities noted.     Data   Data reviewed today:  I personally reviewed both laboratory and imaging data.   No lab results found in last 7 days.    No results found for this or any previous visit (from the past 24 hour(s)).

## 2018-12-26 NOTE — PROGRESS NOTES
His HeRO graft is patent.  K 5.2.  I will plan on HD in AM with attempt to use AVG.    Murray Mcknight MD

## 2018-12-26 NOTE — PROGRESS NOTES
MD Notification    Notified Person: MD    Notified Person Name:DR Pearson    Notification Date/Time:1535    Notification Interaction:web paged    Purpose of Notification:Hgb-6.7    Orders Received:yes    Comments:

## 2018-12-27 NOTE — PLAN OF CARE
ENRIQUE fistula has faint bruit/thrill; Dialysis today was successful in using fistula.  Hgb 6.1, 1 unit packed cells transfused in dialysis.  Repeat Hgb this afternoon 8.0.  Denies pain.  Edema of LUE; elevated on pillows.  BG's 152, 101; good intake.  Drinking fluids.  No void.  No BM since Tuesday, senna-s given.   here this afternoon; family here this morning and now.

## 2018-12-27 NOTE — PROGRESS NOTES
Inpatient Dialysis Progress Note            Assessment and Plan:   1.  ESKD.  Stable run.  Access easily cannulated.  He is off of his MWF schedule.      2.  Anemia. HGB has fallen I suspect mainly due to large infiltrate x 2 in L arm.  He may need to have a unit of RBCs during his run today.      3.  HTN. BP OK on amlodipine 5 mg daily + metoprolol ER 25 mg daily + lisinopril 20 mg daily.    4.  FEN. K 5.5 on K2.    Plan:    Plan on running here again tomorrow, then discharge if all going well.  Transfuse today if hgb still < 7.    Addendum:    HGB 6.1.  I will order 1 unit RBCs to be given on the run.         Interval History:     AVG cannulated without any trouble using 16 ga needles.  HGB 6.7 to 6.5 last night.  He had HGB 8.5 on 12/8 as outpt in HD.        Dialysis Parameters:     Wt Readings from Last 4 Encounters:   12/27/18 64 kg (141 lb 1.5 oz)   11/21/18 62.8 kg (138 lb 8 oz)   09/20/18 54.4 kg (120 lb)   05/31/18 56.7 kg (125 lb)     No intake/output data recorded.  BP Readings from Last 3 Encounters:   12/27/18 (!) 135/114   11/21/18 116/60   09/20/18 156/62       Routine, ONE TIME, Starting today For 1 Occurrences  Weight Loss (kg): 2  Dialysis Temp: 36.5  C  Access Device: AVG  Access Site: L arm  Dialyzer: Revaclear  Dialysis Bath: K 2  Blood Flow Rate (mL/min): 315  Total Treatment Time (hrs): 3  Heparin: none         Medications and Allergies:   Reviewed in EPIC      - MEDICATION INSTRUCTIONS for Dialysis Patients -   Does not apply See Admin Instructions     allopurinol  100 mg Oral Daily     amLODIPine  5 mg Oral BID     calcium acetate  1,334 mg Oral TID w/meals     doxercalciferol  2 mcg Intravenous Once in dialysis     epoetin gretchen (EPOGEN,PROCRIT) inj ESRD  8,000 Units Intravenous Once     insulin aspart  1-7 Units Subcutaneous TID AC     insulin aspart  1-5 Units Subcutaneous At Bedtime     insulin glargine  5 Units Subcutaneous At Bedtime     levETIRAcetam  500 mg Oral Daily     lisinopril   20 mg Oral Daily     metoprolol succinate ER  25 mg Oral Daily     - MEDICATION INSTRUCTIONS -   Does not apply Once     omeprazole  20 mg Oral QAM     sodium chloride 0.9%, acetaminophen, acetaminophen, bisacodyl, glucose **OR** dextrose **OR** glucagon, labetalol, melatonin, naloxone, ondansetron **OR** ondansetron, polyethylene glycol, senna-docusate **OR** senna-docusate, - MEDICATION INSTRUCTIONS -   No Known Allergies           Labs:     BMP  Recent Labs   Lab 12/27/18  0840 12/26/18  1521   * 133   POTASSIUM 5.5* 5.2   CHLORIDE 96 97   SHIRA PENDING 8.2*   CO2 PENDING 22   BUN PENDING 84*   CR PENDING 12.40*   GLC PENDING 188*     CBC  Recent Labs   Lab 12/26/18  1852 12/26/18  1521   WBC  --  6.5   HGB 6.5* 6.7*   HCT  --  20.3*   MCV  --  89   PLT  --  128*     Lab Results   Component Value Date    AST 8 12/26/2018    ALT 10 12/26/2018    ALKPHOS 51 12/26/2018    BILITOTAL 0.7 12/26/2018    BILICONJ 0.0 06/19/2014    CRUZ 60 (H) 03/31/2017            Physical Exam:   Vitals were reviewed in [facility]    Wt Readings from Last 3 Encounters:   12/27/18 64 kg (141 lb 1.5 oz)   11/21/18 62.8 kg (138 lb 8 oz)   09/20/18 54.4 kg (120 lb)     No intake or output data in the 24 hours ending 12/27/18 0917    GENERAL APPEARANCE: pleasant, no distress, a & o  HEENT:  Eyes/ears/nose grossly normal, neck supple  RESP: lungs clear to auscultation with good efforts, no crackles, rhonchi or wheezes  CV: regular rate and rhythm, normal S1 S2, no murmur, click or rub   ABDOMEN: soft, nontender, bowel sounds normal  EXTREMITIES/SKIN:  L UE swollen       Pt seen on dialysis.  Stable run.  Good BFR.      Attestation:  I have reviewed today's vital signs, notes, medications, labs and imaging.     Murray Mcknight MD  [facility] - Nephrology  [phone]

## 2018-12-27 NOTE — PHARMACY-ADMISSION MEDICATION HISTORY
Admission medication history interview status for the 12/26/2018  admission is complete. See EPIC admission navigator for prior to admission medications     Medication history source reliability:Good    Actions taken by pharmacist (provider contacted, etc):sticky note.       Additional medication history information not noted on PTA med list :not taking plavix which was presciribed in November post hero cath placement.      Medication reconciliation/reorder completed by provider prior to medication history? No    Time spent in this activity: 25 min,  Patient does not know meds called his uncle marge [phone]    Prior to Admission medications    Medication Sig Last Dose Taking? Auth Provider   ALLOPURINOL PO Take 100 mg by mouth daily Past Month at Uses prn Yes Reported, Patient   AMLODIPINE BESYLATE PO Take 5 mg by mouth 2 times daily 12/26/2018 at x1 Yes Reported, Patient   B Complex-C-Folic Acid (DIALYVITE) TABS Take 1 tablet by mouth daily  12/26/2018 at Unknown time Yes Reported, Patient   calcium acetate (PHOSLO) 667 MG CAPS Take 1,334 mg by mouth 3 times daily (with meals)  12/26/2018 at Unknown time Yes Unknown, Entered By History   insulin glargine (LANTUS SOLOSTAR) 100 UNIT/ML pen Inject Subcutaneous At Bedtime Son states patient takes about 10 units 12/25/2018 at hs Yes Reported, Patient   levETIRAcetam (KEPPRA) 500 MG tablet Take 1 tablet (500 mg) by mouth daily 12/26/2018 at Unknown time Yes Enrrique Lozano MD   LISINOPRIL PO Take 20 mg by mouth daily 12/26/2018 at Unknown time Yes Reported, Patient   metoprolol (TOPROL-XL) 25 MG 24 hr tablet Take 1 tablet (25 mg) by mouth daily 12/26/2018 at Unknown time Yes Cy Polanco MD   OMEPRAZOLE PO Take 20 mg by mouth every morning 12/26/2018 at Unknown time Yes Unknown, Entered By History   Cholecalciferol (VITAMIN D3 PO) Take 400 Units by mouth daily    Reported, Patient   nitroglycerin (NITROSTAT) 0.4 MG sublingual tablet Place 0.4 mg  under the tongue every 5 minutes as needed for chest pain For chest pain place 1 tablet under the tongue every 5 minutes for 3 doses. If symptoms persist 5 minutes after 1st dose call 911. prn  Reported, Patient   Urea 40 % CREA Externally apply  topically 2 times daily. As directed. prkarla Haney, Santi Kay MD

## 2018-12-27 NOTE — PROVIDER NOTIFICATION
Critical lab text paged to Dr. Betts.  Hgb 6.1.  Note there are transfusion orders in for less than 6.

## 2018-12-27 NOTE — PROGRESS NOTES
[facility]    Hospitalist Progress Note    Assessment & Plan   Angle Munguia is a 83 year old male who presents with malfunctioning dialysis site.  Admitted for further evaluation and treatment.     Malfunctioning dialysis fistula: Patient with report from dialysis clinic that pressures elevated to high during dialysis for completion, thus admitted to the hospital. Vascular surgery saw her yesterday. Bedside US showed patent HERO and Acuseal PTFE were patent.  - Follow vasular surgery recs (trial of HD today, fistulogram if problems).      HD (MWF).  - Per nephrology who were consulted on admission.  - Monitor electrolytes.     Gout: Stable.  -Continue prior to admission allopurinol     Anemia, thrombocytopenia: Patient with hemoglobin of 6.7, and platelet count 128 on admission.  No reported bleeding. hgb fell to 6.1 this am, plt count is 126. No recent HGB is available at this time. Possibly due L arm infiltrate.  - Transfuse a unit of Prbc this am.  -Urinalysis if able.  - Hemoccult.  - peripheral smear, hemolytic panel.  - Follow pending w/u.  - Check HGB this evening.     Cardiac, hypertension: Patient maintained on amlodipine, Plavix, metoprolol, and lisinopril prior to admission.  -Continue prior to admission amlodipine.  -Continue prior to admission lisinopril.  -Continue prior to admission metoprolol.     Diabetes mellitus: Patient maintained on 10 units of Lantus in the evening.  -Insulin sliding scale.  -Cont Lantus at reduced dose.     GERD: Stable.  -Continue prior to admission omeprazole.     DVT Prophylaxis: Pneumatic Compression Devices    Disp: to be determined.      Interval History   Pt seen and examined. No specific complaint. Son/wife on speaker phome with pt and myself.    Physical Exam   Temp: 98  F (36.7  C) Temp src: Oral BP: (!) 135/114 Pulse: 98 Heart Rate: 96 Resp: 18 SpO2: 96 % O2 Device: None (Room air)    Vitals:    12/26/18 1500 12/27/18 0559   Weight: 63.9 kg (140 lb  14 oz) 64 kg (141 lb 1.5 oz)     Vital Signs with Ranges  Temp:  [97.5  F (36.4  C)-99  F (37.2  C)] 98  F (36.7  C)  Pulse:  [77-98] 98  Heart Rate:  [] 96  Resp:  [16-18] 18  BP: (135-170)/() 135/114  SpO2:  [87 %-97 %] 96 %  No intake/output data recorded.  Respiratory: Clear to auscultation bilaterally, no crackles or wheezing  Cardiovascular: Regular rate and rhythm, normal S1 and S2, and no murmur noted  GI: Normal bowel sounds, soft, non-distended, non-tender  Skin/Integumen: No rashes, no cyanosis, no edema  Other:     Medications     - MEDICATION INSTRUCTIONS -         - MEDICATION INSTRUCTIONS for Dialysis Patients -   Does not apply See Admin Instructions     allopurinol  100 mg Oral Daily     amLODIPine  5 mg Oral BID     calcium acetate  1,334 mg Oral TID w/meals     doxercalciferol  2 mcg Intravenous Once in dialysis     epoetin gretchen (EPOGEN,PROCRIT) inj ESRD  8,000 Units Intravenous Once     insulin aspart  1-7 Units Subcutaneous TID AC     insulin aspart  1-5 Units Subcutaneous At Bedtime     insulin glargine  5 Units Subcutaneous At Bedtime     levETIRAcetam  500 mg Oral Daily     lisinopril  20 mg Oral Daily     metoprolol succinate ER  25 mg Oral Daily     - MEDICATION INSTRUCTIONS -   Does not apply Once     omeprazole  20 mg Oral QAM       Data   Recent Labs   Lab 12/26/18  1852 12/26/18  1521   WBC  --  6.5   HGB 6.5* 6.7*   MCV  --  89   PLT  --  128*   NA  --  133   POTASSIUM  --  5.2   CHLORIDE  --  97   CO2  --  22   BUN  --  84*   CR  --  12.40*   ANIONGAP  --  14   SHIRA  --  8.2*   GLC  --  188*   ALBUMIN  --  3.6   PROTTOTAL  --  7.8   BILITOTAL  --  0.7   ALKPHOS  --  51   ALT  --  10   AST  --  8       No results found for this or any previous visit (from the past 24 hour(s)).

## 2018-12-27 NOTE — PLAN OF CARE
Pt alert, disoriented to situation, forgetful, Hmong speaking, minimal English, family here/supportive, helps to communicate, VSS except elevated BP, RA, c/o left arm pain at fistula site, prn tylenol given, fair po, no void, LFA fistula bruit/thrill present,dialysis today, Hgb-6.5 , transfuse if <6, tele- NSR, bg 195, 147 and 135, UA and stool sample needed,RN will continue to monitor.

## 2018-12-28 NOTE — PLAN OF CARE
Pt is A&O to self. Doesn't speak English well. Family assist with pt throughout shift and assist in communication. ENRIQUE fistula has bruit and thrill. VSS with elevated BP,Tele NSR. On RA with clear LS. Denies pain. Up with A-1 to wheelchair. Mod carb diet with good appetite. . No void. On HD. No BM. Hgb 7.6. Dialysis today. Nephrology and Vascular  following.

## 2018-12-28 NOTE — PROGRESS NOTES
Called son Amanda Munguia, notified him of plan for tunnel cath placement this afternoon planned for 4:30 pm.  Also  here, updated patient on plan.  NPO for procedure.

## 2018-12-28 NOTE — PROGRESS NOTES
US result noted.  Large hematoma. Possible cause of anemia.  Will hold off hemonc consult.  See vasc nallely note.  Monitor HGB.

## 2018-12-28 NOTE — PROGRESS NOTES
VASCULAR SURGERG    Angle Munguia has a left upper arm HeRO graft with an Accuseal PTFE component for instant use.  He has been able to use of sedatives dialysis unit until relieves this week.    Patient was admitted on 12/26/2018.  We saw him at the bedside he had a strong pulse at the antecubital portion of the graft where he does have some native autogenous old arteriovenous fistula.  We performed a bedside duplex then the HeRO was patent with no surrounding fluid or other complications.    Dialysis unit in hospital use the graft yesterday and reportedly there is mild very easily with no complications and a full dialysis run.    He was noted this morning that the left upper arm was quite swollen.  He underwent a left upper arm venous duplex to rule out a DVT.  This revealed an extensive hematoma from the axilla to the antecubital area measuring 22 x 5 x 5 cm consistent with extravasation by its location.    EXAM: Patient is comfortable and very tired.  Hemoglobin is dropped to 7.9 but was 6.1 upon admission on 12/27/2018.  Hematomas noted in the left upper arm over the biceps area.  He still has a strong pulse in the graft at the antecubital area with no significant swelling at this site.  No swelling in the anterior shoulder surgical site where the graft is connected to the catheter component.      K=5.5      Impression: Large left upper arm hematoma surrounding dialysis graft.  Most likely this is from extravasation though somewhat surprising with the Accuseal and the fact that should have been very well incorporated.  No evidence of disconnecting of the graft to the catheter portion the shoulder region.  Cannot completely rule out possibility of anastomotic leakage to the native fistula in the antecubital area although the hematoma seems to be a ways from this site.    With the large hematoma the graft is not usable at this time.  He was just started on Plavix yesterday and this will be held for now.  We will  decide whether we need to evacuate the hematoma in the next day or 2 to allow more rapid and eventual use of the graft.  He will need a temporary tunneled catheter placed for his dialysis.  I do not know if his right jugular vein is patent especially with the dialysis access being placed on the left side.  I have discussed the situation with Dr. Josselin Hoff of interventional radiology who will be placing the tunnel catheter.    Salazar Domingo MD

## 2018-12-28 NOTE — PROGRESS NOTES
[facility]    Hospitalist Progress Note    Assessment & Plan   Angle Munguia is a 83 year old male who presents with malfunctioning dialysis site.  Admitted for further evaluation and treatment.     Malfunctioning dialysis fistula: Patient with report from dialysis clinic that pressures elevated to high during dialysis for completion on Wednesday. , thus admitted to the hospital. Vascular surgery saw him. Bedside US showed patent HERO and Acuseal PTFE were patent. Tolerated dialysis yesterday.   - Going for HD again today.      HD (MWF).  - Per nephrology.  - Monitor electrolytes.     Gout: Stable.  -Continue prior to admission allopurinol     Anemia:  Patient with hemoglobin of 6.7--6.1. Transfused. 7.9 this am. Denies rectal bleed/melena/hematemesis. No recent HGB is available to me at this time. Possible etiologies include ESRD, R arm hematoma. ne hemolytic panel.   - Follow pending w/u including iron studies, peripheral smear.  - Monitor HGB and transfuse as needed.  - Hemonc input.     Cardiac, hypertension: Patient maintained on amlodipine, Plavix, metoprolol, and lisinopril prior to admission.  -Continue prior to admission amlodipine.  -Continue prior to admission lisinopril.  -Continue prior to admission metoprolol.     Diabetes mellitus: Patient maintained on 10 units of Lantus in the evening.  -Insulin sliding scale.  -Cont Lantus at reduced dose.     GERD: Stable.  -Continue prior to admission omeprazole.     DVT Prophylaxis: Pneumatic Compression Devices    Disp: 1-2 days  Pending hemonc input, hgb stability.      Interval History   Hx obtained with the help of [facility] . No specific complaint. Denies chest pain, sob, dizziness.    Physical Exam   Temp: 97.7  F (36.5  C) Temp src: Oral BP: 151/72 Pulse: 70 Heart Rate: 70 Resp: 18 SpO2: 96 % O2 Device: None (Room air)    Vitals:    12/26/18 1500 12/27/18 0559 12/28/18 0637   Weight: 63.9 kg (140 lb 14 oz) 64 kg (141 lb 1.5 oz)  62.4 kg (137 lb 9.1 oz)     Vital Signs with Ranges  Temp:  [97.7  F (36.5  C)-99.5  F (37.5  C)] 97.7  F (36.5  C)  Pulse:  [70-94] 70  Heart Rate:  [70-97] 70  Resp:  [18] 18  BP: (128-162)/(45-87) 151/72  SpO2:  [89 %-96 %] 96 %  I/O last 3 completed shifts:  In: 1140 [P.O.:740]  Out: 2250 [Other:2250]  Respiratory: Clear to auscultation bilaterally, no crackles or wheezing  Cardiovascular: Regular rate and rhythm, normal S1 and S2, and no murmur noted  GI: Normal bowel sounds, soft, non-distended, non-tender  Skin/Integumen: Right arm swelling (chronic).  Other:     Medications     - MEDICATION INSTRUCTIONS -         - MEDICATION INSTRUCTIONS for Dialysis Patients -   Does not apply See Admin Instructions     allopurinol  100 mg Oral Daily     amLODIPine  5 mg Oral BID     calcium acetate  1,334 mg Oral TID w/meals     clopidogrel  75 mg Oral Daily     insulin aspart  1-7 Units Subcutaneous TID AC     insulin aspart  1-5 Units Subcutaneous At Bedtime     insulin glargine  5 Units Subcutaneous At Bedtime     levETIRAcetam  500 mg Oral Daily     lisinopril  20 mg Oral Daily     metoprolol succinate ER  25 mg Oral Daily     - MEDICATION INSTRUCTIONS -   Does not apply Once     omeprazole  20 mg Oral QAM       Data   Recent Labs   Lab 12/28/18  0805 12/27/18 2005 12/27/18  1439 12/27/18  0840  12/26/18  1521   WBC 5.8  --  6.6 7.4  --  6.5   HGB 7.9* 7.6* 8.0* 6.1*   < > 6.7*   MCV 87  --  88 87  --  89   *  --  152 126*  --  128*   *  --   --  131*  --  133   POTASSIUM 5.5*  --   --  5.5*  --  5.2   CHLORIDE 96  --   --  96  --  97   CO2 23  --   --  19*  --  22   BUN 79*  --   --  91*  --  84*   CR 11.50*  --   --  13.80*  --  12.40*   ANIONGAP 12  --   --  16*  --  14   SHIRA 8.3*  --   --  7.2*  --  8.2*   *  --   --  186*  --  188*   ALBUMIN  --   --   --   --   --  3.6   PROTTOTAL  --   --   --   --   --  7.8   BILITOTAL  --   --   --   --   --  0.7   ALKPHOS  --   --   --   --   --  51    ALT  --   --   --   --   --  10   AST  --   --   --   --   --  8    < > = values in this interval not displayed.       No results found for this or any previous visit (from the past 24 hour(s)).

## 2018-12-28 NOTE — PROGRESS NOTES
IR NOTE:     Patient tolerated procedure well. New 14.5F double lumen tunneled CVC placed to right internal jugular for dialysis. Versed 0.5 mg and fentanyl 50 mcg given. Both lumens flushed with 3000 units heparin. Dressing clean, dry and intact. Patient taken to dialysis and report given to Ashlyn MALDONADO in dialysis.     Norma Lizarraga RN

## 2018-12-28 NOTE — PLAN OF CARE
OT: Patient having a procedure and dialysis this pm, needs an . Will schedule OT eval for tomorrow if appropriate.

## 2018-12-28 NOTE — PLAN OF CARE
Pt is A&O to self. Doesn't speak English well. Family assist with pt throughout shift and assist in communication. ENRIQUE fistula has faint bruit and thrill. VSS with elevated BP, scheduled med given with improvement. Tele NSR. On RA with clear LS. PRN Tylenol given x1 for ENRIQUE pain with relief. Up with A-1 to wheelchair. Mod carb diet with good appetite.  and 143. No void. On HD. No BM. R PIV SL. Hgb 7.6. Dialysis tomorrow. Nephrology and Vascular  following.

## 2018-12-28 NOTE — PROCEDURES
RADIOLOGY POST PROCEDURE NOTE w/ SEDATION  Patient name: Angle Mungiua  MRN: [id number]  : 1935    Pre-procedure diagnosis: hematoma around HERO graft.   Post-procedure diagnosis: Same    Procedure Date/Time: 2018  4:09 PM  Procedure: right internal jugular tunneled dialysis catheter placement.   Estimated blood loss: None  Specimen(s) collected with description: none    I determined this patient to be an appropriate candidate for the planned sedation and procedure and reassessed the patient IMMEDIATELY PRIOR to sedation and procedure.     The patient tolerated the procedure well with no immediate complications.  Significant findings:none    See imaging dictation for procedural details.    Provider name: Josselin Hoff  Assistant(s):None

## 2018-12-28 NOTE — PLAN OF CARE
Swelling of LUE 3+.  Ultrasound completed; large hematoma present.  Seen by Vascular Surgery.  Plan is for dialysis cath placement this afternoon; npo after lunch; then dialysis.  Up with assist.  Denies pain.  LUE elevated on pillows.  BG's 123, 178, Novolog sliding scale.  Tele SR, 1st degree AV block.  Room air sat 96%, denies chest pain.   present this morning and afternoon;  is scheduled for procedure; speaks very little english.  Confused, oriented to place and self.  Son Ya updated on plan.

## 2018-12-29 NOTE — PROGRESS NOTES
Inpatient Dialysis Progress Note            Assessment and Plan:   1.  ESKD.  New CVC is working well.  Stable run.    2.  Anemia. HGB 7.8.  ON EPO.    3.  HTN. BP is a little high but he has not had his antihypertensive yet.    4.  FEN. K 5.5.      Plan:    Next HD Monday.        Interval History:     S/P tunneled CVC as L UE needs to be rested in light of large hematoma.        Dialysis Parameters:     Wt Readings from Last 4 Encounters:   12/28/18 62.4 kg (137 lb 9.1 oz)   11/21/18 62.8 kg (138 lb 8 oz)   09/20/18 54.4 kg (120 lb)   05/31/18 56.7 kg (125 lb)     I/O last 3 completed shifts:  In: 720 [P.O.:720]  Out: -   BP Readings from Last 3 Encounters:   12/28/18 170/80   11/21/18 116/60   09/20/18 156/62       Routine, ONE TIME, Starting today For 1 Occurrences  Weight Loss (kg): 1  Dialysis Temp: 36.5  C  Access Device: CVC  Access Site: R IJ  Dialyzer: Revaclear  Dialysis Bath: K 3  Blood Flow Rate (mL/min): 400  Total Treatment Time (hrs): 3  Heparin: no         Medications and Allergies:   Reviewed in EPIC      - MEDICATION INSTRUCTIONS for Dialysis Patients -   Does not apply See Admin Instructions     allopurinol  100 mg Oral Daily     amLODIPine  5 mg Oral BID     calcium acetate  1,334 mg Oral TID w/meals     epoetin gretchen (EPOGEN,PROCRIT) inj ESRD  10,000 Units Intravenous Once     insulin aspart  1-7 Units Subcutaneous TID AC     insulin aspart  1-5 Units Subcutaneous At Bedtime     insulin glargine  5 Units Subcutaneous At Bedtime     levETIRAcetam  500 mg Oral Daily     lisinopril  20 mg Oral Daily     metoprolol succinate ER  25 mg Oral Daily     - MEDICATION INSTRUCTIONS -   Does not apply Once     - MEDICATION INSTRUCTIONS -   Does not apply Once     omeprazole  20 mg Oral QAM     sodium chloride 0.9%, acetaminophen, acetaminophen, bisacodyl, glucose **OR** dextrose **OR** glucagon, labetalol, lidocaine (buffered or not buffered), lidocaine (buffered or not buffered), melatonin, naloxone,  ondansetron **OR** ondansetron, polyethylene glycol, senna-docusate **OR** senna-docusate, - MEDICATION INSTRUCTIONS -, - MEDICATION INSTRUCTIONS -   No Known Allergies           Labs:     BMP  Recent Labs   Lab 12/28/18  0805 12/27/18  0840 12/26/18  1521   * 131* 133   POTASSIUM 5.5* 5.5* 5.2   CHLORIDE 96 96 97   SHIRA 8.3* 7.2* 8.2*   CO2 23 19* 22   BUN 79* 91* 84*   CR 11.50* 13.80* 12.40*   * 186* 188*     CBC  Recent Labs   Lab 12/28/18  1441 12/28/18  0805 12/27/18 2005 12/27/18  1439 12/27/18  0840  12/26/18  1521   WBC  --  5.8  --  6.6 7.4  --  6.5   HGB 7.8* 7.9* 7.6* 8.0* 6.1*   < > 6.7*   HCT  --  23.4*  --  23.7* 17.8*  --  20.3*   MCV  --  87  --  88 87  --  89   * 133*  --  152 126*  --  128*    < > = values in this interval not displayed.     Lab Results   Component Value Date    AST 8 12/26/2018    ALT 10 12/26/2018    ALKPHOS 51 12/26/2018    BILITOTAL 0.7 12/26/2018    BILICONJ 0.0 06/19/2014    CRUZ 60 (H) 03/31/2017            Physical Exam:   Vitals were reviewed in [facility]    Wt Readings from Last 3 Encounters:   12/28/18 62.4 kg (137 lb 9.1 oz)   11/21/18 62.8 kg (138 lb 8 oz)   09/20/18 54.4 kg (120 lb)       Intake/Output Summary (Last 24 hours) at 12/28/2018 1800  Last data filed at 12/28/2018 1200  Gross per 24 hour   Intake 100 ml   Output --   Net 100 ml       GENERAL APPEARANCE: pleasant, no distress, a & o  HEENT:  Eyes/ears/nose grossly normal, neck supple  RESP: lungs clear to auscultation with good efforts, no crackles, rhonchi or wheezes  CV: regular rate and rhythm, normal S1 S2, no murmur, click or rub   ABDOMEN: soft, nontender, bowel sounds normal  EXTREMITIES/SKIN: RUE 4+ swelling/hematoma      Pt seen on dialysis.  Stable run.  Good BFR.      Attestation:  I have reviewed today's vital signs, notes, medications, labs and imaging.     Murray Mcknight MD  [facility] - Nephrology  [phone]

## 2018-12-29 NOTE — PLAN OF CARE
([phone]): Patient is oriented to self/place. Hmong speaking, does understand some english and can communicate short sentences/responses to questions. Interpretor phone bedside. Lethargic. VSS on 1.5 LPM NC. Up w/1-2 GB/walker. Denies pain/nausea/SOB. Left arm +4 edema, elevating on pillow. CMS and pulse intact. Left arm fistula, no thrill/bruit. Right internal jugular dialysis cath WNL. Tele: NSR w/1 degree AVB. UA/Stool sample unable to be obtained, anuric and constipated, senna given during day. Fair appetite, BGM was 151. Continue IV ancef. Surgery tomorrow, NPO at midnight. Nursing will continue to monitor.

## 2018-12-29 NOTE — PROGRESS NOTES
Heme/onc:    Consult received earlier, and I went by to see patient, who was sleeping.  However, I see that the consult has been canceled this afternoon, so full consult will not be done.  Please let us know if our services are needed.

## 2018-12-29 NOTE — PLAN OF CARE
ENRIQUE and hand continues to be swollen, no change. CMS and pulse are good in L arm.  Patient does have pain with movement and touch; tylenol given; no pain at rest.  Plan is for surgery tomorrow, npo after midnight; family, wife and son aware.  Up in chair with assist of 2/walker/GB; BG's 127,126.  Good intake.  Confused to time and situation; knows he is in the hospital.  Tunnel cath to right chest, clean/dry/intact.  Hgb remains unchanged at 7.8.  Tele SR with 1st degree AVB.  Skin intact/dry.  Oxygen on at 1.5 lpm; wife states he does use at home.

## 2018-12-29 NOTE — PROGRESS NOTES
12/29/18 0736   Quick Adds   Type of Visit Initial Occupational Therapy Evaluation   Living Environment   Lives With child(david), adult;spouse   Living Arrangements house   Living Environment Comment Pt lives with his adult son and his wife, pt does not need to use stairs to enter house from backdoor, 2 steps to enter from front. Pt uses a stair lift and a WC on each level, has a tub/shower with a shower chair   Functional Level   Ambulation 2-->assistive person   Transferring 2-->assistive person   Toileting 2-->assistive person   Bathing 2-->assistive person   Dressing 2-->assistive person   Eating 0-->independent   Communication 0-->understands/communicates without difficulty   Swallowing 0-->swallows foods/liquids without difficulty   Prior Functional Level Comment Pt has assist of 2 typically for showers, per family report it sounds like home care is involved and there is an RN and possibly a PCA. Family manages meds, assists with all ADLs and manages all IADLs   General Information   Onset of Illness/Injury or Date of Surgery - Date 12/26/18   Referring Physician Beverley Betts MD   Patient/Family Goals Statement To return home   Additional Occupational Profile Info/Pertinent History of Current Problem Angle Munguia is a 83 year old male who presents with malfunctioning dialysis site.  Admitted for further evaluation and treatment.   Precautions/Limitations fall precautions;other (see comments)   General Observations Pt has new port, fistula on LUE/limb alert   Cognitive Status Examination   Orientation person;place   Follows Commands (Cognition) follows one step commands   Pain Assessment   Patient Currently in Pain Yes, see Vital Sign flowsheet   Integumentary/Edema   Integumentary/Edema Comments LUE edema present   Posture   Posture forward head position   Range of Motion (ROM)   ROM Comment LUE impaired due to swelling, pt unable to touch face or lift shoulder to 90 degrees,. Tremmor noted during  attempts.    Strength   Strength Comments LUE weakness 3/5   Hand Strength   Hand Strength Comments L hand weakness   Coordination   Upper Extremity Coordination Left UE impaired   Coordination Comments pt neglectful of L limb, is able to use slighly with VC and tactile cues but demonstrates redueced fine motor coordination   Mobility   Bed Mobility Comments MOD A    Transfer Skills   Transfer Comments A of 2   Transfer Skill: Bed to Chair/Chair to Bed   Level of [place]: Bed to Chair moderate assist (50% patients effort)   Physical Assist/Nonphysical Assist: Bed to Chair 2 persons   Assistive Device - Transfer Skill Bed to Chair Chair to Bed Rehab Eval rolling walker   Transfer Skill: Sit to Stand   Level of [place]: Sit/Stand minimum assist (75% patients effort)   Physical Assist/Nonphysical Assist: Sit/Stand 2 persons   Assistive Device for Transfer: Sit/Stand rolling walker   Upper Body Dressing   Level of [place]: Dress Upper Body minimum assist (75% patients effort)   Lower Body Dressing   Level of [place]: Dress Lower Body moderate assist (50% patients effort)   Toileting   Level of [place]: Toilet moderate assist (50% patients effort)   Grooming   Level of [place]: Grooming minimum assist (75% patients effort)   Eating/Self Feeding   Level of [place]: Eating stand-by assist   Activities of Daily Living Analysis   Impairments Contributing to Impaired Activities of Daily Living balance impaired;cognition impaired;coordination impaired;pain;strength decreased   General Therapy Interventions   Planned Therapy Interventions ADL retraining;cognition;ROM;progressive activity/exercise;home program guidelines   Clinical Impression   Criteria for Skilled Therapeutic Interventions Met yes, treatment indicated   OT Diagnosis Reduced IND in ADLs   Influenced by the following impairments balance impaired;cognition impaired;coordination impaired;pain;strength decreased   Assessment of  "Occupational Performance 5 or more Performance Deficits   Identified Performance Deficits dressing, bathing, bed mobility, transfers, toileting   Clinical Decision Making (Complexity) Moderate complexity   Therapy Frequency daily   Predicted Duration of Therapy Intervention (days/wks) 3 days   Anticipated Equipment Needs at Discharge bedside commode;tub bench   Anticipated Discharge Disposition Home with Assist;Home with Home Therapy   Risks and Benefits of Treatment have been explained. Yes   Patient, Family & other staff in agreement with plan of care Yes   [facility] TM \"6 Clicks\"   2016, Trustees of [facility], under license to [employer].  All rights reserved.   6 Clicks Short Forms Basic Mobility Inpatient Short Form;Daily Activity Inpatient Short Form   [facility]  \"6 Clicks\" Daily Activity Inpatient Short Form   1. Putting on and taking off regular lower body clothing? 3 - A Little   2. Bathing (including washing, rinsing, drying)? 3 - A Little   3. Toileting, which includes using toilet, bedpan or urinal? 3 - A Little   4. Putting on and taking off regular upper body clothing? 3 - A Little   5. Taking care of personal grooming such as brushing teeth? 3 - A Little   6. Eating meals? 3 - A Little   Daily Activity Raw Score (Score out of 24.Lower scores equate to lower levels of function) 18   Total Evaluation Time   Total Evaluation Time (Minutes) 10     "

## 2018-12-29 NOTE — PLAN OF CARE
ELIANA orientation, pt lethargic from procedure. At baseline, up w 1, but due to weakness unable to get out of bed. VSS on 2 l nc. Wife at bedside helping with some translation. No c/o pain overnight per wife. Rue and hand 4+ edema. US showed hematoma to fistula. ID following. Nursing will continue to monitor.

## 2018-12-29 NOTE — PLAN OF CARE
Patient lethargic after returning from tunnel cath placement and HD. Oriented to self, hmong speaking. Swelling of LUE 3+- elevated. US showing large hematoma. Up with assist of 1- did not get up this shift.  Denies pain. BG's 133, 176. No sliding scale given, scheduled Lantus given before bed. Temporary tunnel cath in place in right internal jugular, CDI. Tele SR, 1st degree AV block.  Hgb stable at 7.8.  Requiring 2 L O2 after procedure to maintain O2 stat above 90%. Mod carb/ renal diet- too lethargic for dinner. Still needs stool and urine sample. Son Amanda updated on plan- would like MD to call him in the AM if he is not present during rounds for an update on plan.

## 2018-12-29 NOTE — PROGRESS NOTES
Potassium   Date Value Ref Range Status   12/28/2018 5.5 (H) 3.4 - 5.3 mmol/L Final   ]  Lab Results   Component Value Date    HGB 7.8 12/28/2018     Weight: 62.4 kg (137 lb 9.1 oz)    POST WT 61.4kg    DIALYSIS PROCEDURE NOTE  Hepatitis status of previous patient on machine log was checked and ok to use with this patient hepatitis status.    Patient dialyzed for 3 hrs on a 3 K bath with a  net fluid removal of 1L.  A BFR of 400 ml/min was obtained via RIJ.  Patient was seen by Dr. Mcknight during treatment.      Total heparin received during treatment:: 0 units.    Heparin instilled in both ports post run.      Meds given: Epogen & Hectorol, see MAR     Complications: none       Procedure and ESRD teaching done and questions answered.  See flowsheet in EPIC for further details and post assessment.    Machine water alarm in place and functioning. CHLORINE AND CHLORAMINES CHECK on WATER SYSTEM EVERY 4 hours.     HEPATITIS B SURFACE ANTIGEN negative Date 5/1/17 HEPATITIS B SURFACE ANTIBODY immune DATE 3/5/18    PT returned via cart. LOC lethargic, calm, cooperative, denies pain, VSS  Catheter Access: Aseptic prep done for both on/off.  Report received from: NADIRA Price RN  Report given to: DEBI Paige, ERICA    Outpatient Dialysis at [facility]     Jennifer Tuttle, Dialysis RN

## 2018-12-29 NOTE — PROGRESS NOTES
VASCULAR SURGERY    Examined Angle Munguia in his room.  His wife is here who speaks some English.  Successful placement of right jugular tunneled catheter for dialysis yesterday.    Left arm remains swollen.  There is a pulse at the antecubital origin of the fistula.  Bedside duplex was performed revealing a large hematoma but now the HeRO graft is occluded.  Possibly due to compression from the large hematoma.      With these findings we will plan for a surgical evacuation hematoma and attempted embolectomy of the HeRO graft.  If this is not successful we would remove the graft and catheter going into the right ventricle due to the issues with potential clotting along the catheter.    This is been discussed with the patient's wife.  Surgery will be performed tomorrow morning under general anesthetic.  Even if were unsuccessful opening up the graft evacuation of large hematoma will help his arm swelling.       Hemoglobin= 7.8 this morning.     Salazar Domingo MD

## 2018-12-29 NOTE — PLAN OF CARE
Discharge Planner OT   Patient plan for discharge: Home with assist  Current status: Pt and family report he uses a WC at baseline, has stair lift (WC on both levels of house), and family assists for ADLs/IADLs and all transfers. Family reports he has memory difficulty at baseline but that they assist as needed and manage medications. Pt completed sit to stand to 2WW and marching in place with CGA-MIN A of 2 (second person SBA for safety). Pt has tremor and swelling in LUE, pt reports pain with movement. Pt is very pleasant but appears highly fatigued and required several cues to open eyes during activities, pt asked to be able to sleep.  Barriers to return to prior living situation: Pt may need bedside commode and tub transfer bench  Recommendations for discharge: Home with 24 hour assist for all mobility and ADLs/IADLs with Home OT, tub transfer bench and bedside commode.   Rationale for recommendations: Family strongly prefers discharge to home and reports they are comfortable with level of assist pt needs. Pt is below baseline and would benefit from OT services to maximize safety and IND in ADLs and to assist with caregiver training.        Entered by: Fallon Crawford 12/29/2018 10:40 AM

## 2018-12-29 NOTE — PROGRESS NOTES
[facility]    Hospitalist Progress Note    Assessment & Plan   Angle Munguia is a 83 year old male who presents with malfunctioning dialysis site.  Admitted for further evaluation and treatment.    Malfunctioning dialysis fistula: Patient with report from dialysis clinic that pressures elevated to high during dialysis for completion on Wednesday. , thus admitted to the hospital. Vascular surgery saw him. Bedside US showed patent HERO and Acuseal PTFE were patent. Tolerated dialysis on Thursday well. Central line has been inserted on 12/28 to give the right arm rest (hematoma)  - HD per nephrology.      HD (MWF).  - Per nephrology.  - Monitor electrolytes.    L arm hematoma (large). Likely due to extravasation. HGB is stable. Has good pulse. No pain with arm rest.  - Arm rest.  - Monitor HGB.  - Further plan per vasc surgery.    Acute on chronic anemia:  Patient with hemoglobin of 6.7--6.1. Transfused. 7.9--7.8--7.8 this am this am. Likely due to above. Denies rectal bleed/melena/hematemesis. No recent HGB is available to me at this time.    - Monitor HGB and transfuse as needed.     Gout: Stable.  -Continue prior to admission allopurinol        Cardiac, hypertension: Patient maintained on amlodipine, Plavix, metoprolol, and lisinopril prior to admission.  -Continue prior to admission amlodipine.  -Continue prior to admission lisinopril.  -Continue prior to admission metoprolol.     Diabetes mellitus: Patient maintained on 10 units of Lantus in the evening.  -Insulin sliding scale.  -Cont Lantus at reduced dose.     GERD: Stable.  -Continue prior to admission omeprazole.     DVT Prophylaxis: Pneumatic Compression Devices    Disp: TBD (will be here over the weekend)      Interval History   Hx obtained with the help of Cost Effective Data . No specific complaint. Denies chest pain, sob, dizziness. Right hand pain with movement but not with rest.    Physical Exam   Temp: 99.4  F (37.4  C) Temp src:  Axillary BP: 148/68 Pulse: 100 Heart Rate: 75 Resp: 24 SpO2: (!) 88 % O2 Device: Nasal cannula Oxygen Delivery: 1.5 LPM  Vitals:    12/27/18 0559 12/28/18 0637 12/29/18 0625   Weight: 64 kg (141 lb 1.5 oz) 62.4 kg (137 lb 9.1 oz) 65.4 kg (144 lb 2.9 oz)     Vital Signs with Ranges  Temp:  [97.7  F (36.5  C)-99.8  F (37.7  C)] 99.4  F (37.4  C)  Pulse:  [] 100  Heart Rate:  [69-92] 75  Resp:  [13-24] 24  BP: (142-182)/(52-85) 148/68  SpO2:  [87 %-100 %] 88 %  I/O last 3 completed shifts:  In: 100 [P.O.:100]  Out: 1000 [Other:1000]  Respiratory: Clear to auscultation bilaterally, no crackles or wheezing  Cardiovascular: Regular rate and rhythm, normal S1 and S2, and no murmur noted  GI: Normal bowel sounds, soft, non-distended, non-tender  Skin/Integumen: Right arm swelling.  Other:     Medications       - MEDICATION INSTRUCTIONS for Dialysis Patients -   Does not apply See Admin Instructions     allopurinol  100 mg Oral Daily     amLODIPine  5 mg Oral BID     calcium acetate  1,334 mg Oral TID w/meals     heparin Lock (1000 units/mL High concentration)  3 mL Intracatheter Once     heparin Lock (1000 units/mL High concentration)  3 mL Intracatheter Once     insulin aspart  1-7 Units Subcutaneous TID AC     insulin aspart  1-5 Units Subcutaneous At Bedtime     insulin glargine  5 Units Subcutaneous At Bedtime     levETIRAcetam  500 mg Oral Daily     lisinopril  20 mg Oral Daily     metoprolol succinate ER  25 mg Oral Daily     omeprazole  20 mg Oral QAM       Data   Recent Labs   Lab 12/28/18  1441 12/28/18  0805 12/27/18 2005 12/27/18  1439 12/27/18  0840  12/26/18  1521   WBC  --  5.8  --  6.6 7.4  --  6.5   HGB 7.8* 7.9* 7.6* 8.0* 6.1*   < > 6.7*   MCV  --  87  --  88 87  --  89   * 133*  --  152 126*  --  128*   INR 1.25*  --   --   --   --   --   --    NA  --  131*  --   --  131*  --  133   POTASSIUM  --  5.5*  --   --  5.5*  --  5.2   CHLORIDE  --  96  --   --  96  --  97   CO2  --  23  --   --  19*   --  22   BUN  --  79*  --   --  91*  --  84*   CR  --  11.50*  --   --  13.80*  --  12.40*   ANIONGAP  --  12  --   --  16*  --  14   SHIRA  --  8.3*  --   --  7.2*  --  8.2*   GLC  --  100*  --   --  186*  --  188*   ALBUMIN  --   --   --   --   --   --  3.6   PROTTOTAL  --   --   --   --   --   --  7.8   BILITOTAL  --   --   --   --   --   --  0.7   ALKPHOS  --   --   --   --   --   --  51   ALT  --   --   --   --   --   --  10   AST  --   --   --   --   --   --  8    < > = values in this interval not displayed.       Recent Results (from the past 24 hour(s))   US Upper Extremity Venous Duplex Left    Narrative    PROCEDURE:  Venous Doppler ultrasound of the left upper extremity    DATE OF PROCEDURE:  12/28/2018 10:56 AM    CLINICAL HISTORY/INDICATION:   swelling. Assess for fluid collection, DVT    COMPARISON:   None relevant    TECHNIQUE:   Grayscale, color-flow, and spectral waveform analysis were performed  of the deep veins of the left upper extremity    FINDINGS:   Large hematoma extending from the axilla to the antecubital fossa  along the length of the humoral component of graft.    The left jugular vein, cephalic vein radial vein and ulnar veins are  patent and free of thrombus. The axillary, subclavian, basilic and  brachial veins are not seen.      Impression    IMPRESSION:   1.  Extensive hematoma extending from the axilla to the antecubital  fossa.  2.  No venous thrombus within the visualized veins of the upper  extremity.    Results communicated with Dr. Domingo by Dr. Kaiser @ 12:11 am 12/28/18    ABHINAV KAISER MD   IR CVC Tunnel Placement > 5 Yrs of Age    Narrative    INTERVENTIONAL RADIOLOGY CENTRAL VENOUS CATHETER TUNNEL PLACEMENT  GREATER THAN YEARS OF AGE   12/28/2018 4:08 PM     CLINICAL HISTORY/INDICATION: Hematoma on left upper arm HeRO graft  making this unusable    PROCEDURES PERFORMED: Tunneled dialysis catheter    MEDICATIONS: 10 mL 1% lidocaine, 0.5 mg Versed IV, 50 mcg Fentanyl  IV  and Ancef 2 g IV    CONTRAST: None    FLUORO: 0.1 minutes    IMAGES/DOSE: 0.66 mGy    CONSENT: Following a discussion of the risks, benefits, indications  and alternatives to treatment, appropriate informed consent was  obtained.      TIMEOUT: A timeout was performed per universal protocol policy to  ensure correct patient, site, and procedure to be performed.     CENTRAL LINE STATEMENT: The patient was brought to the interventional  radiology suite and placed supine on the table. The patients right  neck and anterior chest region were prepped and draped in a sterile  fashion for tunneled line placement.  The procedure was performed  using maximal Sterile Barrier Technique Utilized: Cap AND mask AND  sterile gown AND sterile gloves AND sterile full body drape AND hand  hygiene AND skin preparation 2% chlorhexidine for cutaneous antisepsis  (or acceptable alternative antiseptics).  Sterile Ultrasound Technique  Utilized ?Sterile gel AND sterile probe covers.     PROCEDURE AND FINDINGS:   This central line was performed in accordance with the central line  bundle with the exception of vein selection. Following a discussion of  the risks, benefits, indications and alternatives to treatment,  appropriate informed consent was obtained.  The patient was brought to  the interventional radiology suite and placed supine on the table. The  patients right neck and anterior chest region were prepped and draped  in a sterile fashion for tunneled line placement.  A timeout was  performed per universal protocol policy to ensure correct patient,  site, and procedure to be performed.      Ultrasound was utilized to evaluate the veins of the right neck and a  permanent record of the image was obtained which demonstrates the  internal jugular vein to be patent. Under direct ultrasound guidance,  1% Lidocaine was infiltrated and access was gained easily into the low  lateral aspect of the internal jugular vein utilizing  micropuncture  technique. The wire was advanced easily under fluoroscopic guidance  and the tract was serially dilated and a peel away sheath placed. A  subcutaneous tunnel was then created over the anterior/superior chest  wall using local anesthesia and blunt dissection. Under fluoroscopic  guidance, a 14.5 Vincentian dual lumen 19 cm cuffed catheter was then  pulled through the tunnel and was advanced through the peel away  sheath. A final placement film demonstrates the catheter tip at the  cavoatrial junction with the patient supine.  All of the ports flush  and aspirate without difficulty following placement.  The catheter was  secured in position. The small incision at the base of the neck was  closed uneventfully.  A sterile dressing was applied.     Throughout the procedure, the patient was monitored by a radiology  nurse for cardiac rhythm which remained stable. The patient tolerated  the procedure well and left the interventional radiology suite in  stable condition.      Impression    IMPRESSION:  Uneventful placement of a right internal jugular vein 14.5 Vincentian dual  lumen 19 cm tunneled Palindrome catheter, as described using  ultrasound and fluoroscopic guidance. This catheter is indicated to be  use for hemodialysis or pheresis.    DHRUV DOW DO

## 2018-12-29 NOTE — PLAN OF CARE
Physical Therapy: Order received and chart reviewed.  Pt is an 83 year old male admitted for placement of new dialysis port. At baseline, patient is w/c bound and uses assistance for transfers into/out of wheelchair. Pt has home health RN and aides at baseline.    Discharge Planner PT   Patient plan for discharge: Home with family and home health aides  Current status: Per discussion with OT, patient is currently requiring assist with transfers and bed mobility. Plan is for OT to work on transfers and bed mobility with patient and family. Will defer acute care PT to avoid duplication of services in hospital, as patient is non-ambulatory at baseline. Will complete PT orders at this time.  Barriers to return to prior living situation: Decreased activity tolerance  Recommendations for discharge: Per OT, home with family and home health services.  Rationale for recommendations: Per OT, pt requires assistance for mobility at baseline, which family is currently providing and plans to provide at discharge.        Entered by: Dagmar Mancilla 12/29/2018 10:37 AM

## 2018-12-30 PROBLEM — S40.022A HEMATOMA OF ARM, LEFT, INITIAL ENCOUNTER: Status: ACTIVE | Noted: 2018-01-01

## 2018-12-30 NOTE — OR NURSING
not available in pre op.  Patient is to go ahead and proceed to OR per Dr. Domingo without an  present. Dr. Domingo states he spoke with patients wife about procedure yesterday.  I spoke with patients son Ya per phone while patient was in pre op  And patients name, birthday and procedure was confirmed.

## 2018-12-30 NOTE — BRIEF OP NOTE
[facility]    Brief Operative Note    Pre-operative diagnosis: Clotted Hero with hematoma  Post-operative diagnosis same  Procedure: Procedure(s):  LEFT ARM THROMBECTOMY, HERO GRAFT, EVACUATE HEMATOMA, HEROGRAM  Surgeon: Surgeon(s) and Role:     * Salazar Domingo MD - Primary     * Lejeune, Stacey, MD - Resident - Assisting  Anesthesia: General   Estimated blood loss: 50 mL  Drains: 19 Fr JAIR  Specimens: * No specimens in log *  Findings:   Large hematoma deep to graft evacuated ~500 ml  Venogram confirming patent inflow, anatstamosis and successful thrombectomy with #4 dahlia balloon.  Complications: none  Implants: None

## 2018-12-30 NOTE — PROGRESS NOTES
[facility]    Hospitalist Progress Note    Assessment & Plan   Angle Munguia is a 83 year old male who presents with malfunctioning dialysis site.  Admitted for further evaluation and treatment.    Malfunctioning dialysis fistula: Patient with report from dialysis clinic that pressures elevated to high during dialysis for completion on Wednesday. , thus admitted to the hospital. Vascular surgery saw him. Bedside US showed patent HERO and Acuseal PTFE were patent. Tolerated dialysis on Thursday well. Central line has been inserted on 12/28 to give the right arm rest (hematoma)  - HD per nephrology.      HD (MWF).  - Per nephrology.  - Monitor electrolytes.    L arm hematoma (large). Likely due to extravasation. HGB is stable. Has good pulse. No pain with arm rest.  - Arm rest.  - Monitor HGB.  - Further plan per vasc surgery (to OR today for evacuation of hematoma.).    Acute on chronic anemia:  Patient with hemoglobin of 6.7--6.1. Transfused. 7.9--7.8--7.8--7.6 this am. Likely due to above. Denies rectal bleed/melena/hematemesis. No recent HGB is available to me at this time.    - Monitor HGB and transfuse as needed.     Gout: Stable.  -Continue prior to admission allopurinol        Cardiac, hypertension: Patient maintained on amlodipine, Plavix, metoprolol, and lisinopril prior to admission.  -Continue prior to admission amlodipine.  -Continue prior to admission lisinopril.  -Continue prior to admission metoprolol.     Diabetes mellitus: Patient maintained on 10 units of Lantus in the evening.  -Insulin sliding scale.  -Cont Lantus at reduced dose.     GERD: Stable.  -Continue prior to admission omeprazole.     DVT Prophylaxis: Pneumatic Compression Devices    Disp: TBD (will be here over the weekend)      Interval History   No specific complaint. Denies chest pain, sob, dizziness. Left hand pain with movement but not with rest.    Physical Exam   Temp: 98.1  F (36.7  C) Temp src: Oral BP: 164/83    Heart Rate: 74 Resp: 20 SpO2: 96 % O2 Device: Nasal cannula Oxygen Delivery: 1 LPM  Vitals:    12/28/18 0637 12/29/18 0625 12/30/18 0615   Weight: 62.4 kg (137 lb 9.1 oz) 65.4 kg (144 lb 2.9 oz) 66.9 kg (147 lb 7.8 oz)     Vital Signs with Ranges  Temp:  [97.5  F (36.4  C)-98.2  F (36.8  C)] 98.1  F (36.7  C)  Heart Rate:  [69-74] 74  Resp:  [18-20] 20  BP: (127-164)/(56-83) 164/83  SpO2:  [86 %-97 %] 96 %  I/O last 3 completed shifts:  In: 220 [P.O.:220]  Out: 0   Respiratory: Clear to auscultation bilaterally, no crackles or wheezing  Cardiovascular: Regular rate and rhythm, normal S1 and S2, and no murmur noted  GI: Normal bowel sounds, soft, non-distended, non-tender  Skin/Integumen: Right arm swelling.  Other:     Medications       - MEDICATION INSTRUCTIONS for Dialysis Patients -   Does not apply See Admin Instructions     allopurinol  100 mg Oral Daily     amLODIPine  5 mg Oral BID     calcium acetate  1,334 mg Oral TID w/meals     ceFAZolin  1 g Intravenous See Admin Instructions     ceFAZolin  2 g Intravenous Pre-Op/Pre-procedure x 1 dose     heparin Lock (1000 units/mL High concentration)  3 mL Intracatheter Once     heparin Lock (1000 units/mL High concentration)  3 mL Intracatheter Once     insulin aspart  1-7 Units Subcutaneous TID AC     insulin aspart  1-5 Units Subcutaneous At Bedtime     insulin glargine  5 Units Subcutaneous At Bedtime     levETIRAcetam  500 mg Oral Daily     lisinopril  20 mg Oral Daily     metoprolol succinate ER  25 mg Oral Daily     omeprazole  20 mg Oral QAM       Data   Recent Labs   Lab 12/30/18  0055 12/29/18  0945 12/28/18  1441 12/28/18  0805  12/27/18  1439 12/27/18  0840  12/26/18  1521   WBC  --  5.0  --  5.8  --  6.6 7.4  --  6.5   HGB  --  7.8* 7.8* 7.9*   < > 8.0* 6.1*   < > 6.7*   MCV  --  90  --  87  --  88 87  --  89   PLT  --  152 135* 133*  --  152 126*  --  128*   INR  --   --  1.25*  --   --   --   --   --   --      --   --  131*  --   --  131*  --  133    POTASSIUM 5.4*  --   --  5.5*  --   --  5.5*  --  5.2   CHLORIDE 98  --   --  96  --   --  96  --  97   CO2 25  --   --  23  --   --  19*  --  22   BUN 46*  --   --  79*  --   --  91*  --  84*   CR 7.86*  --   --  11.50*  --   --  13.80*  --  12.40*   ANIONGAP 10  --   --  12  --   --  16*  --  14   SHIRA 8.5  --   --  8.3*  --   --  7.2*  --  8.2*   *  --   --  100*  --   --  186*  --  188*   ALBUMIN  --   --   --   --   --   --   --   --  3.6   PROTTOTAL  --   --   --   --   --   --   --   --  7.8   BILITOTAL  --   --   --   --   --   --   --   --  0.7   ALKPHOS  --   --   --   --   --   --   --   --  51   ALT  --   --   --   --   --   --   --   --  10   AST  --   --   --   --   --   --   --   --  8    < > = values in this interval not displayed.       No results found for this or any previous visit (from the past 24 hour(s)).

## 2018-12-30 NOTE — ANESTHESIA POSTPROCEDURE EVALUATION
Patient: Angle Munguia    Procedure(s):  LEFT ARM THROMBECTOMY, HERO GRAFT, EVACUATE HEMATOMA, HEROGRAM    Diagnosis:Clotted Hero with hematoma  Diagnosis Additional Information: No value filed.    Anesthesia Type:  General, ETT    Note:  Anesthesia Post Evaluation    Patient location during evaluation: PACU  Patient participation: Able to fully participate in evaluation  Level of consciousness: responsive to verbal stimuli  Pain management: adequate  Airway patency: patent  Cardiovascular status: acceptable  Respiratory status: acceptable  Hydration status: acceptable  PONV: none             Last vitals:  Vitals:    12/30/18 1220 12/30/18 1310 12/30/18 1353   BP:  135/62 126/63   Pulse:      Resp: 20  16   Temp: 35.8  C (96.4  F)  36.5  C (97.7  F)   SpO2: 96%           Electronically Signed By: Lm Noel MD  December 30, 2018  2:36 PM

## 2018-12-30 NOTE — PLAN OF CARE
4+ edema of LUE, good distal pulse, skin warm; no bruit/thrill of fistula sites; elevated on pillow for comfort; pain with touch/movement; tylenol and morning meds including BP meds given for pain at 0800 with sips of water.  Patient oriented to self and place.  Very little english.  Tunnel cath R upper chest.  Son updated on timing of surgery this morning; patient taken to pre-op.  Afebrile; VSS; continues on 1 lpm nasal cannula.

## 2018-12-30 NOTE — OP NOTE
Procedure Date: 12/30/2018      PREOPERATIVE DIAGNOSIS:  Thrombosed left upper arm HeRO graft, secondary to large upper arm hematoma following dialysis.      POSTOPERATIVE DIAGNOSIS:  Thrombosed left upper arm HeRO graft, secondary to large upper arm hematoma following dialysis.      PROCEDURES:   1.  Evacuation, right upper arm hematoma.   2.  Open thrombectomy, left upper arm HeRO graft via proximal one-third Acuseal PTFE segment.   3.  Intraoperative venograms.   4.  Repair of graft dialysis access bleeding sites.      SURGEON:  Salazar Domingo MD      ASSISTANT:  Dr. Stacey Lejeune (Vascular fellow)      ANESTHESIA:  General.      PREOPERATIVE MEDICATIONS:  Ancef 2 grams IV.      INDICATIONS:  An 83-year-old patient who was on hemodialysis via left upper arm HeRO graft.  We had placed an Acuseal PTFE component so they could use this immediately.  He has been dialyzing well for the last month but had difficulty, requiring admission.  He underwent a dialysis run in the hospital without difficulty, but the following day developed a large left upper arm hematoma.  This measured 25 x 5 x 5 cm.  We suspected that this may have been from a posterior opening in the bypass graft since the hematoma was located under the graft.  Unfortunately, this did occlude the HeRO graft.  The patient now has a tunneled catheter in his right internal jugular vein and has undergone appropriate dialysis and presents today for evacuation of hematoma causing a large amount of swelling in his left arm due to a subclavian vein occlusion and perhaps salvage of the HeRO graft if feasible.  Informed consent was obtained via the patient's family.      DESCRIPTION OF PROCEDURE:  The patient was brought to the operating room and induced under general anesthesia, orally intubated without difficulty.  Calf pneumatic compression boots were used, and a pillow was placed under the knees.  The entire left upper arm and chest were prepped and draped.   Timeout was called and the sites were identified.      Evacuation of hematoma:  A 5 cm incision was made over the distal 1/3 of the graft.  We dissected down to the graft, and at this location the graft looked very well incorporated.  We dissected medially and found under the fascia a large hematoma.  We opened up the fascia for several centimeters so we could evacuate the clots, which were all old with no active bleeding.  Approximately 500 mL of clot was removed.  This cavity was digitally opened to break down any septations.  This was irrigated with saline followed by Betadine saline, with no active bleeding being noted and good decompression.      Open thrombectomy HeRO graft:  We then dissected free the exposed portion of the PTFE graft which was very well adherent.  Heparinized saline solution was not used due to the recent bleeding.  A transverse graftotomy was made.  We passed a #4 Gabby up towards the connector in the shoulder and removed this without going into the catheter portion itself and removing organized thrombus.  We then passed this into the catheter portion, but not beyond the shoulder level, and removed some further thrombus, with good backbleeding being noted.  Heparinized saline solution was injected and a vascular clamp was applied.  We then used the Gabby to declot the arterial inflow, with no difficulty and excellent flow being appreciated.      Venogram:  C-arm was then used.  Using half straight contrast, we initially performed a venogram up the graft into the catheter portion in the atrium.  There was a small amount of residual thrombus in the graft just beyond the connector, and we used a Gabby with contrast to remove this.  Followup venogram revealed a widely patent graft and good flow into the right atrium.  We also performed a retrograde venogram into the arterial anastomosis, and this was widely patent also.  Heparinized saline solution was injected and vascular clamps were  applied.      We closed the opening in the Acuseal graft with interrupted 6-0 Prolene suture and loupe magnification.  With opening of the graft, we had a good strong pulse and good hemostasis at the graftotomy site.      Repair of bleeding access sites:  There were 2 areas posterior to the shoulder in our incision that were bleeding from needle hole punctures.  These were repaired easily with 6-0 Prolene mattress sutures.      Drainage of hematoma:  Due to the large size of the cavity, we knew there would be ongoing serosanguineous fluid.  A #19 round Sp drain was brought through a stab incision under the fascia and well medial to the graft.  This was placed into the cavity and secured with a 3-0 nylon suture at the skin edge.  We closed the opening in the fascia with running 3-0 Vicryl to isolate the hematoma cavity from the graft.      Wounds were infiltrated with 0.5% Marcaine.  Subcutaneous tissue over the graft was closed with interrupted 3-0 Vicryl, and the skin was closed with 4-0 Monocryl in subcuticular fashion along with several interrupted 3-0 nylon sutures.  Gauze, Kerlix rolls applied.      The patient tolerated the procedure well.  Estimated blood loss was 50 mL, and 500 mL of old clotted blood from the hematoma was evacuated.      The patient tolerated the procedure well.  He was extubated and returned to the recovery room in satisfactory condition.         JACOB SALMERON MD             D: 2018   T: 2018   MT: MERRITT      Name:     HARESH HUNG   MRN:      [id number]        Account:        [id number]   :      1935           Procedure Date: 2018      Document: [id number]

## 2018-12-30 NOTE — ANESTHESIA PREPROCEDURE EVALUATION
Anesthesia Pre-Procedure Evaluation    Patient: Angle Munguia   MRN: [id number] : 1935          Preoperative Diagnosis: Clotted Hero with hematoma    Procedure(s):  LEFT ARM THROMBECTOMY, DIALYSIS GRAFT, EVACUATE HEMATOMA    Past Medical History:   Diagnosis Date     Anemia      Blind left eye      Chronic kidney disease      Dermatochalasis      Dialysis patient (H)      Dry eye syndrome      Gastric ulcer      Glaucoma (increased eye pressure)      Gout      Hyperparathyroidism (H)      Hypertension      Nonsenile cataract      Retinal detachment     LE, now NLP      Seizures (H)      Subdural hematoma (H)      Swelling of left upper extremity      Tachycardia      Type 2 diabetes mellitus without complications (H)      Past Surgical History:   Procedure Laterality Date     AV FISTULA OR GRAFT ARTERIAL      right arm     C PLACE CATH AV DIALYSIS SHUNT       CATARACT IOL, RT/LT Bilateral      ENDOVASCULAR PLACEMENT VASCULAR DEVICE Left 2018    Procedure: LEFT UPPER EXTREMITY HERO GRAFT WITH INSTANT STICK, VENOGRAM, EXCISION OF AV FISTULA ANEURYSM;  Surgeon: Salazar Domingo MD;  Location:  OR     ESOPHAGOSCOPY, GASTROSCOPY, DUODENOSCOPY (EGD), COMBINED  2014    Procedure: COMBINED ESOPHAGOSCOPY, GASTROSCOPY, DUODENOSCOPY (EGD), BIOPSY SINGLE OR MULTIPLE;;  Surgeon: Ashlyn Friedman MD;  Location:  GI     ESOPHAGOSCOPY, GASTROSCOPY, DUODENOSCOPY (EGD), COMBINED  3/27/2014    Procedure: COMBINED ESOPHAGOSCOPY, GASTROSCOPY, DUODENOSCOPY (EGD), BIOPSY SINGLE OR MULTIPLE;;  Surgeon: Ashlyn Friedman MD;  Location:  GI     IR CVC TUNNEL PLACEMENT > 5 YRS OF AGE  2018     UPPER GI ENDOSCOPY  3/27/14      10:43:22 [facility] ROUTINE RECORD  Sinus rhythm with 1st degree A-V block  Left axis deviation  Incomplete right bundle branch block  Prolonged QT  Abnormal ECG    Anesthesia Evaluation     . Pt has had prior anesthetic.             ROS/MED HX    ENT/Pulmonary: Comment: Blind left eye  Hx of retinal detachment     (-) sleep apnea   Neurologic: Comment: Hx of subdural hematoma    (+)seizures    (-) CVA and migraines   Cardiovascular: Comment: Stress induced cardiomyopathy    (+) hypertension----. Taking blood thinners (plavix) : . . . :. dysrhythmias a-fib, .       METS/Exercise Tolerance:     Hematologic:     (+) Anemia (HGB on admission 6.7), Other Hematologic Disorder-thrombocytopenia      Musculoskeletal: Comment: gout        GI/Hepatic: Comment: Peptic ulcer disease - Gastric ulcer     (+) GERD       Renal/Genitourinary: Comment: Dialysis 3 times per week    Left upper extremity AV fistula with swelling of left upper extremity     Clotted HERO catheter with hematoma    Last dialysis - Thursday - 3 days ago    (+) chronic renal disease, type: ESRD, Pt requires dialysis, type: Hemodialysis,       Endo: Comment: hyperphosphatemia   hyperparathryoidism     (+) type II DM .   (-) thyroid disease   Psychiatric:  - neg psychiatric ROS       Infectious Disease:         Malignancy:         Other: Comment: Glaucoma  Blind left eye                         Physical Exam  Normal systems: pulmonary    Airway   Mallampati: III  TM distance: >3 FB  Neck ROM: full    Dental   (+) missing    Cardiovascular   Rhythm and rate: regular and normal      Pulmonary    breath sounds clear to auscultation            Lab Results   Component Value Date    WBC 6.5 12/30/2018    HGB 7.6 (L) 12/30/2018    HCT 22.7 (L) 12/30/2018     12/30/2018    CRP <3.0 08/17/2007    SED 6 08/17/2007     12/30/2018    POTASSIUM 5.0 12/30/2018    CHLORIDE 97 12/30/2018    CO2 27 12/30/2018    BUN 53 (H) 12/30/2018    CR 8.47 (H) 12/30/2018     (H) 12/30/2018    SHIRA 8.7 12/30/2018    PHOS 3.1 12/21/2015    MAG 2.0 06/23/2014    ALBUMIN 3.6 12/26/2018    PROTTOTAL 7.8 12/26/2018    ALT 10 12/26/2018    AST 8 12/26/2018    ALKPHOS 51 12/26/2018    BILITOTAL 0.7  "12/26/2018    LIPASE 110 06/18/2014    CRUZ 60 (H) 03/31/2017    PTT 43 (H) 12/30/2018    INR 1.24 (H) 12/30/2018    TSH 0.86 12/18/2015       Preop Vitals  BP Readings from Last 3 Encounters:   12/30/18 164/83   11/21/18 116/60   09/20/18 156/62    Pulse Readings from Last 3 Encounters:   12/28/18 100   09/20/18 85   07/19/18 79      Resp Readings from Last 3 Encounters:   12/30/18 20   11/21/18 18   09/20/18 16    SpO2 Readings from Last 3 Encounters:   12/30/18 96%   11/21/18 98%   09/20/18 100%      Temp Readings from Last 1 Encounters:   12/30/18 36.7  C (98.1  F) (Oral)    Ht Readings from Last 1 Encounters:   12/26/18 1.549 m (5' 1\")      Wt Readings from Last 1 Encounters:   12/30/18 66.9 kg (147 lb 7.8 oz)    Estimated body mass index is 27.87 kg/m  as calculated from the following:    Height as of this encounter: 1.549 m (5' 1\").    Weight as of this encounter: 66.9 kg (147 lb 7.8 oz).       Anesthesia Plan      History & Physical Review  History and physical reviewed and following examination; no interval change.    ASA Status:  3 .    NPO Status:  > 8 hours    Plan for General and ETT with Propofol induction. Maintenance will be Balanced.    PONV prophylaxis:  Ondansetron (or other 5HT-3)  Patient speaks limited English.  Preop nurse spoke with son via cell phone to confirm procedure, date of last dialysis      Postoperative Care  Postoperative pain management:  IV analgesics.      Consents  Anesthetic plan, risks, benefits and alternatives discussed with:  Patient and Implied consent/emergency.  Use of blood products discussed: Yes.   Use of blood products discussed with Patient.  Consented to blood products.  .                 Lm Noel MD  "

## 2018-12-30 NOTE — ANESTHESIA CARE TRANSFER NOTE
Patient: Angle Munguia    Procedure(s):  LEFT ARM THROMBECTOMY, HERO GRAFT, EVACUATE HEMATOMA, HEROGRAM    Diagnosis: Clotted Hero with hematoma  Diagnosis Additional Information: No value filed.    Anesthesia Type:   General, ETT     Note:  Airway :Nasal Cannula  Patient transferred to:PACU  Handoff Report: Identifed the Patient, Identified the Reponsible Provider, Reviewed the pertinent medical history, Discussed the surgical course, Reviewed Intra-OP anesthesia mangement and issues during anesthesia, Set expectations for post-procedure period and Allowed opportunity for questions and acknowledgement of understanding      Vitals: (Last set prior to Anesthesia Care Transfer)    CRNA VITALS  12/30/2018 1148 - 12/30/2018 1223      12/30/2018             Resp Rate (set):  10                Electronically Signed By: OTILIO Spring CRNA  December 30, 2018  12:23 PM

## 2018-12-30 NOTE — PLAN OF CARE
Very tired overnight, disoriented to time and situation. Speaks Hmong. VSS on 1.5L NC. Denies pain. LUE +4 edema, good pulses. L arm fistula, thrill and bruit absent. R chest dialysis cathter, CDI. Up with 2 assist. TELE SR with 1st degree block. Bgm checks. Needs UA and stool sample. NPO @ 0000. Plan for L arm hematoma evacuation and L arm fistula embolectomy/revision 12-30.

## 2018-12-31 NOTE — PROGRESS NOTES
Assessment and Plan:   ESRD: R CVC, 400 BFR, 3h, 2L UF. 2K and 35 HCO3. On EPO and hectorol. No heparin.   On phoslo.             Interval History:   Anemia: EPO 10,000 U IV each run. Hgb 6.4.   Hypertension: norvasc, metoprolol.   R CVC : exit site redness and tenderness. He got several doses of ancef. Will observe for now. This was placed due to large hematoma on LUE access > HeRO, S/P surgical evacuation.                     Review of Systems:   No complaints.           Medications:       - MEDICATION INSTRUCTIONS for Dialysis Patients -   Does not apply See Admin Instructions     sodium chloride 0.9%  250 mL Intravenous Once in dialysis     sodium chloride 0.9%  300 mL Hemodialysis Machine Once     allopurinol  100 mg Oral Daily     amLODIPine  5 mg Oral BID     calcium acetate  1,334 mg Oral TID w/meals     ceFAZolin  1 g Intravenous Q12H     clopidogrel  75 mg Oral Daily     doxercalciferol  2 mcg Intravenous Once in dialysis     epoetin gretchen (EPOGEN,PROCRIT) inj ESRD  10,000 Units Intravenous Once     heparin  3 mL Intracatheter During Hemodialysis (from stock)     heparin  3 mL Intracatheter During Hemodialysis (from stock)     heparin Lock (1000 units/mL High concentration)  3 mL Intracatheter Once     heparin Lock (1000 units/mL High concentration)  3 mL Intracatheter Once     insulin aspart  1-7 Units Subcutaneous TID AC     insulin aspart  1-5 Units Subcutaneous At Bedtime     insulin glargine  5 Units Subcutaneous At Bedtime     levETIRAcetam  500 mg Oral Daily     lisinopril  20 mg Oral Daily     metoprolol succinate ER  25 mg Oral Daily     - MEDICATION INSTRUCTIONS -   Does not apply Once     omeprazole  20 mg Oral QAM     sodium chloride (PF)  3 mL Intracatheter Q8H       sodium chloride Stopped (12/30/18 1443)     Current active medications and PTA medications reviewed, see medication list for details.            Physical Exam:   Vitals were reviewed  Patient Vitals for the past 24 hrs:    BP Temp Temp src Pulse Heart Rate Resp SpO2 Weight   18 0900 126/61 -- -- -- 76 -- 98 % --   18 0845 117/60 -- -- -- 75 -- 100 % --   18 0830 130/53 -- -- -- 73 -- 99 % --   18 0815 125/44 -- -- -- 76 -- 99 % --   18 0800 133/57 -- -- -- 78 -- 99 % --   18 0748 -- -- -- -- -- -- 99 % --   18 0452 -- -- -- -- -- -- -- 65.7 kg (144 lb 13.5 oz)   18 0400 119/57 97.5  F (36.4  C) Axillary 77 75 18 95 % --   18 0000 120/69 98.2  F (36.8  C) Axillary 75 77 18 98 % --   18 2033 128/65 98.3  F (36.8  C) Axillary 79 -- 18 96 % --   18 1556 125/64 98.1  F (36.7  C) Axillary 68 -- 16 93 % --   18 1455 121/60 -- -- -- 66 -- 98 % --   18 1430 125/68 -- -- -- 70 -- 98 % --   18 1353 126/63 97.7  F (36.5  C) Oral -- 70 16 -- --   18 1310 135/62 -- -- -- -- -- -- --   18 1220 -- 96.4  F (35.8  C) Temporal -- 79 20 96 % --       Temp:  [96.4  F (35.8  C)-98.3  F (36.8  C)] 97.5  F (36.4  C)  Pulse:  [68-79] 77  Heart Rate:  [66-79] 76  Resp:  [16-20] 18  BP: (117-135)/(44-69) 126/61  SpO2:  [93 %-100 %] 98 %    Temperatures:  Current - Temp: 97.5  F (36.4  C); Max - Temp  Av.7  F (36.5  C)  Min: 96.4  F (35.8  C)  Max: 98.3  F (36.8  C)  Respiration range: Resp  Av.7  Min: 16  Max: 20  Pulse range: Pulse  Av.8  Min: 68  Max: 79  Blood pressure range: Systolic (24hrs), Av , Min:117 , Max:135   ; Diastolic (24hrs), Av, Min:44, Max:69    Pulse oximetry range: SpO2  Av.5 %  Min: 93 %  Max: 100 %    I/O last 3 completed shifts:  In: 900 [P.O.:600; I.V.:300]  Out: 55 [Drains:55]      Intake/Output Summary (Last 24 hours) at 2018 0917  Last data filed at 2018 0500  Gross per 24 hour   Intake 900 ml   Output 55 ml   Net 845 ml       Somnolent  Lungs with scattered wheezes  Cor RRR nl S1 S2 no M or rub  LE no edema  R CVC with redness and tenderness at exit site.        Wt Readings from Last 4 Encounters:    12/31/18 65.7 kg (144 lb 13.5 oz)   11/21/18 62.8 kg (138 lb 8 oz)   09/20/18 54.4 kg (120 lb)   05/31/18 56.7 kg (125 lb)          Data:          Lab Results   Component Value Date     12/31/2018     12/30/2018     12/30/2018      Lab Results   Component Value Date    CHLORIDE 98 12/31/2018    CHLORIDE 97 12/30/2018    CHLORIDE 98 12/30/2018    Lab Results   Component Value Date    BUN 56 12/31/2018    BUN 53 12/30/2018    BUN 46 12/30/2018      Lab Results   Component Value Date    POTASSIUM 5.8 12/31/2018    POTASSIUM 5.0 12/30/2018    POTASSIUM 5.4 12/30/2018    Lab Results   Component Value Date    CO2 25 12/31/2018    CO2 27 12/30/2018    CO2 25 12/30/2018    Lab Results   Component Value Date    CR 8.70 12/31/2018    CR 8.47 12/30/2018    CR 7.86 12/30/2018        Recent Labs   Lab Test 12/31/18  0800 12/30/18  1849 12/30/18  0902 12/29/18  0945 12/28/18  1441 12/28/18  0805   WBC  --   --  6.5 5.0  --  5.8   HGB 6.4* 7.4* 7.6* 7.8* 7.8* 7.9*   HCT  --   --  22.7* 24.1*  --  23.4*   MCV  --   --  89 90  --  87   PLT  --   --  179 152 135* 133*     Recent Labs   Lab Test 12/26/18  1521 11/14/17  1259 10/06/17  1650  03/31/17  1307  12/18/15  1702  06/19/14  0451   AST 8 14 10   < > 42   < >  --    < > 39   ALT 10 18 17   < > 45   < >  --    < > 37   ALKPHOS 51 82 68   < > 62   < >  --    < > 160*   BILITOTAL 0.7 0.8 0.8   < > 0.6   < >  --    < > 0.7   BILICONJ  --   --   --   --   --   --   --   --  0.0   CRUZ  --   --   --   --  60*  --  22  --   --     < > = values in this interval not displayed.       Recent Labs   Lab Test 06/23/14  0326 06/22/14  0356 06/21/14  0010   MAG 2.0 1.8 1.7     Recent Labs   Lab Test 12/31/18  0800 12/21/15  0933 06/25/14  0900   PHOS 3.4 3.1 2.6     Recent Labs   Lab Test 12/31/18  0800 12/30/18  0902 12/30/18  0055   SHIRA 7.9* 8.7 8.5       Lab Results   Component Value Date    SHIRA 7.9 (L) 12/31/2018     Lab Results   Component Value Date    WBC 6.5  12/30/2018    HGB 6.4 (LL) 12/31/2018    HCT 22.7 (L) 12/30/2018    MCV 89 12/30/2018     12/30/2018     Lab Results   Component Value Date     12/31/2018    POTASSIUM 5.8 (H) 12/31/2018    CHLORIDE 98 12/31/2018    CO2 25 12/31/2018     (H) 12/31/2018     Lab Results   Component Value Date    BUN 56 (H) 12/31/2018    CR 8.70 (H) 12/31/2018     Lab Results   Component Value Date    MAG 2.0 06/23/2014     Lab Results   Component Value Date    PHOS 3.4 12/31/2018       Creatinine   Date Value Ref Range Status   12/31/2018 8.70 (H) 0.66 - 1.25 mg/dL Final   12/30/2018 8.47 (H) 0.66 - 1.25 mg/dL Final   12/30/2018 7.86 (H) 0.66 - 1.25 mg/dL Final   12/28/2018 11.50 (H) 0.66 - 1.25 mg/dL Final   12/27/2018 13.80 (H) 0.66 - 1.25 mg/dL Final   12/26/2018 12.40 (H) 0.66 - 1.25 mg/dL Final       Attestation:  I have reviewed today's vital signs, notes, medications, labs and imaging.  Seen on dialysis.      Greg Agrawal MD

## 2018-12-31 NOTE — PROVIDER NOTIFICATION
DATE:  12/31/2018   TIME OF RECEIPT FROM LAB:  0830  LAB TEST:  Hgb  LAB VALUE:  6.4  RESULTS GIVEN WITH READ-BACK TO (PROVIDER):    Dr. Pearson  TIME LAB VALUE REPORTED TO PROVIDER:   5918

## 2018-12-31 NOTE — PLAN OF CARE
VSS. Disoriented times. Family @ bedside. Up-2, turns 2. Wheel chair bound @ home. L arm dressing CDI. JAIR intact. LUE Numbness/pulses present, +4 edema. Weak LE doppler pulses. Tolerating diet. Denies pain. Tele SR 1* block

## 2018-12-31 NOTE — PLAN OF CARE
Hmong speaking. Family translating. A&O per family. VS on 3L O2. Turn q2hrs. Wheel chair bound at home. LUE severe edema, arm elevated. Dressing CDI. Pulses +2. Hbg recheck at 7.4 Tylenol given for pain. Anuria, pt receving dialysis. Plan for dialysis tomorrow.

## 2018-12-31 NOTE — PROGRESS NOTES
[facility]  Hospitalist Progress Note        Phillip Pearson, DO  12/31/2018        Interval History:      Patient seen in dialysis. Alert to voice, no  available this am.          Assessment and Plan:        83 year old male who presents with malfunctioning dialysis site.  Admitted for further evaluation and treatment.     Malfunctioning dialysis fistula: Patient with report from dialysis clinic that pressures elevated to high during dialysis for completion on Wednesday. , thus admitted to the hospital. Vascular surgery saw him. Bedside US showed patent HERO and Acuseal PTFE were patent. Tolerated dialysis on Thursday well. Central line has been inserted on 12/28 to give the right arm rest (hematoma)  - HD per nephrology.   - Vascular following.      HD (MWF).  - Per nephrology.  - Monitor electrolytes.     L arm hematoma (large) s/p surgical evacuation. due to extravasation. No pain with arm rest. >500cc clot/hematoma per report.   - Monitor HGB. Transfuse as clinically indicated.   - Further plan per vasc surgery.      Acute on chronic anemia:  Patient with hemoglobin of 6.7--6.1. Transfused. 7.9--7.8--7.8--7.6 this am. Likely due to above. Denies rectal bleed/melena/hematemesis. No recent HGB is available to me at this time.    - Monitor HGB and transfuse as needed.     Gout: Stable.  - Continue prior to admission allopurinol     Cardiac, hypertension: Patient maintained on amlodipine, Plavix, metoprolol, and lisinopril prior to admission.  - Continue prior to admission amlodipine.  - Continue prior to admission lisinopril.  - Continue prior to admission metoprolol.     Diabetes mellitus: Patient maintained on 10 units of Lantus in the evening.  - Insulin sliding scale.  - Cont Lantus at reduced dose.     GERD: Stable.  - Continue prior to admission omeprazole.     DVT Prophylaxis: Pneumatic Compression Devices     Disp: Likely 1-2 days pending clinical stability.                  "   Physical Exam:      Heart Rate: 75    Blood pressure 119/57, pulse 77, temperature 97.5  F (36.4  C), temperature source Axillary, resp. rate 18, height 1.549 m (5' 1\"), weight 65.7 kg (144 lb 13.5 oz), SpO2 95 %.    Vitals:    18 0625 18 0615 18 0452   Weight: 65.4 kg (144 lb 2.9 oz) 66.9 kg (147 lb 7.8 oz) 65.7 kg (144 lb 13.5 oz)       Vital Sign Ranges  Temperature Temp  Av.8  F (36.6  C)  Min: 96.4  F (35.8  C)  Max: 98.3  F (36.8  C)   Blood pressure Systolic (24hrs), Av , Min:119 , Max:164        Diastolic (24hrs), Av, Min:57, Max:83      Pulse Pulse  Av.8  Min: 68  Max: 79   Respirations Resp  Av  Min: 16  Max: 20   Pulse oximetry SpO2  Av.3 %  Min: 93 %  Max: 98 %     Vital Signs with Ranges  Temp:  [96.4  F (35.8  C)-98.3  F (36.8  C)] 97.5  F (36.4  C)  Pulse:  [68-79] 77  Heart Rate:  [66-79] 75  Resp:  [16-20] 18  BP: (119-164)/(57-83) 119/57  SpO2:  [93 %-98 %] 95 %    I/O Last 3 Shifts:   I/O last 3 completed shifts:  In: 900 [P.O.:600; I.V.:300]  Out: 55 [Drains:55]    I/O past 24 hours:     Intake/Output Summary (Last 24 hours) at 2018 0719  Last data filed at 2018 0500  Gross per 24 hour   Intake 900 ml   Output 55 ml   Net 845 ml     GENERAL: Alert. NAD. Conversational.   HEENT: Normocephalic. EOMI. No icterus or injection. Nares normal.   LUNGS: Clear to auscultation. No dyspnea at rest.   HEART: Regular rate. Extremities perfused.   ABDOMEN: Soft, nontender, and nondistended. Positive bowel sounds.   EXTREMITIES: No LE edema noted. Right arm with swelling, stable.   NEUROLOGIC: Moves extremities x4. Follows commands.           Prior to Admission Medications:        Medications Prior to Admission   Medication Sig Dispense Refill Last Dose     ALLOPURINOL PO Take 100 mg by mouth daily   Past Month at Uses prn     AMLODIPINE BESYLATE PO Take 5 mg by mouth 2 times daily   2018 at x1     B Complex-C-Folic Acid (DIALYVITE) TABS Take 1 " tablet by mouth daily    12/26/2018 at Unknown time     calcium acetate (PHOSLO) 667 MG CAPS Take 1,334 mg by mouth 3 times daily (with meals)    12/26/2018 at Unknown time     insulin glargine (LANTUS SOLOSTAR) 100 UNIT/ML pen Inject Subcutaneous At Bedtime Son states patient takes about 10 units   12/25/2018 at hs     levETIRAcetam (KEPPRA) 500 MG tablet Take 1 tablet (500 mg) by mouth daily 30 tablet 0 12/26/2018 at Unknown time     LISINOPRIL PO Take 20 mg by mouth daily   12/26/2018 at Unknown time     metoprolol (TOPROL-XL) 25 MG 24 hr tablet Take 1 tablet (25 mg) by mouth daily 30 tablet  12/26/2018 at Unknown time     OMEPRAZOLE PO Take 20 mg by mouth every morning   12/26/2018 at Unknown time     Cholecalciferol (VITAMIN D3 PO) Take 400 Units by mouth daily    11/20/2018     nitroglycerin (NITROSTAT) 0.4 MG sublingual tablet Place 0.4 mg under the tongue every 5 minutes as needed for chest pain For chest pain place 1 tablet under the tongue every 5 minutes for 3 doses. If symptoms persist 5 minutes after 1st dose call 911.   prn     Urea 40 % CREA Externally apply  topically 2 times daily. As directed. 180 g 3 prn            Medications:        Current Facility-Administered Medications   Medication Last Rate     sodium chloride Stopped (12/30/18 1443)     Current Facility-Administered Medications   Medication Dose Route Frequency     - MEDICATION INSTRUCTIONS for Dialysis Patients -   Does not apply See Admin Instructions     sodium chloride 0.9%  250 mL Intravenous Once in dialysis     sodium chloride 0.9%  300 mL Hemodialysis Machine Once     allopurinol  100 mg Oral Daily     amLODIPine  5 mg Oral BID     calcium acetate  1,334 mg Oral TID w/meals     ceFAZolin  1 g Intravenous Q12H     doxercalciferol  2 mcg Intravenous Once in dialysis     epoetin gretchen (EPOGEN,PROCRIT) inj ESRD  10,000 Units Intravenous Once     heparin  3 mL Intracatheter During Hemodialysis (from stock)     heparin  3 mL  Intracatheter During Hemodialysis (from stock)     heparin Lock (1000 units/mL High concentration)  3 mL Intracatheter Once     heparin Lock (1000 units/mL High concentration)  3 mL Intracatheter Once     insulin aspart  1-7 Units Subcutaneous TID AC     insulin aspart  1-5 Units Subcutaneous At Bedtime     insulin glargine  5 Units Subcutaneous At Bedtime     levETIRAcetam  500 mg Oral Daily     lisinopril  20 mg Oral Daily     metoprolol succinate ER  25 mg Oral Daily     - MEDICATION INSTRUCTIONS -   Does not apply Once     omeprazole  20 mg Oral QAM     sodium chloride (PF)  3 mL Intracatheter Q8H     Current Facility-Administered Medications   Medication Dose Route Frequency     sodium chloride 0.9%  100-150 mL Intravenous Q15 Min PRN     acetaminophen  650 mg Rectal Q4H PRN     acetaminophen  650 mg Oral Q4H PRN     bisacodyl  10 mg Rectal Daily PRN     glucose  15-30 g Oral Q15 Min PRN    Or     dextrose  25-50 mL Intravenous Q15 Min PRN    Or     glucagon  1 mg Subcutaneous Q15 Min PRN     labetalol  10 mg Intravenous Q2H PRN     lidocaine 4%   Topical Q1H PRN     lidocaine (buffered or not buffered)  1 mL Other Q1H PRN     melatonin  1 mg Oral At Bedtime PRN     naloxone  0.1-0.4 mg Intravenous Q2 Min PRN     ondansetron  4 mg Oral Q6H PRN    Or     ondansetron  4 mg Intravenous Q6H PRN     polyethylene glycol  17 g Oral Daily PRN     senna-docusate  1 tablet Oral BID PRN    Or     senna-docusate  2 tablet Oral BID PRN     sodium chloride (PF)  3 mL Intracatheter Q1H PRN            Data:      Lab data reviewed.   Recent Labs   Lab 12/30/18  1849 12/30/18  0902 12/30/18  0055 12/29/18  0945 12/28/18  1441 12/28/18  0805   HGB 7.4* 7.6*  --  7.8* 7.8* 7.9*   MCV  --  89  --  90  --  87   PLT  --  179  --  152 135* 133*   INR  --  1.24*  --   --  1.25*  --    NA  --  134 133  --   --  131*   POTASSIUM  --  5.0 5.4*  --   --  5.5*   CHLORIDE  --  97 98  --   --  96   CO2  --  27 25  --   --  23   BUN  --  53*  46*  --   --  79*   CR  --  8.47* 7.86*  --   --  11.50*   ANIONGAP  --  10 10  --   --  12   SHIRA  --  8.7 8.5  --   --  8.3*   GLC  --  104* 121*  --   --  100*           Imaging:      Imaging data reviewed.     Dr. Phillip Pearson D.O.  [facility]  Pager [phone]

## 2018-12-31 NOTE — PROGRESS NOTES
Vascular Surgery Progress Note    S:Comfortable.  Wife is here and says he is doing fine.    O:   Vitals:  BP  Min: 119/57  Max: 164/83  Temp  Av.8  F (36.6  C)  Min: 96.4  F (35.8  C)  Max: 98.3  F (36.8  C)  Pulse  Av.8  Min: 68  Max: 79  I/O last 3 completed shifts:  In: 900 [P.O.:600; I.V.:300]  Out: 55 [Drains:55]    Physical Exam:  Still with left arm edema but better with hematoma evacuated (>500 ml                                                  Clot)                             JAIR drain=55 ml                              Good bruit in HeRO (patent)      Assessment/Plan: Better.   Remove drain in AM.                                 Plavix for HeRO graft patency.                                 Will use right internal jugular tunnel catheter for 2-3 weeks to allow                                         left arm to resolve.                                  Discussed with wife.                Wm. Jose L MD

## 2018-12-31 NOTE — PROGRESS NOTES
DATE:  12/31/2018   TIME OF RECEIPT FROM LAB:  0824  LAB TEST:  Hgb  LAB VALUE:  6.4  RESULTS GIVEN WITH READ-BACK TO (PROVIDER): Pema Pruett RN

## 2018-12-31 NOTE — PROGRESS NOTES
Pt lethargic, does not communicate needs. Language barrier. When interpretor was available pt sometimes responded to interpretor. Afebrile. VSS. Lung sounds diminished with expiratory wheezes. On tele sinus rhythm with 1st degree AVB. Pt does not produce urine. Renal diet. Assist of 2. Swelling in LUE present, dressing CDI. Extremity elevated.

## 2018-12-31 NOTE — PLAN OF CARE
VSS on 2L, 85% on RA, Alert, oriented to self and situation. Pt is Hmong speaking - attempted to use blue phone  and Jabber with no success. Pt was able to answer and ask simple questions. Lungs sounds coarse with expiratory wheeze, infrequent cough, BS hypoactive. Hemodialysis fistula UTV with dressing CDI, significant edema of left arm. Radial pulses 2+ and equal, weak DP pulses. JAIR left arm with minimal serosanguinous drainage. Pt denied pain. Anuria. Wheelchair bound as per baseline. Dialysis diet. Plan for dialysis today.

## 2018-12-31 NOTE — PROGRESS NOTES
Potassium   Date Value Ref Range Status   12/31/2018 5.8 (H) 3.4 - 5.3 mmol/L Final   ]  Lab Results   Component Value Date    HGB 6.4 12/31/2018     Weight: 65.7 kg (144 lb 13.5 oz)    POST WT 63.7kg    DIALYSIS PROCEDURE NOTE  Hepatitis status of previous patient on machine log was checked and ok to use with this patient hepatitis status.    Patient dialyzed for 3 hrs on a 2 K bath with a  net fluid removal of 2L.  A BFR of 400 ml/min was obtained via RIJ.  Patient was seen by Dr. Agrawal during treatment.      Total heparin received during treatment:: 0 units.    Heparin instilled in both ports post run.      Meds given: Epogen & Hectorol, see MAR     Complications: none       Procedure and ESRD teaching done and questions answered.  See flowsheet in EPIC for further details and post assessment.    Machine water alarm in place and functioning. CHLORINE AND CHLORAMINES CHECK on WATER SYSTEM EVERY 4 hours.     HEPATITIS B SURFACE ANTIGEN negative Date 5/1/17 HEPATITIS B SURFACE ANTIBODY immune DATE 3/3/18    PT returned via bed. LOC lethargic, calm, cooperative  Catheter Access: Aseptic prep done for both on/off.    Report received from: BRENTON Pruett RN  Report given to: ERICA Cruz, Dialysis RN

## 2019-01-01 ENCOUNTER — APPOINTMENT (OUTPATIENT)
Dept: GENERAL RADIOLOGY | Facility: CLINIC | Age: 84
DRG: 871 | End: 2019-01-01
Payer: MEDICARE

## 2019-01-01 ENCOUNTER — APPOINTMENT (OUTPATIENT)
Dept: CARDIOLOGY | Facility: CLINIC | Age: 84
DRG: 871 | End: 2019-01-01
Payer: MEDICARE

## 2019-01-01 ENCOUNTER — APPOINTMENT (OUTPATIENT)
Dept: ULTRASOUND IMAGING | Facility: CLINIC | Age: 84
DRG: 871 | End: 2019-01-01
Payer: MEDICARE

## 2019-01-01 ENCOUNTER — HOSPITAL ENCOUNTER (INPATIENT)
Facility: CLINIC | Age: 84
LOS: 4 days | DRG: 871 | End: 2019-01-15
Attending: INTERNAL MEDICINE
Payer: MEDICARE

## 2019-01-01 ENCOUNTER — TRANSFERRED RECORDS (OUTPATIENT)
Dept: HEALTH INFORMATION MANAGEMENT | Facility: CLINIC | Age: 84
End: 2019-01-01

## 2019-01-01 VITALS
BODY MASS INDEX: 26.68 KG/M2 | HEART RATE: 87 BPM | OXYGEN SATURATION: 91 % | SYSTOLIC BLOOD PRESSURE: 117 MMHG | DIASTOLIC BLOOD PRESSURE: 58 MMHG | HEIGHT: 61 IN | TEMPERATURE: 98.2 F | WEIGHT: 141.31 LBS | RESPIRATION RATE: 16 BRPM

## 2019-01-01 VITALS
RESPIRATION RATE: 16 BRPM | SYSTOLIC BLOOD PRESSURE: 96 MMHG | BODY MASS INDEX: 27.33 KG/M2 | OXYGEN SATURATION: 92 % | DIASTOLIC BLOOD PRESSURE: 55 MMHG | TEMPERATURE: 96.8 F | HEART RATE: 86 BPM | WEIGHT: 144.62 LBS

## 2019-01-01 DIAGNOSIS — Z99.2 ESRD (END STAGE RENAL DISEASE) ON DIALYSIS (H): ICD-10-CM

## 2019-01-01 DIAGNOSIS — N18.6 ESRD (END STAGE RENAL DISEASE) ON DIALYSIS (H): ICD-10-CM

## 2019-01-01 DIAGNOSIS — J96.01 ACUTE RESPIRATORY FAILURE WITH HYPOXIA (H): ICD-10-CM

## 2019-01-01 DIAGNOSIS — E87.70 HYPERVOLEMIA, UNSPECIFIED HYPERVOLEMIA TYPE: ICD-10-CM

## 2019-01-01 DIAGNOSIS — J18.9 PNEUMONIA OF RIGHT LUNG DUE TO INFECTIOUS ORGANISM, UNSPECIFIED PART OF LUNG: ICD-10-CM

## 2019-01-01 LAB
ABO + RH BLD: NORMAL
ABO + RH BLD: NORMAL
ACID FAST STN SPEC QL: NORMAL
ACID FAST STN SPEC QL: NORMAL
ALBUMIN SERPL-MCNC: 2.3 G/DL (ref 3.4–5)
ALBUMIN SERPL-MCNC: 2.4 G/DL (ref 3.4–5)
ALBUMIN SERPL-MCNC: 2.5 G/DL (ref 3.4–5)
ALBUMIN SERPL-MCNC: 2.5 G/DL (ref 3.4–5)
ALBUMIN SERPL-MCNC: 2.6 G/DL (ref 3.4–5)
ALBUMIN SERPL-MCNC: 2.6 G/DL (ref 3.4–5)
ALBUMIN SERPL-MCNC: 2.7 G/DL (ref 3.4–5)
ALBUMIN SERPL-MCNC: 3.1 G/DL (ref 3.4–5)
ALP SERPL-CCNC: 100 U/L (ref 40–150)
ALP SERPL-CCNC: 102 U/L (ref 40–150)
ALP SERPL-CCNC: 106 U/L (ref 40–150)
ALP SERPL-CCNC: 108 U/L (ref 40–150)
ALP SERPL-CCNC: 108 U/L (ref 40–150)
ALP SERPL-CCNC: 116 U/L (ref 40–150)
ALP SERPL-CCNC: 119 U/L (ref 40–150)
ALP SERPL-CCNC: 119 U/L (ref 40–150)
ALP SERPL-CCNC: 130 U/L (ref 40–150)
ALP SERPL-CCNC: 87 U/L (ref 40–150)
ALP SERPL-CCNC: 93 U/L (ref 40–150)
ALT SERPL W P-5'-P-CCNC: 13 U/L (ref 0–70)
ALT SERPL W P-5'-P-CCNC: 149 U/L (ref 0–70)
ALT SERPL W P-5'-P-CCNC: 512 U/L (ref 0–70)
ALT SERPL W P-5'-P-CCNC: 514 U/L (ref 0–70)
ALT SERPL W P-5'-P-CCNC: 584 U/L (ref 0–70)
ALT SERPL W P-5'-P-CCNC: 606 U/L (ref 0–70)
ALT SERPL W P-5'-P-CCNC: 615 U/L (ref 0–70)
ALT SERPL W P-5'-P-CCNC: 674 U/L (ref 0–70)
ALT SERPL W P-5'-P-CCNC: 713 U/L (ref 0–70)
ALT SERPL W P-5'-P-CCNC: 818 U/L (ref 0–70)
ALT SERPL W P-5'-P-CCNC: 834 U/L (ref 0–70)
ANION GAP SERPL CALCULATED.3IONS-SCNC: 10 MMOL/L (ref 3–14)
ANION GAP SERPL CALCULATED.3IONS-SCNC: 11 MMOL/L (ref 3–14)
ANION GAP SERPL CALCULATED.3IONS-SCNC: 12 MMOL/L (ref 3–14)
ANION GAP SERPL CALCULATED.3IONS-SCNC: 16 MMOL/L (ref 3–14)
ANION GAP SERPL CALCULATED.3IONS-SCNC: 17 MMOL/L (ref 3–14)
ANION GAP SERPL CALCULATED.3IONS-SCNC: 7 MMOL/L (ref 3–14)
ANION GAP SERPL CALCULATED.3IONS-SCNC: 9 MMOL/L (ref 3–14)
ANISOCYTOSIS BLD QL SMEAR: SLIGHT
ANISOCYTOSIS BLD QL SMEAR: SLIGHT
APPEARANCE FLD: NORMAL
APTT PPP: 38 SEC (ref 22–37)
AST SERPL W P-5'-P-CCNC: 1118 U/L (ref 0–45)
AST SERPL W P-5'-P-CCNC: 1310 U/L (ref 0–45)
AST SERPL W P-5'-P-CCNC: 1780 U/L (ref 0–45)
AST SERPL W P-5'-P-CCNC: 2575 U/L (ref 0–45)
AST SERPL W P-5'-P-CCNC: 2858 U/L (ref 0–45)
AST SERPL W P-5'-P-CCNC: 29 U/L (ref 0–45)
AST SERPL W P-5'-P-CCNC: 3361 U/L (ref 0–45)
AST SERPL W P-5'-P-CCNC: 610 U/L (ref 0–45)
AST SERPL W P-5'-P-CCNC: 749 U/L (ref 0–45)
AST SERPL W P-5'-P-CCNC: 824 U/L (ref 0–45)
AST SERPL W P-5'-P-CCNC: 961 U/L (ref 0–45)
BACTERIA SPEC CULT: NO GROWTH
BACTERIA SPEC CULT: NORMAL
BASE DEFICIT BLDA-SCNC: 1.5 MMOL/L
BASE DEFICIT BLDA-SCNC: 3.2 MMOL/L
BASE DEFICIT BLDA-SCNC: 3.7 MMOL/L
BASE DEFICIT BLDA-SCNC: 5.8 MMOL/L
BASE DEFICIT BLDV-SCNC: 4.6 MMOL/L
BASE EXCESS BLDA CALC-SCNC: 2 MMOL/L
BASOPHILS # BLD AUTO: 0 10E9/L (ref 0–0.2)
BASOPHILS # BLD AUTO: 0.1 10E9/L (ref 0–0.2)
BASOPHILS NFR BLD AUTO: 0 %
BASOPHILS NFR BLD AUTO: 0.1 %
BASOPHILS NFR BLD AUTO: 0.1 %
BASOPHILS NFR BLD AUTO: 0.2 %
BASOPHILS NFR BLD AUTO: 0.2 %
BASOPHILS NFR BLD AUTO: 0.3 %
BASOPHILS NFR BLD AUTO: 0.3 %
BASOPHILS NFR BLD AUTO: 0.5 %
BASOPHILS NFR BLD AUTO: 0.7 %
BASOPHILS NFR BLD AUTO: 0.8 %
BILIRUB SERPL-MCNC: 0.6 MG/DL (ref 0.2–1.3)
BILIRUB SERPL-MCNC: 1 MG/DL (ref 0.2–1.3)
BILIRUB SERPL-MCNC: 1.1 MG/DL (ref 0.2–1.3)
BILIRUB SERPL-MCNC: 1.2 MG/DL (ref 0.2–1.3)
BILIRUB SERPL-MCNC: 1.2 MG/DL (ref 0.2–1.3)
BILIRUB SERPL-MCNC: 1.3 MG/DL (ref 0.2–1.3)
BILIRUB SERPL-MCNC: 1.3 MG/DL (ref 0.2–1.3)
BLD GP AB SCN SERPL QL: NORMAL
BLD PROD TYP BPU: NORMAL
BLD PROD TYP BPU: NORMAL
BLD UNIT ID BPU: 0
BLOOD BANK CMNT PATIENT-IMP: NORMAL
BLOOD PRODUCT CODE: NORMAL
BPU ID: NORMAL
BUN SERPL-MCNC: 21 MG/DL (ref 7–30)
BUN SERPL-MCNC: 21 MG/DL (ref 7–30)
BUN SERPL-MCNC: 24 MG/DL (ref 7–30)
BUN SERPL-MCNC: 24 MG/DL (ref 7–30)
BUN SERPL-MCNC: 25 MG/DL (ref 7–30)
BUN SERPL-MCNC: 26 MG/DL (ref 7–30)
BUN SERPL-MCNC: 27 MG/DL (ref 7–30)
BUN SERPL-MCNC: 30 MG/DL (ref 7–30)
BUN SERPL-MCNC: 32 MG/DL (ref 7–30)
BUN SERPL-MCNC: 33 MG/DL (ref 7–30)
BUN SERPL-MCNC: 34 MG/DL (ref 7–30)
BUN SERPL-MCNC: 37 MG/DL (ref 7–30)
BUN SERPL-MCNC: 40 MG/DL (ref 7–30)
BUN SERPL-MCNC: 58 MG/DL (ref 7–30)
BURR CELLS BLD QL SMEAR: SLIGHT
CA-I BLD-MCNC: 4 MG/DL (ref 4.4–5.2)
CA-I BLD-MCNC: 4.1 MG/DL (ref 4.4–5.2)
CA-I BLD-MCNC: 4.2 MG/DL (ref 4.4–5.2)
CA-I BLD-MCNC: 4.6 MG/DL (ref 4.4–5.2)
CA-I BLD-MCNC: 4.7 MG/DL (ref 4.4–5.2)
CA-I BLD-MCNC: 4.8 MG/DL (ref 4.4–5.2)
CA-I BLD-MCNC: 4.8 MG/DL (ref 4.4–5.2)
CA-I SERPL ISE-MCNC: 4.3 MG/DL (ref 4.4–5.2)
CA-I SERPL ISE-MCNC: 4.4 MG/DL (ref 4.4–5.2)
CA-I SERPL ISE-MCNC: 4.4 MG/DL (ref 4.4–5.2)
CALCIUM SERPL-MCNC: 7 MG/DL (ref 8.5–10.1)
CALCIUM SERPL-MCNC: 7.2 MG/DL (ref 8.5–10.1)
CALCIUM SERPL-MCNC: 7.4 MG/DL (ref 8.5–10.1)
CALCIUM SERPL-MCNC: 7.7 MG/DL (ref 8.5–10.1)
CALCIUM SERPL-MCNC: 7.8 MG/DL (ref 8.5–10.1)
CALCIUM SERPL-MCNC: 7.8 MG/DL (ref 8.5–10.1)
CALCIUM SERPL-MCNC: 7.9 MG/DL (ref 8.5–10.1)
CALCIUM SERPL-MCNC: 8 MG/DL (ref 8.5–10.1)
CALCIUM SERPL-MCNC: 8 MG/DL (ref 8.5–10.1)
CALCIUM SERPL-MCNC: 8.1 MG/DL (ref 8.5–10.1)
CALCIUM SERPL-MCNC: 8.1 MG/DL (ref 8.5–10.1)
CALCIUM SERPL-MCNC: 8.3 MG/DL (ref 8.5–10.1)
CALCIUM SERPL-MCNC: 8.3 MG/DL (ref 8.5–10.1)
CALCIUM SERPL-MCNC: 8.6 MG/DL (ref 8.5–10.1)
CHLORIDE SERPL-SCNC: 101 MMOL/L (ref 94–109)
CHLORIDE SERPL-SCNC: 102 MMOL/L (ref 94–109)
CHLORIDE SERPL-SCNC: 103 MMOL/L (ref 94–109)
CHLORIDE SERPL-SCNC: 104 MMOL/L (ref 94–109)
CHLORIDE SERPL-SCNC: 96 MMOL/L (ref 94–109)
CHLORIDE SERPL-SCNC: 97 MMOL/L (ref 94–109)
CHLORIDE SERPL-SCNC: 98 MMOL/L (ref 94–109)
CMV DNA SPEC NAA+PROBE-ACNC: NORMAL [IU]/ML
CMV DNA SPEC NAA+PROBE-LOG#: NORMAL {LOG_IU}/ML
CO2 BLDCOV-SCNC: 30 MMOL/L (ref 21–28)
CO2 SERPL-SCNC: 16 MMOL/L (ref 20–32)
CO2 SERPL-SCNC: 18 MMOL/L (ref 20–32)
CO2 SERPL-SCNC: 18 MMOL/L (ref 20–32)
CO2 SERPL-SCNC: 20 MMOL/L (ref 20–32)
CO2 SERPL-SCNC: 20 MMOL/L (ref 20–32)
CO2 SERPL-SCNC: 21 MMOL/L (ref 20–32)
CO2 SERPL-SCNC: 22 MMOL/L (ref 20–32)
CO2 SERPL-SCNC: 23 MMOL/L (ref 20–32)
CO2 SERPL-SCNC: 24 MMOL/L (ref 20–32)
CO2 SERPL-SCNC: 26 MMOL/L (ref 20–32)
CO2 SERPL-SCNC: 28 MMOL/L (ref 20–32)
CO2 SERPL-SCNC: 29 MMOL/L (ref 20–32)
COLOR FLD: NORMAL
COPATH REPORT: NORMAL
CORTIS SERPL-MCNC: 10.1 UG/DL (ref 4–22)
CREAT SERPL-MCNC: 2.02 MG/DL (ref 0.66–1.25)
CREAT SERPL-MCNC: 2.18 MG/DL (ref 0.66–1.25)
CREAT SERPL-MCNC: 2.33 MG/DL (ref 0.66–1.25)
CREAT SERPL-MCNC: 2.56 MG/DL (ref 0.66–1.25)
CREAT SERPL-MCNC: 3.09 MG/DL (ref 0.66–1.25)
CREAT SERPL-MCNC: 3.43 MG/DL (ref 0.66–1.25)
CREAT SERPL-MCNC: 3.91 MG/DL (ref 0.66–1.25)
CREAT SERPL-MCNC: 4.55 MG/DL (ref 0.66–1.25)
CREAT SERPL-MCNC: 5.04 MG/DL (ref 0.66–1.25)
CREAT SERPL-MCNC: 5.1 MG/DL (ref 0.66–1.25)
CREAT SERPL-MCNC: 5.24 MG/DL (ref 0.66–1.25)
CREAT SERPL-MCNC: 5.32 MG/DL (ref 0.66–1.25)
CREAT SERPL-MCNC: 7.32 MG/DL (ref 0.66–1.25)
CREAT SERPL-MCNC: 8.77 MG/DL (ref 0.66–1.25)
DIFFERENTIAL METHOD BLD: ABNORMAL
EOSINOPHIL # BLD AUTO: 0 10E9/L (ref 0–0.7)
EOSINOPHIL # BLD AUTO: 0.1 10E9/L (ref 0–0.7)
EOSINOPHIL # BLD AUTO: 0.3 10E9/L (ref 0–0.7)
EOSINOPHIL # BLD AUTO: 0.4 10E9/L (ref 0–0.7)
EOSINOPHIL # BLD AUTO: 0.5 10E9/L (ref 0–0.7)
EOSINOPHIL # BLD AUTO: 0.5 10E9/L (ref 0–0.7)
EOSINOPHIL # BLD AUTO: 0.6 10E9/L (ref 0–0.7)
EOSINOPHIL NFR BLD AUTO: 0 %
EOSINOPHIL NFR BLD AUTO: 0.1 %
EOSINOPHIL NFR BLD AUTO: 0.5 %
EOSINOPHIL NFR BLD AUTO: 1.9 %
EOSINOPHIL NFR BLD AUTO: 3.6 %
EOSINOPHIL NFR BLD AUTO: 4.2 %
EOSINOPHIL NFR BLD AUTO: 4.7 %
EOSINOPHIL NFR BLD AUTO: 5.8 %
EOSINOPHIL NFR FLD MANUAL: 11 %
ERYTHROCYTE [DISTWIDTH] IN BLOOD BY AUTOMATED COUNT: 15 % (ref 10–15)
ERYTHROCYTE [DISTWIDTH] IN BLOOD BY AUTOMATED COUNT: 15.1 % (ref 10–15)
ERYTHROCYTE [DISTWIDTH] IN BLOOD BY AUTOMATED COUNT: 15.6 % (ref 10–15)
ERYTHROCYTE [DISTWIDTH] IN BLOOD BY AUTOMATED COUNT: 15.7 % (ref 10–15)
ERYTHROCYTE [DISTWIDTH] IN BLOOD BY AUTOMATED COUNT: 15.8 % (ref 10–15)
ERYTHROCYTE [DISTWIDTH] IN BLOOD BY AUTOMATED COUNT: 15.9 % (ref 10–15)
FLUAV H1 2009 PAND RNA SPEC QL NAA+PROBE: NEGATIVE
FLUAV H1 2009 PAND RNA SPEC QL NAA+PROBE: NEGATIVE
FLUAV H1 RNA SPEC QL NAA+PROBE: NEGATIVE
FLUAV H1 RNA SPEC QL NAA+PROBE: NEGATIVE
FLUAV H3 RNA SPEC QL NAA+PROBE: NEGATIVE
FLUAV H3 RNA SPEC QL NAA+PROBE: NEGATIVE
FLUAV RNA SPEC QL NAA+PROBE: NEGATIVE
FLUAV RNA SPEC QL NAA+PROBE: NEGATIVE
FLUBV RNA SPEC QL NAA+PROBE: NEGATIVE
FLUBV RNA SPEC QL NAA+PROBE: NEGATIVE
GFR SERPL CREATININE-BSD FRML MDRD: 10 ML/MIN/{1.73_M2}
GFR SERPL CREATININE-BSD FRML MDRD: 10 ML/MIN/{1.73_M2}
GFR SERPL CREATININE-BSD FRML MDRD: 11 ML/MIN/{1.73_M2}
GFR SERPL CREATININE-BSD FRML MDRD: 13 ML/MIN/{1.73_M2}
GFR SERPL CREATININE-BSD FRML MDRD: 16 ML/MIN/{1.73_M2}
GFR SERPL CREATININE-BSD FRML MDRD: 18 ML/MIN/{1.73_M2}
GFR SERPL CREATININE-BSD FRML MDRD: 22 ML/MIN/{1.73_M2}
GFR SERPL CREATININE-BSD FRML MDRD: 25 ML/MIN/{1.73_M2}
GFR SERPL CREATININE-BSD FRML MDRD: 27 ML/MIN/{1.73_M2}
GFR SERPL CREATININE-BSD FRML MDRD: 30 ML/MIN/{1.73_M2}
GFR SERPL CREATININE-BSD FRML MDRD: 5 ML/MIN/{1.73_M2}
GFR SERPL CREATININE-BSD FRML MDRD: 6 ML/MIN/{1.73_M2}
GFR SERPL CREATININE-BSD FRML MDRD: 9 ML/MIN/{1.73_M2}
GFR SERPL CREATININE-BSD FRML MDRD: 9 ML/MIN/{1.73_M2}
GLUCOSE BLDC GLUCOMTR-MCNC: 100 MG/DL (ref 70–99)
GLUCOSE BLDC GLUCOMTR-MCNC: 112 MG/DL (ref 70–99)
GLUCOSE BLDC GLUCOMTR-MCNC: 112 MG/DL (ref 70–99)
GLUCOSE BLDC GLUCOMTR-MCNC: 115 MG/DL (ref 70–99)
GLUCOSE BLDC GLUCOMTR-MCNC: 120 MG/DL (ref 70–99)
GLUCOSE BLDC GLUCOMTR-MCNC: 127 MG/DL (ref 70–99)
GLUCOSE BLDC GLUCOMTR-MCNC: 136 MG/DL (ref 70–99)
GLUCOSE BLDC GLUCOMTR-MCNC: 138 MG/DL (ref 70–99)
GLUCOSE BLDC GLUCOMTR-MCNC: 138 MG/DL (ref 70–99)
GLUCOSE BLDC GLUCOMTR-MCNC: 141 MG/DL (ref 70–99)
GLUCOSE BLDC GLUCOMTR-MCNC: 145 MG/DL (ref 70–99)
GLUCOSE BLDC GLUCOMTR-MCNC: 146 MG/DL (ref 70–99)
GLUCOSE BLDC GLUCOMTR-MCNC: 147 MG/DL (ref 70–99)
GLUCOSE BLDC GLUCOMTR-MCNC: 152 MG/DL (ref 70–99)
GLUCOSE BLDC GLUCOMTR-MCNC: 155 MG/DL (ref 70–99)
GLUCOSE BLDC GLUCOMTR-MCNC: 157 MG/DL (ref 70–99)
GLUCOSE BLDC GLUCOMTR-MCNC: 164 MG/DL (ref 70–99)
GLUCOSE BLDC GLUCOMTR-MCNC: 164 MG/DL (ref 70–99)
GLUCOSE BLDC GLUCOMTR-MCNC: 175 MG/DL (ref 70–99)
GLUCOSE BLDC GLUCOMTR-MCNC: 177 MG/DL (ref 70–99)
GLUCOSE BLDC GLUCOMTR-MCNC: 186 MG/DL (ref 70–99)
GLUCOSE BLDC GLUCOMTR-MCNC: 214 MG/DL (ref 70–99)
GLUCOSE BLDC GLUCOMTR-MCNC: 232 MG/DL (ref 70–99)
GLUCOSE SERPL-MCNC: 100 MG/DL (ref 70–99)
GLUCOSE SERPL-MCNC: 105 MG/DL (ref 70–99)
GLUCOSE SERPL-MCNC: 134 MG/DL (ref 70–99)
GLUCOSE SERPL-MCNC: 134 MG/DL (ref 70–99)
GLUCOSE SERPL-MCNC: 141 MG/DL (ref 70–99)
GLUCOSE SERPL-MCNC: 146 MG/DL (ref 70–99)
GLUCOSE SERPL-MCNC: 146 MG/DL (ref 70–99)
GLUCOSE SERPL-MCNC: 157 MG/DL (ref 70–99)
GLUCOSE SERPL-MCNC: 159 MG/DL (ref 70–99)
GLUCOSE SERPL-MCNC: 160 MG/DL (ref 70–99)
GLUCOSE SERPL-MCNC: 180 MG/DL (ref 70–99)
GLUCOSE SERPL-MCNC: 230 MG/DL (ref 70–99)
GLUCOSE SERPL-MCNC: 244 MG/DL (ref 70–99)
GLUCOSE SERPL-MCNC: 84 MG/DL (ref 70–99)
GRAM STN SPEC: NORMAL
HADV DNA SPEC QL NAA+PROBE: NEGATIVE
HBV SURFACE AB SERPL IA-ACNC: 29.88 M[IU]/ML
HBV SURFACE AG SERPL QL IA: NONREACTIVE
HCO3 BLD-SCNC: 19 MMOL/L (ref 21–28)
HCO3 BLD-SCNC: 21 MMOL/L (ref 21–28)
HCO3 BLD-SCNC: 22 MMOL/L (ref 21–28)
HCO3 BLD-SCNC: 23 MMOL/L (ref 21–28)
HCO3 BLD-SCNC: 26 MMOL/L (ref 21–28)
HCO3 BLDV-SCNC: 22 MMOL/L (ref 21–28)
HCT VFR BLD AUTO: 22.3 % (ref 40–53)
HCT VFR BLD AUTO: 22.6 % (ref 40–53)
HCT VFR BLD AUTO: 23.1 % (ref 40–53)
HCT VFR BLD AUTO: 23.3 % (ref 40–53)
HCT VFR BLD AUTO: 23.9 % (ref 40–53)
HCT VFR BLD AUTO: 24.1 % (ref 40–53)
HCT VFR BLD AUTO: 24.7 % (ref 40–53)
HCT VFR BLD AUTO: 25.4 % (ref 40–53)
HCT VFR BLD AUTO: 25.4 % (ref 40–53)
HCT VFR BLD AUTO: 25.5 % (ref 40–53)
HCT VFR BLD AUTO: 25.7 % (ref 40–53)
HCT VFR BLD AUTO: 26.1 % (ref 40–53)
HCT VFR BLD AUTO: 26.5 % (ref 40–53)
HGB BLD-MCNC: 6.8 G/DL (ref 13.3–17.7)
HGB BLD-MCNC: 6.8 G/DL (ref 13.3–17.7)
HGB BLD-MCNC: 7 G/DL (ref 13.3–17.7)
HGB BLD-MCNC: 7 G/DL (ref 13.3–17.7)
HGB BLD-MCNC: 7.2 G/DL (ref 13.3–17.7)
HGB BLD-MCNC: 7.3 G/DL (ref 13.3–17.7)
HGB BLD-MCNC: 7.4 G/DL (ref 13.3–17.7)
HGB BLD-MCNC: 7.6 G/DL (ref 13.3–17.7)
HGB BLD-MCNC: 7.8 G/DL (ref 13.3–17.7)
HGB BLD-MCNC: 7.9 G/DL (ref 13.3–17.7)
HGB BLD-MCNC: 8.2 G/DL (ref 13.3–17.7)
HMPV RNA SPEC QL NAA+PROBE: NEGATIVE
HMPV RNA SPEC QL NAA+PROBE: NEGATIVE
HPIV1 RNA SPEC QL NAA+PROBE: NEGATIVE
HPIV1 RNA SPEC QL NAA+PROBE: NEGATIVE
HPIV2 RNA SPEC QL NAA+PROBE: NEGATIVE
HPIV2 RNA SPEC QL NAA+PROBE: NEGATIVE
HPIV3 RNA SPEC QL NAA+PROBE: NEGATIVE
HPIV3 RNA SPEC QL NAA+PROBE: NEGATIVE
IMM GRANULOCYTES # BLD: 0 10E9/L (ref 0–0.4)
IMM GRANULOCYTES # BLD: 0.1 10E9/L (ref 0–0.4)
IMM GRANULOCYTES # BLD: 0.1 10E9/L (ref 0–0.4)
IMM GRANULOCYTES NFR BLD: 0.1 %
IMM GRANULOCYTES NFR BLD: 0.2 %
IMM GRANULOCYTES NFR BLD: 0.3 %
IMM GRANULOCYTES NFR BLD: 0.4 %
INR PPP: 1.23 (ref 0.86–1.14)
INTERPRETATION ECG - MUSE: NORMAL
KOH PREP SPEC: NORMAL
LACTATE BLD-SCNC: 1.1 MMOL/L (ref 0.7–2)
LACTATE BLD-SCNC: 1.2 MMOL/L (ref 0.7–2.1)
LACTATE BLD-SCNC: 2.3 MMOL/L (ref 0.7–2)
LACTATE BLD-SCNC: 4.4 MMOL/L (ref 0.7–2)
LACTATE BLD-SCNC: 5.2 MMOL/L (ref 0.7–2)
LACTATE BLD-SCNC: 5.7 MMOL/L (ref 0.7–2)
LYMPHOCYTES # BLD AUTO: 0.2 10E9/L (ref 0.8–5.3)
LYMPHOCYTES # BLD AUTO: 0.3 10E9/L (ref 0.8–5.3)
LYMPHOCYTES # BLD AUTO: 0.4 10E9/L (ref 0.8–5.3)
LYMPHOCYTES # BLD AUTO: 0.4 10E9/L (ref 0.8–5.3)
LYMPHOCYTES # BLD AUTO: 0.7 10E9/L (ref 0.8–5.3)
LYMPHOCYTES # BLD AUTO: 0.9 10E9/L (ref 0.8–5.3)
LYMPHOCYTES # BLD AUTO: 0.9 10E9/L (ref 0.8–5.3)
LYMPHOCYTES # BLD AUTO: 1 10E9/L (ref 0.8–5.3)
LYMPHOCYTES # BLD AUTO: 1 10E9/L (ref 0.8–5.3)
LYMPHOCYTES # BLD AUTO: 1.1 10E9/L (ref 0.8–5.3)
LYMPHOCYTES NFR BLD AUTO: 10 %
LYMPHOCYTES NFR BLD AUTO: 12.6 %
LYMPHOCYTES NFR BLD AUTO: 2.4 %
LYMPHOCYTES NFR BLD AUTO: 2.8 %
LYMPHOCYTES NFR BLD AUTO: 3.5 %
LYMPHOCYTES NFR BLD AUTO: 3.8 %
LYMPHOCYTES NFR BLD AUTO: 4.1 %
LYMPHOCYTES NFR BLD AUTO: 6.1 %
LYMPHOCYTES NFR BLD AUTO: 7.5 %
LYMPHOCYTES NFR BLD AUTO: 8.1 %
LYMPHOCYTES NFR FLD MANUAL: 25 %
MAGNESIUM SERPL-MCNC: 1.5 MG/DL (ref 1.6–2.3)
MAGNESIUM SERPL-MCNC: 1.9 MG/DL (ref 1.6–2.3)
MAGNESIUM SERPL-MCNC: 2.4 MG/DL (ref 1.6–2.3)
MAGNESIUM SERPL-MCNC: 2.5 MG/DL (ref 1.6–2.3)
MAGNESIUM SERPL-MCNC: 2.5 MG/DL (ref 1.6–2.3)
MAGNESIUM SERPL-MCNC: 2.6 MG/DL (ref 1.6–2.3)
MAGNESIUM SERPL-MCNC: 2.7 MG/DL (ref 1.6–2.3)
MCH RBC QN AUTO: 27.6 PG (ref 26.5–33)
MCH RBC QN AUTO: 27.6 PG (ref 26.5–33)
MCH RBC QN AUTO: 27.7 PG (ref 26.5–33)
MCH RBC QN AUTO: 27.7 PG (ref 26.5–33)
MCH RBC QN AUTO: 27.8 PG (ref 26.5–33)
MCH RBC QN AUTO: 28 PG (ref 26.5–33)
MCH RBC QN AUTO: 28 PG (ref 26.5–33)
MCH RBC QN AUTO: 28.1 PG (ref 26.5–33)
MCH RBC QN AUTO: 28.2 PG (ref 26.5–33)
MCH RBC QN AUTO: 28.2 PG (ref 26.5–33)
MCH RBC QN AUTO: 29.5 PG (ref 26.5–33)
MCHC RBC AUTO-ENTMCNC: 28.8 G/DL (ref 31.5–36.5)
MCHC RBC AUTO-ENTMCNC: 29.1 G/DL (ref 31.5–36.5)
MCHC RBC AUTO-ENTMCNC: 29.8 G/DL (ref 31.5–36.5)
MCHC RBC AUTO-ENTMCNC: 29.8 G/DL (ref 31.5–36.5)
MCHC RBC AUTO-ENTMCNC: 29.9 G/DL (ref 31.5–36.5)
MCHC RBC AUTO-ENTMCNC: 29.9 G/DL (ref 31.5–36.5)
MCHC RBC AUTO-ENTMCNC: 30 G/DL (ref 31.5–36.5)
MCHC RBC AUTO-ENTMCNC: 30.1 G/DL (ref 31.5–36.5)
MCHC RBC AUTO-ENTMCNC: 30.3 G/DL (ref 31.5–36.5)
MCHC RBC AUTO-ENTMCNC: 30.5 G/DL (ref 31.5–36.5)
MCHC RBC AUTO-ENTMCNC: 30.5 G/DL (ref 31.5–36.5)
MCHC RBC AUTO-ENTMCNC: 30.7 G/DL (ref 31.5–36.5)
MCHC RBC AUTO-ENTMCNC: 33.2 G/DL (ref 31.5–36.5)
MCV RBC AUTO: 89 FL (ref 78–100)
MCV RBC AUTO: 91 FL (ref 78–100)
MCV RBC AUTO: 92 FL (ref 78–100)
MCV RBC AUTO: 93 FL (ref 78–100)
MCV RBC AUTO: 94 FL (ref 78–100)
MCV RBC AUTO: 95 FL (ref 78–100)
MCV RBC AUTO: 97 FL (ref 78–100)
MICROBIOLOGIST REVIEW: NORMAL
MICROBIOLOGIST REVIEW: NORMAL
MONOCYTES # BLD AUTO: 0.1 10E9/L (ref 0–1.3)
MONOCYTES # BLD AUTO: 0.2 10E9/L (ref 0–1.3)
MONOCYTES # BLD AUTO: 0.2 10E9/L (ref 0–1.3)
MONOCYTES # BLD AUTO: 0.3 10E9/L (ref 0–1.3)
MONOCYTES # BLD AUTO: 0.3 10E9/L (ref 0–1.3)
MONOCYTES # BLD AUTO: 0.5 10E9/L (ref 0–1.3)
MONOCYTES # BLD AUTO: 0.7 10E9/L (ref 0–1.3)
MONOCYTES # BLD AUTO: 1 10E9/L (ref 0–1.3)
MONOCYTES # BLD AUTO: 1.1 10E9/L (ref 0–1.3)
MONOCYTES # BLD AUTO: 2.1 10E9/L (ref 0–1.3)
MONOCYTES NFR BLD AUTO: 1.4 %
MONOCYTES NFR BLD AUTO: 1.7 %
MONOCYTES NFR BLD AUTO: 13.9 %
MONOCYTES NFR BLD AUTO: 2 %
MONOCYTES NFR BLD AUTO: 2.8 %
MONOCYTES NFR BLD AUTO: 2.8 %
MONOCYTES NFR BLD AUTO: 4.2 %
MONOCYTES NFR BLD AUTO: 5.2 %
MONOCYTES NFR BLD AUTO: 7.3 %
MONOCYTES NFR BLD AUTO: 8.4 %
MONOS+MACROS NFR FLD MANUAL: 39 %
MRSA DNA SPEC QL NAA+PROBE: NEGATIVE
NEUTROPHILS # BLD AUTO: 10.3 10E9/L (ref 1.6–8.3)
NEUTROPHILS # BLD AUTO: 11.7 10E9/L (ref 1.6–8.3)
NEUTROPHILS # BLD AUTO: 17.2 10E9/L (ref 1.6–8.3)
NEUTROPHILS # BLD AUTO: 6.6 10E9/L (ref 1.6–8.3)
NEUTROPHILS # BLD AUTO: 8 10E9/L (ref 1.6–8.3)
NEUTROPHILS # BLD AUTO: 9.4 10E9/L (ref 1.6–8.3)
NEUTROPHILS # BLD AUTO: 9.5 10E9/L (ref 1.6–8.3)
NEUTROPHILS # BLD AUTO: 9.6 10E9/L (ref 1.6–8.3)
NEUTROPHILS # BLD AUTO: 9.8 10E9/L (ref 1.6–8.3)
NEUTROPHILS # BLD AUTO: 9.9 10E9/L (ref 1.6–8.3)
NEUTROPHILS NFR BLD AUTO: 73.8 %
NEUTROPHILS NFR BLD AUTO: 77.3 %
NEUTROPHILS NFR BLD AUTO: 78.8 %
NEUTROPHILS NFR BLD AUTO: 82.9 %
NEUTROPHILS NFR BLD AUTO: 85 %
NEUTROPHILS NFR BLD AUTO: 91.3 %
NEUTROPHILS NFR BLD AUTO: 91.4 %
NEUTROPHILS NFR BLD AUTO: 93.2 %
NEUTROPHILS NFR BLD AUTO: 94.6 %
NEUTROPHILS NFR BLD AUTO: 94.8 %
NEUTS BAND NFR FLD MANUAL: 25 %
NRBC # BLD AUTO: 0 10*3/UL
NRBC # BLD AUTO: 0.2 10*3/UL
NRBC BLD AUTO-RTO: 0 /100
NRBC BLD AUTO-RTO: 1 /100
NUM BPU REQUESTED: 1
O2/TOTAL GAS SETTING VFR VENT: 35 %
O2/TOTAL GAS SETTING VFR VENT: 40 %
O2/TOTAL GAS SETTING VFR VENT: 45 %
O2/TOTAL GAS SETTING VFR VENT: 45 %
OVALOCYTES BLD QL SMEAR: SLIGHT
OVALOCYTES BLD QL SMEAR: SLIGHT
OXYHGB MFR BLD: 96 % (ref 92–100)
OXYHGB MFR BLD: 97 % (ref 92–100)
OXYHGB MFR BLDV: 53 %
PCO2 BLD: 35 MM HG (ref 35–45)
PCO2 BLD: 36 MM HG (ref 35–45)
PCO2 BLD: 38 MM HG (ref 35–45)
PCO2 BLD: 38 MM HG (ref 35–45)
PCO2 BLD: 39 MM HG (ref 35–45)
PCO2 BLDV: 42 MM HG (ref 40–50)
PCO2 BLDV: 45 MM HG (ref 40–50)
PH BLD: 7.34 PH (ref 7.35–7.45)
PH BLD: 7.36 PH (ref 7.35–7.45)
PH BLD: 7.38 PH (ref 7.35–7.45)
PH BLD: 7.39 PH (ref 7.35–7.45)
PH BLD: 7.45 PH (ref 7.35–7.45)
PH BLDV: 7.3 PH (ref 7.32–7.43)
PH BLDV: 7.45 PH (ref 7.32–7.43)
PHOSPHATE SERPL-MCNC: 4.4 MG/DL (ref 2.5–4.5)
PHOSPHATE SERPL-MCNC: 4.4 MG/DL (ref 2.5–4.5)
PHOSPHATE SERPL-MCNC: 4.5 MG/DL (ref 2.5–4.5)
PHOSPHATE SERPL-MCNC: 4.5 MG/DL (ref 2.5–4.5)
PHOSPHATE SERPL-MCNC: 4.6 MG/DL (ref 2.5–4.5)
PHOSPHATE SERPL-MCNC: 4.7 MG/DL (ref 2.5–4.5)
PHOSPHATE SERPL-MCNC: 4.9 MG/DL (ref 2.5–4.5)
PHOSPHATE SERPL-MCNC: 5 MG/DL (ref 2.5–4.5)
PHOSPHATE SERPL-MCNC: 5.3 MG/DL (ref 2.5–4.5)
PLATELET # BLD AUTO: 189 10E9/L (ref 150–450)
PLATELET # BLD AUTO: 198 10E9/L (ref 150–450)
PLATELET # BLD AUTO: 199 10E9/L (ref 150–450)
PLATELET # BLD AUTO: 213 10E9/L (ref 150–450)
PLATELET # BLD AUTO: 218 10E9/L (ref 150–450)
PLATELET # BLD AUTO: 227 10E9/L (ref 150–450)
PLATELET # BLD AUTO: 238 10E9/L (ref 150–450)
PLATELET # BLD AUTO: 240 10E9/L (ref 150–450)
PLATELET # BLD AUTO: 258 10E9/L (ref 150–450)
PLATELET # BLD AUTO: 277 10E9/L (ref 150–450)
PLATELET # BLD AUTO: 286 10E9/L (ref 150–450)
PLATELET # BLD AUTO: 299 10E9/L (ref 150–450)
PLATELET # BLD AUTO: 306 10E9/L (ref 150–450)
PLATELET # BLD AUTO: 320 10E9/L (ref 150–450)
PLATELET # BLD EST: ABNORMAL 10*3/UL
PO2 BLD: 106 MM HG (ref 80–105)
PO2 BLD: 127 MM HG (ref 80–105)
PO2 BLD: 128 MM HG (ref 80–105)
PO2 BLD: 147 MM HG (ref 80–105)
PO2 BLD: 64 MM HG (ref 80–105)
PO2 BLDV: 33 MM HG (ref 25–47)
PO2 BLDV: 35 MM HG (ref 25–47)
POIKILOCYTOSIS BLD QL SMEAR: SLIGHT
POIKILOCYTOSIS BLD QL SMEAR: SLIGHT
POTASSIUM BLD-SCNC: 6.2 MMOL/L (ref 3.4–5.3)
POTASSIUM SERPL-SCNC: 4.4 MMOL/L (ref 3.4–5.3)
POTASSIUM SERPL-SCNC: 4.6 MMOL/L (ref 3.4–5.3)
POTASSIUM SERPL-SCNC: 4.9 MMOL/L (ref 3.4–5.3)
POTASSIUM SERPL-SCNC: 5 MMOL/L (ref 3.4–5.3)
POTASSIUM SERPL-SCNC: 5 MMOL/L (ref 3.4–5.3)
POTASSIUM SERPL-SCNC: 5.3 MMOL/L (ref 3.4–5.3)
POTASSIUM SERPL-SCNC: 5.4 MMOL/L (ref 3.4–5.3)
POTASSIUM SERPL-SCNC: 5.4 MMOL/L (ref 3.4–5.3)
POTASSIUM SERPL-SCNC: 5.5 MMOL/L (ref 3.4–5.3)
POTASSIUM SERPL-SCNC: 5.6 MMOL/L (ref 3.4–5.3)
POTASSIUM SERPL-SCNC: 5.6 MMOL/L (ref 3.4–5.3)
POTASSIUM SERPL-SCNC: 5.7 MMOL/L (ref 3.4–5.3)
POTASSIUM SERPL-SCNC: 5.7 MMOL/L (ref 3.4–5.3)
POTASSIUM SERPL-SCNC: 5.8 MMOL/L (ref 3.4–5.3)
PROCALCITONIN SERPL-MCNC: 0.94 NG/ML
PROCALCITONIN SERPL-MCNC: 6.73 NG/ML
PROT SERPL-MCNC: 5.8 G/DL (ref 6.8–8.8)
PROT SERPL-MCNC: 6 G/DL (ref 6.8–8.8)
PROT SERPL-MCNC: 6.2 G/DL (ref 6.8–8.8)
PROT SERPL-MCNC: 6.2 G/DL (ref 6.8–8.8)
PROT SERPL-MCNC: 6.3 G/DL (ref 6.8–8.8)
PROT SERPL-MCNC: 6.3 G/DL (ref 6.8–8.8)
PROT SERPL-MCNC: 6.4 G/DL (ref 6.8–8.8)
PROT SERPL-MCNC: 6.4 G/DL (ref 6.8–8.8)
PROT SERPL-MCNC: 6.5 G/DL (ref 6.8–8.8)
PROT SERPL-MCNC: 6.6 G/DL (ref 6.8–8.8)
PROT SERPL-MCNC: 7.8 G/DL (ref 6.8–8.8)
RBC # BLD AUTO: 2.41 10E12/L (ref 4.4–5.9)
RBC # BLD AUTO: 2.42 10E12/L (ref 4.4–5.9)
RBC # BLD AUTO: 2.52 10E12/L (ref 4.4–5.9)
RBC # BLD AUTO: 2.53 10E12/L (ref 4.4–5.9)
RBC # BLD AUTO: 2.57 10E12/L (ref 4.4–5.9)
RBC # BLD AUTO: 2.61 10E12/L (ref 4.4–5.9)
RBC # BLD AUTO: 2.66 10E12/L (ref 4.4–5.9)
RBC # BLD AUTO: 2.74 10E12/L (ref 4.4–5.9)
RBC # BLD AUTO: 2.75 10E12/L (ref 4.4–5.9)
RBC # BLD AUTO: 2.75 10E12/L (ref 4.4–5.9)
RBC # BLD AUTO: 2.77 10E12/L (ref 4.4–5.9)
RBC # BLD AUTO: 2.78 10E12/L (ref 4.4–5.9)
RBC # BLD AUTO: 2.84 10E12/L (ref 4.4–5.9)
RHINOVIRUS RNA SPEC QL NAA+PROBE: NEGATIVE
RHINOVIRUS RNA SPEC QL NAA+PROBE: NEGATIVE
RSV RNA SPEC QL NAA+PROBE: NEGATIVE
SAO2 % BLDV FROM PO2: 66 %
SODIUM SERPL-SCNC: 133 MMOL/L (ref 133–144)
SODIUM SERPL-SCNC: 134 MMOL/L (ref 133–144)
SODIUM SERPL-SCNC: 135 MMOL/L (ref 133–144)
SODIUM SERPL-SCNC: 135 MMOL/L (ref 133–144)
SODIUM SERPL-SCNC: 136 MMOL/L (ref 133–144)
SODIUM SERPL-SCNC: 136 MMOL/L (ref 133–144)
SODIUM SERPL-SCNC: 137 MMOL/L (ref 133–144)
SODIUM SERPL-SCNC: 137 MMOL/L (ref 133–144)
SODIUM SERPL-SCNC: 138 MMOL/L (ref 133–144)
SPECIMEN EXP DATE BLD: NORMAL
SPECIMEN SOURCE FLD: NORMAL
SPECIMEN SOURCE: NORMAL
TRANSFUSION STATUS PATIENT QL: NORMAL
TRANSFUSION STATUS PATIENT QL: NORMAL
TROPONIN I SERPL-MCNC: <0.015 UG/L (ref 0–0.04)
VANCOMYCIN SERPL-MCNC: 21.4 MG/L
WBC # BLD AUTO: 10.1 10E9/L (ref 4–11)
WBC # BLD AUTO: 10.6 10E9/L (ref 4–11)
WBC # BLD AUTO: 10.7 10E9/L (ref 4–11)
WBC # BLD AUTO: 10.7 10E9/L (ref 4–11)
WBC # BLD AUTO: 11.1 10E9/L (ref 4–11)
WBC # BLD AUTO: 13 10E9/L (ref 4–11)
WBC # BLD AUTO: 15.2 10E9/L (ref 4–11)
WBC # BLD AUTO: 17.5 10E9/L (ref 4–11)
WBC # BLD AUTO: 18.8 10E9/L (ref 4–11)
WBC # BLD AUTO: 6.4 10E9/L (ref 4–11)
WBC # BLD AUTO: 9 10E9/L (ref 4–11)
WBC # BLD AUTO: 9.7 10E9/L (ref 4–11)
WBC # BLD AUTO: 9.9 10E9/L (ref 4–11)
WBC # FLD AUTO: 370 /UL

## 2019-01-01 PROCEDURE — 25000125 ZZHC RX 250: Performed by: INTERNAL MEDICINE

## 2019-01-01 PROCEDURE — 88108 CYTOPATH CONCENTRATE TECH: CPT | Performed by: INTERNAL MEDICINE

## 2019-01-01 PROCEDURE — 87205 SMEAR GRAM STAIN: CPT | Performed by: INTERNAL MEDICINE

## 2019-01-01 PROCEDURE — 94640 AIRWAY INHALATION TREATMENT: CPT | Mod: 76

## 2019-01-01 PROCEDURE — 5A1945Z RESPIRATORY VENTILATION, 24-96 CONSECUTIVE HOURS: ICD-10-PCS | Performed by: INTERNAL MEDICINE

## 2019-01-01 PROCEDURE — 40000281 ZZH STATISTIC TRANSPORT TIME EA 15 MIN

## 2019-01-01 PROCEDURE — 36620 INSERTION CATHETER ARTERY: CPT | Mod: GC | Performed by: SURGERY

## 2019-01-01 PROCEDURE — 80053 COMPREHEN METABOLIC PANEL: CPT | Performed by: INTERNAL MEDICINE

## 2019-01-01 PROCEDURE — 25000128 H RX IP 250 OP 636: Performed by: STUDENT IN AN ORGANIZED HEALTH CARE EDUCATION/TRAINING PROGRAM

## 2019-01-01 PROCEDURE — 96365 THER/PROPH/DIAG IV INF INIT: CPT | Performed by: INTERNAL MEDICINE

## 2019-01-01 PROCEDURE — 82803 BLOOD GASES ANY COMBINATION: CPT | Performed by: STUDENT IN AN ORGANIZED HEALTH CARE EDUCATION/TRAINING PROGRAM

## 2019-01-01 PROCEDURE — 96375 TX/PRO/DX INJ NEW DRUG ADDON: CPT | Performed by: INTERNAL MEDICINE

## 2019-01-01 PROCEDURE — 94640 AIRWAY INHALATION TREATMENT: CPT

## 2019-01-01 PROCEDURE — A9270 NON-COVERED ITEM OR SERVICE: HCPCS | Mod: GY | Performed by: HOSPITALIST

## 2019-01-01 PROCEDURE — 25000128 H RX IP 250 OP 636: Performed by: INTERNAL MEDICINE

## 2019-01-01 PROCEDURE — 90937 HEMODIALYSIS REPEATED EVAL: CPT

## 2019-01-01 PROCEDURE — 27210432 ZZH NUTRITION PRODUCT RENAL BASIC LITER

## 2019-01-01 PROCEDURE — 87102 FUNGUS ISOLATION CULTURE: CPT | Performed by: INTERNAL MEDICINE

## 2019-01-01 PROCEDURE — A9270 NON-COVERED ITEM OR SERVICE: HCPCS | Mod: GY

## 2019-01-01 PROCEDURE — 99291 CRITICAL CARE FIRST HOUR: CPT | Mod: 25 | Performed by: INTERNAL MEDICINE

## 2019-01-01 PROCEDURE — 25000125 ZZHC RX 250: Performed by: STUDENT IN AN ORGANIZED HEALTH CARE EDUCATION/TRAINING PROGRAM

## 2019-01-01 PROCEDURE — 90947 DIALYSIS REPEATED EVAL: CPT

## 2019-01-01 PROCEDURE — 80048 BASIC METABOLIC PNL TOTAL CA: CPT | Performed by: HOSPITALIST

## 2019-01-01 PROCEDURE — 36415 COLL VENOUS BLD VENIPUNCTURE: CPT | Performed by: HOSPITALIST

## 2019-01-01 PROCEDURE — 31624 DX BRONCHOSCOPE/LAVAGE: CPT | Mod: GC | Performed by: INTERNAL MEDICINE

## 2019-01-01 PROCEDURE — 82330 ASSAY OF CALCIUM: CPT | Performed by: STUDENT IN AN ORGANIZED HEALTH CARE EDUCATION/TRAINING PROGRAM

## 2019-01-01 PROCEDURE — 82330 ASSAY OF CALCIUM: CPT

## 2019-01-01 PROCEDURE — 84100 ASSAY OF PHOSPHORUS: CPT | Performed by: INTERNAL MEDICINE

## 2019-01-01 PROCEDURE — 20000004 ZZH R&B ICU UMMC

## 2019-01-01 PROCEDURE — 25000132 ZZH RX MED GY IP 250 OP 250 PS 637: Mod: GY | Performed by: INTERNAL MEDICINE

## 2019-01-01 PROCEDURE — 83605 ASSAY OF LACTIC ACID: CPT | Performed by: STUDENT IN AN ORGANIZED HEALTH CARE EDUCATION/TRAINING PROGRAM

## 2019-01-01 PROCEDURE — 86923 COMPATIBILITY TEST ELECTRIC: CPT

## 2019-01-01 PROCEDURE — 25000128 H RX IP 250 OP 636

## 2019-01-01 PROCEDURE — 25000132 ZZH RX MED GY IP 250 OP 250 PS 637: Mod: GY

## 2019-01-01 PROCEDURE — 80053 COMPREHEN METABOLIC PANEL: CPT

## 2019-01-01 PROCEDURE — 82803 BLOOD GASES ANY COMBINATION: CPT

## 2019-01-01 PROCEDURE — P9016 RBC LEUKOCYTES REDUCED: HCPCS

## 2019-01-01 PROCEDURE — G0499 HEPB SCREEN HIGH RISK INDIV: HCPCS | Performed by: INTERNAL MEDICINE

## 2019-01-01 PROCEDURE — 86706 HEP B SURFACE ANTIBODY: CPT | Performed by: INTERNAL MEDICINE

## 2019-01-01 PROCEDURE — 93308 TTE F-UP OR LMTD: CPT

## 2019-01-01 PROCEDURE — 93010 ELECTROCARDIOGRAM REPORT: CPT | Mod: 59 | Performed by: INTERNAL MEDICINE

## 2019-01-01 PROCEDURE — 76700 US EXAM ABDOM COMPLETE: CPT

## 2019-01-01 PROCEDURE — 25000131 ZZH RX MED GY IP 250 OP 636 PS 637: Mod: GY | Performed by: HOSPITALIST

## 2019-01-01 PROCEDURE — 99239 HOSP IP/OBS DSCHRG MGMT >30: CPT | Performed by: INTERNAL MEDICINE

## 2019-01-01 PROCEDURE — 82803 BLOOD GASES ANY COMBINATION: CPT | Performed by: INTERNAL MEDICINE

## 2019-01-01 PROCEDURE — 27210136 ZZH KIT CATH ARTERIAL EXT SUPPLY

## 2019-01-01 PROCEDURE — 87640 STAPH A DNA AMP PROBE: CPT | Performed by: STUDENT IN AN ORGANIZED HEALTH CARE EDUCATION/TRAINING PROGRAM

## 2019-01-01 PROCEDURE — 85025 COMPLETE CBC W/AUTO DIFF WBC: CPT | Performed by: INTERNAL MEDICINE

## 2019-01-01 PROCEDURE — 40000275 ZZH STATISTIC RCP TIME EA 10 MIN

## 2019-01-01 PROCEDURE — 87040 BLOOD CULTURE FOR BACTERIA: CPT | Performed by: INTERNAL MEDICINE

## 2019-01-01 PROCEDURE — 36415 COLL VENOUS BLD VENIPUNCTURE: CPT | Performed by: STUDENT IN AN ORGANIZED HEALTH CARE EDUCATION/TRAINING PROGRAM

## 2019-01-01 PROCEDURE — 36415 COLL VENOUS BLD VENIPUNCTURE: CPT | Performed by: INTERNAL MEDICINE

## 2019-01-01 PROCEDURE — 83605 ASSAY OF LACTIC ACID: CPT

## 2019-01-01 PROCEDURE — 5A1D70Z PERFORMANCE OF URINARY FILTRATION, INTERMITTENT, LESS THAN 6 HOURS PER DAY: ICD-10-PCS

## 2019-01-01 PROCEDURE — 80202 ASSAY OF VANCOMYCIN: CPT | Performed by: INTERNAL MEDICINE

## 2019-01-01 PROCEDURE — 25000125 ZZHC RX 250

## 2019-01-01 PROCEDURE — 85610 PROTHROMBIN TIME: CPT | Performed by: INTERNAL MEDICINE

## 2019-01-01 PROCEDURE — 93010 ELECTROCARDIOGRAM REPORT: CPT | Performed by: INTERNAL MEDICINE

## 2019-01-01 PROCEDURE — 40000276 ZZH STATISTIC RCP TIME ED VENT EA 10 MIN

## 2019-01-01 PROCEDURE — 82805 BLOOD GASES W/O2 SATURATION: CPT | Performed by: STUDENT IN AN ORGANIZED HEALTH CARE EDUCATION/TRAINING PROGRAM

## 2019-01-01 PROCEDURE — 85730 THROMBOPLASTIN TIME PARTIAL: CPT | Performed by: INTERNAL MEDICINE

## 2019-01-01 PROCEDURE — 82533 TOTAL CORTISOL: CPT

## 2019-01-01 PROCEDURE — 25000132 ZZH RX MED GY IP 250 OP 250 PS 637: Mod: GY | Performed by: STUDENT IN AN ORGANIZED HEALTH CARE EDUCATION/TRAINING PROGRAM

## 2019-01-01 PROCEDURE — 88305 TISSUE EXAM BY PATHOLOGIST: CPT | Performed by: INTERNAL MEDICINE

## 2019-01-01 PROCEDURE — 85027 COMPLETE CBC AUTOMATED: CPT

## 2019-01-01 PROCEDURE — A9270 NON-COVERED ITEM OR SERVICE: HCPCS | Mod: GY | Performed by: SURGERY

## 2019-01-01 PROCEDURE — 99291 CRITICAL CARE FIRST HOUR: CPT | Mod: 25

## 2019-01-01 PROCEDURE — 86900 BLOOD TYPING SEROLOGIC ABO: CPT

## 2019-01-01 PROCEDURE — 87070 CULTURE OTHR SPECIMN AEROBIC: CPT | Performed by: INTERNAL MEDICINE

## 2019-01-01 PROCEDURE — 83735 ASSAY OF MAGNESIUM: CPT | Performed by: INTERNAL MEDICINE

## 2019-01-01 PROCEDURE — 84145 PROCALCITONIN (PCT): CPT

## 2019-01-01 PROCEDURE — 82330 ASSAY OF CALCIUM: CPT | Performed by: INTERNAL MEDICINE

## 2019-01-01 PROCEDURE — 0B9D8ZX DRAINAGE OF RIGHT MIDDLE LUNG LOBE, VIA NATURAL OR ARTIFICIAL OPENING ENDOSCOPIC, DIAGNOSTIC: ICD-10-PCS | Performed by: INTERNAL MEDICINE

## 2019-01-01 PROCEDURE — 94002 VENT MGMT INPAT INIT DAY: CPT

## 2019-01-01 PROCEDURE — A9270 NON-COVERED ITEM OR SERVICE: HCPCS | Mod: GY | Performed by: INTERNAL MEDICINE

## 2019-01-01 PROCEDURE — 71045 X-RAY EXAM CHEST 1 VIEW: CPT

## 2019-01-01 PROCEDURE — 87070 CULTURE OTHR SPECIMN AEROBIC: CPT | Performed by: STUDENT IN AN ORGANIZED HEALTH CARE EDUCATION/TRAINING PROGRAM

## 2019-01-01 PROCEDURE — 89051 BODY FLUID CELL COUNT: CPT | Performed by: INTERNAL MEDICINE

## 2019-01-01 PROCEDURE — 84132 ASSAY OF SERUM POTASSIUM: CPT | Performed by: STUDENT IN AN ORGANIZED HEALTH CARE EDUCATION/TRAINING PROGRAM

## 2019-01-01 PROCEDURE — 12000000 ZZH R&B MED SURG/OB

## 2019-01-01 PROCEDURE — 31500 INSERT EMERGENCY AIRWAY: CPT | Mod: Z6 | Performed by: INTERNAL MEDICINE

## 2019-01-01 PROCEDURE — 99285 EMERGENCY DEPT VISIT HI MDM: CPT | Mod: 25 | Performed by: INTERNAL MEDICINE

## 2019-01-01 PROCEDURE — 99291 CRITICAL CARE FIRST HOUR: CPT | Mod: GC | Performed by: INTERNAL MEDICINE

## 2019-01-01 PROCEDURE — 25000132 ZZH RX MED GY IP 250 OP 250 PS 637: Mod: GY | Performed by: SURGERY

## 2019-01-01 PROCEDURE — 85027 COMPLETE CBC AUTOMATED: CPT | Performed by: HOSPITALIST

## 2019-01-01 PROCEDURE — 94003 VENT MGMT INPAT SUBQ DAY: CPT

## 2019-01-01 PROCEDURE — 36600 WITHDRAWAL OF ARTERIAL BLOOD: CPT

## 2019-01-01 PROCEDURE — 93005 ELECTROCARDIOGRAM TRACING: CPT

## 2019-01-01 PROCEDURE — 96367 TX/PROPH/DG ADDL SEQ IV INF: CPT | Performed by: INTERNAL MEDICINE

## 2019-01-01 PROCEDURE — 25000132 ZZH RX MED GY IP 250 OP 250 PS 637: Mod: GY | Performed by: HOSPITALIST

## 2019-01-01 PROCEDURE — 87116 MYCOBACTERIA CULTURE: CPT | Performed by: INTERNAL MEDICINE

## 2019-01-01 PROCEDURE — 25000131 ZZH RX MED GY IP 250 OP 636 PS 637: Mod: GY | Performed by: STUDENT IN AN ORGANIZED HEALTH CARE EDUCATION/TRAINING PROGRAM

## 2019-01-01 PROCEDURE — 00000146 ZZHCL STATISTIC GLUCOSE BY METER IP

## 2019-01-01 PROCEDURE — 00000155 ZZHCL STATISTIC H-CELL BLOCK W/STAIN: Performed by: INTERNAL MEDICINE

## 2019-01-01 PROCEDURE — 93321 DOPPLER ECHO F-UP/LMTD STD: CPT | Mod: 26 | Performed by: INTERNAL MEDICINE

## 2019-01-01 PROCEDURE — 36620 INSERTION CATHETER ARTERY: CPT

## 2019-01-01 PROCEDURE — 36415 COLL VENOUS BLD VENIPUNCTURE: CPT

## 2019-01-01 PROCEDURE — 93325 DOPPLER ECHO COLOR FLOW MAPG: CPT | Mod: 26 | Performed by: INTERNAL MEDICINE

## 2019-01-01 PROCEDURE — 87040 BLOOD CULTURE FOR BACTERIA: CPT

## 2019-01-01 PROCEDURE — 84484 ASSAY OF TROPONIN QUANT: CPT | Performed by: INTERNAL MEDICINE

## 2019-01-01 PROCEDURE — 80048 BASIC METABOLIC PNL TOTAL CA: CPT | Performed by: INTERNAL MEDICINE

## 2019-01-01 PROCEDURE — 82805 BLOOD GASES W/O2 SATURATION: CPT

## 2019-01-01 PROCEDURE — P9041 ALBUMIN (HUMAN),5%, 50ML: HCPCS | Performed by: STUDENT IN AN ORGANIZED HEALTH CARE EDUCATION/TRAINING PROGRAM

## 2019-01-01 PROCEDURE — 25000131 ZZH RX MED GY IP 250 OP 636 PS 637: Mod: GY | Performed by: INTERNAL MEDICINE

## 2019-01-01 PROCEDURE — 40000986 XR CHEST PORT 1 VW

## 2019-01-01 PROCEDURE — 86901 BLOOD TYPING SEROLOGIC RH(D): CPT

## 2019-01-01 PROCEDURE — 86850 RBC ANTIBODY SCREEN: CPT

## 2019-01-01 PROCEDURE — 87206 SMEAR FLUORESCENT/ACID STAI: CPT | Performed by: INTERNAL MEDICINE

## 2019-01-01 PROCEDURE — 31500 INSERT EMERGENCY AIRWAY: CPT | Performed by: INTERNAL MEDICINE

## 2019-01-01 PROCEDURE — 87205 SMEAR GRAM STAIN: CPT | Performed by: STUDENT IN AN ORGANIZED HEALTH CARE EDUCATION/TRAINING PROGRAM

## 2019-01-01 PROCEDURE — 99232 SBSQ HOSP IP/OBS MODERATE 35: CPT | Performed by: INTERNAL MEDICINE

## 2019-01-01 PROCEDURE — 93005 ELECTROCARDIOGRAM TRACING: CPT | Performed by: INTERNAL MEDICINE

## 2019-01-01 PROCEDURE — 93308 TTE F-UP OR LMTD: CPT | Mod: 26 | Performed by: INTERNAL MEDICINE

## 2019-01-01 PROCEDURE — 40000014 ZZH STATISTIC ARTERIAL MONITORING DAILY

## 2019-01-01 PROCEDURE — 36556 INSERT NON-TUNNEL CV CATH: CPT | Mod: GC | Performed by: SURGERY

## 2019-01-01 PROCEDURE — 87015 SPECIMEN INFECT AGNT CONCNTJ: CPT | Performed by: INTERNAL MEDICINE

## 2019-01-01 PROCEDURE — 87633 RESP VIRUS 12-25 TARGETS: CPT | Performed by: INTERNAL MEDICINE

## 2019-01-01 PROCEDURE — 3E033XZ INTRODUCTION OF VASOPRESSOR INTO PERIPHERAL VEIN, PERCUTANEOUS APPROACH: ICD-10-PCS | Performed by: INTERNAL MEDICINE

## 2019-01-01 PROCEDURE — 87641 MR-STAPH DNA AMP PROBE: CPT | Performed by: STUDENT IN AN ORGANIZED HEALTH CARE EDUCATION/TRAINING PROGRAM

## 2019-01-01 PROCEDURE — 87210 SMEAR WET MOUNT SALINE/INK: CPT | Performed by: INTERNAL MEDICINE

## 2019-01-01 PROCEDURE — A9270 NON-COVERED ITEM OR SERVICE: HCPCS | Mod: GY | Performed by: STUDENT IN AN ORGANIZED HEALTH CARE EDUCATION/TRAINING PROGRAM

## 2019-01-01 PROCEDURE — 84145 PROCALCITONIN (PCT): CPT | Performed by: STUDENT IN AN ORGANIZED HEALTH CARE EDUCATION/TRAINING PROGRAM

## 2019-01-01 PROCEDURE — 63400005 ZZH RX 634: Performed by: INTERNAL MEDICINE

## 2019-01-01 PROCEDURE — 80048 BASIC METABOLIC PNL TOTAL CA: CPT

## 2019-01-01 PROCEDURE — 96366 THER/PROPH/DIAG IV INF ADDON: CPT | Performed by: INTERNAL MEDICINE

## 2019-01-01 PROCEDURE — 31624 DX BRONCHOSCOPE/LAVAGE: CPT

## 2019-01-01 PROCEDURE — 85049 AUTOMATED PLATELET COUNT: CPT | Performed by: STUDENT IN AN ORGANIZED HEALTH CARE EDUCATION/TRAINING PROGRAM

## 2019-01-01 PROCEDURE — 87081 CULTURE SCREEN ONLY: CPT | Performed by: INTERNAL MEDICINE

## 2019-01-01 RX ORDER — ALBUMIN, HUMAN INJ 5% 5 %
12.5 SOLUTION INTRAVENOUS ONCE
Status: COMPLETED | OUTPATIENT
Start: 2019-01-01 | End: 2019-01-01

## 2019-01-01 RX ORDER — DEXTROSE MONOHYDRATE 25 G/50ML
25-50 INJECTION, SOLUTION INTRAVENOUS
Status: DISCONTINUED | OUTPATIENT
Start: 2019-01-01 | End: 2019-01-01

## 2019-01-01 RX ORDER — PROPOFOL 10 MG/ML
5-75 INJECTION, EMULSION INTRAVENOUS CONTINUOUS
Status: DISCONTINUED | OUTPATIENT
Start: 2019-01-01 | End: 2019-01-01

## 2019-01-01 RX ORDER — PIPERACILLIN SODIUM, TAZOBACTAM SODIUM 4; .5 G/20ML; G/20ML
4.5 INJECTION, POWDER, LYOPHILIZED, FOR SOLUTION INTRAVENOUS EVERY 6 HOURS
Status: DISCONTINUED | OUTPATIENT
Start: 2019-01-01 | End: 2019-01-01

## 2019-01-01 RX ORDER — DOPAMINE HYDROCHLORIDE 160 MG/100ML
2-20 INJECTION, SOLUTION INTRAVENOUS CONTINUOUS
Status: DISCONTINUED | OUTPATIENT
Start: 2019-01-01 | End: 2019-01-01

## 2019-01-01 RX ORDER — LEVETIRACETAM 500 MG/1
500 TABLET ORAL DAILY
Status: DISCONTINUED | OUTPATIENT
Start: 2019-01-01 | End: 2019-01-01

## 2019-01-01 RX ORDER — DOXERCALCIFEROL 4 UG/2ML
4 INJECTION INTRAVENOUS
Status: COMPLETED | OUTPATIENT
Start: 2019-01-01 | End: 2019-01-01

## 2019-01-01 RX ORDER — IPRATROPIUM BROMIDE AND ALBUTEROL SULFATE 2.5; .5 MG/3ML; MG/3ML
3 SOLUTION RESPIRATORY (INHALATION) EVERY 4 HOURS PRN
Status: DISCONTINUED | OUTPATIENT
Start: 2019-01-01 | End: 2019-01-01 | Stop reason: HOSPADM

## 2019-01-01 RX ORDER — B COMPLEX C NO.10/FOLIC ACID 900MCG/5ML
5 LIQUID (ML) ORAL DAILY
Status: DISCONTINUED | OUTPATIENT
Start: 2019-01-01 | End: 2019-01-01

## 2019-01-01 RX ORDER — NICOTINE POLACRILEX 4 MG
15-30 LOZENGE BUCCAL
Status: DISCONTINUED | OUTPATIENT
Start: 2019-01-01 | End: 2019-01-01 | Stop reason: HOSPADM

## 2019-01-01 RX ORDER — POTASSIUM CHLORIDE 29.8 MG/ML
20 INJECTION INTRAVENOUS EVERY 6 HOURS PRN
Status: DISCONTINUED | OUTPATIENT
Start: 2019-01-01 | End: 2019-01-01 | Stop reason: HOSPADM

## 2019-01-01 RX ORDER — LIDOCAINE HYDROCHLORIDE 10 MG/ML
INJECTION, SOLUTION EPIDURAL; INFILTRATION; INTRACAUDAL; PERINEURAL
Status: COMPLETED
Start: 2019-01-01 | End: 2019-01-01

## 2019-01-01 RX ORDER — NALOXONE HYDROCHLORIDE 0.4 MG/ML
.1-.4 INJECTION, SOLUTION INTRAMUSCULAR; INTRAVENOUS; SUBCUTANEOUS
Status: DISCONTINUED | OUTPATIENT
Start: 2019-01-01 | End: 2019-01-01

## 2019-01-01 RX ORDER — AMINO AC/PROTEIN HYDR/WHEY PRO 10G-100/30
1 LIQUID (ML) ORAL 2 TIMES DAILY
Status: DISCONTINUED | OUTPATIENT
Start: 2019-01-01 | End: 2019-01-01

## 2019-01-01 RX ORDER — SODIUM CHLORIDE 9 MG/ML
INJECTION, SOLUTION INTRAVENOUS CONTINUOUS
Status: DISCONTINUED | OUTPATIENT
Start: 2019-01-01 | End: 2019-01-01 | Stop reason: HOSPADM

## 2019-01-01 RX ORDER — HYDROMORPHONE HYDROCHLORIDE 1 MG/ML
INJECTION, SOLUTION INTRAMUSCULAR; INTRAVENOUS; SUBCUTANEOUS
Status: COMPLETED
Start: 2019-01-01 | End: 2019-01-01

## 2019-01-01 RX ORDER — HEPARIN SODIUM 1000 [USP'U]/ML
500 INJECTION, SOLUTION INTRAVENOUS; SUBCUTANEOUS
Status: DISCONTINUED | OUTPATIENT
Start: 2019-01-01 | End: 2019-01-01

## 2019-01-01 RX ORDER — DEXMEDETOMIDINE HYDROCHLORIDE 4 UG/ML
.2-1.2 INJECTION, SOLUTION INTRAVENOUS CONTINUOUS
Status: DISCONTINUED | OUTPATIENT
Start: 2019-01-01 | End: 2019-01-01

## 2019-01-01 RX ORDER — DEXTROSE MONOHYDRATE 25 G/50ML
25-50 INJECTION, SOLUTION INTRAVENOUS
Status: DISCONTINUED | OUTPATIENT
Start: 2019-01-01 | End: 2019-01-01 | Stop reason: HOSPADM

## 2019-01-01 RX ORDER — NALOXONE HYDROCHLORIDE 0.4 MG/ML
.1-.4 INJECTION, SOLUTION INTRAMUSCULAR; INTRAVENOUS; SUBCUTANEOUS
Status: DISCONTINUED | OUTPATIENT
Start: 2019-01-01 | End: 2019-01-01 | Stop reason: HOSPADM

## 2019-01-01 RX ORDER — HEPARIN SODIUM 5000 [USP'U]/.5ML
5000 INJECTION, SOLUTION INTRAVENOUS; SUBCUTANEOUS EVERY 8 HOURS
Status: DISCONTINUED | OUTPATIENT
Start: 2019-01-01 | End: 2019-01-01

## 2019-01-01 RX ORDER — HYDROMORPHONE HYDROCHLORIDE 1 MG/ML
.5-1 INJECTION, SOLUTION INTRAMUSCULAR; INTRAVENOUS; SUBCUTANEOUS
Status: DISCONTINUED | OUTPATIENT
Start: 2019-01-01 | End: 2019-01-01

## 2019-01-01 RX ORDER — HEPARIN SODIUM 1000 [USP'U]/ML
500 INJECTION, SOLUTION INTRAVENOUS; SUBCUTANEOUS CONTINUOUS
Status: DISCONTINUED | OUTPATIENT
Start: 2019-01-01 | End: 2019-01-01

## 2019-01-01 RX ORDER — LEVOFLOXACIN 5 MG/ML
750 INJECTION, SOLUTION INTRAVENOUS EVERY 24 HOURS
Status: DISCONTINUED | OUTPATIENT
Start: 2019-01-01 | End: 2019-01-01

## 2019-01-01 RX ORDER — ALBUTEROL SULFATE 0.83 MG/ML
2.5 SOLUTION RESPIRATORY (INHALATION)
Status: DISCONTINUED | OUTPATIENT
Start: 2019-01-01 | End: 2019-01-01 | Stop reason: HOSPADM

## 2019-01-01 RX ORDER — POLYETHYLENE GLYCOL 3350 17 G/17G
17 POWDER, FOR SOLUTION ORAL DAILY PRN
Status: DISCONTINUED | OUTPATIENT
Start: 2019-01-01 | End: 2019-01-01 | Stop reason: HOSPADM

## 2019-01-01 RX ORDER — GLYCOPYRROLATE 0.2 MG/ML
0.2 INJECTION, SOLUTION INTRAMUSCULAR; INTRAVENOUS ONCE
Status: DISCONTINUED | OUTPATIENT
Start: 2019-01-01 | End: 2019-01-01 | Stop reason: HOSPADM

## 2019-01-01 RX ORDER — PROPOFOL 10 MG/ML
10 INJECTION, EMULSION INTRAVENOUS CONTINUOUS
Status: DISCONTINUED | OUTPATIENT
Start: 2019-01-01 | End: 2019-01-01 | Stop reason: HOSPADM

## 2019-01-01 RX ORDER — PROPOFOL 10 MG/ML
10-20 INJECTION, EMULSION INTRAVENOUS EVERY 30 MIN PRN
Status: DISCONTINUED | OUTPATIENT
Start: 2019-01-01 | End: 2019-01-01

## 2019-01-01 RX ORDER — METOPROLOL TARTRATE 1 MG/ML
5 INJECTION, SOLUTION INTRAVENOUS ONCE
Status: COMPLETED | OUTPATIENT
Start: 2019-01-01 | End: 2019-01-01

## 2019-01-01 RX ORDER — LEVETIRACETAM 500 MG/1
500 TABLET ORAL 2 TIMES DAILY
Status: DISCONTINUED | OUTPATIENT
Start: 2019-01-01 | End: 2019-01-01

## 2019-01-01 RX ORDER — CLOPIDOGREL BISULFATE 75 MG/1
75 TABLET ORAL DAILY
Qty: 30 TABLET | Refills: 0 | Status: SHIPPED | OUTPATIENT
Start: 2019-01-01 | End: 2019-02-02

## 2019-01-01 RX ORDER — ONDANSETRON 4 MG/1
4 TABLET, ORALLY DISINTEGRATING ORAL EVERY 6 HOURS PRN
Status: DISCONTINUED | OUTPATIENT
Start: 2019-01-01 | End: 2019-01-01 | Stop reason: HOSPADM

## 2019-01-01 RX ORDER — FENTANYL CITRATE 50 UG/ML
25-50 INJECTION, SOLUTION INTRAMUSCULAR; INTRAVENOUS
Status: DISCONTINUED | OUTPATIENT
Start: 2019-01-01 | End: 2019-01-01 | Stop reason: HOSPADM

## 2019-01-01 RX ORDER — ALBUTEROL SULFATE 0.83 MG/ML
SOLUTION RESPIRATORY (INHALATION)
Status: DISCONTINUED
Start: 2019-01-01 | End: 2019-01-01 | Stop reason: HOSPADM

## 2019-01-01 RX ORDER — ACETAMINOPHEN 325 MG/1
650 TABLET ORAL EVERY 6 HOURS PRN
COMMUNITY
Start: 2019-01-01 | End: 2019-02-01

## 2019-01-01 RX ORDER — NICOTINE POLACRILEX 4 MG
15-30 LOZENGE BUCCAL
Status: DISCONTINUED | OUTPATIENT
Start: 2019-01-01 | End: 2019-01-01

## 2019-01-01 RX ORDER — MAGNESIUM SULFATE HEPTAHYDRATE 40 MG/ML
4 INJECTION, SOLUTION INTRAVENOUS EVERY 4 HOURS PRN
Status: DISCONTINUED | OUTPATIENT
Start: 2019-01-01 | End: 2019-01-01 | Stop reason: HOSPADM

## 2019-01-01 RX ORDER — ONDANSETRON 2 MG/ML
4 INJECTION INTRAMUSCULAR; INTRAVENOUS EVERY 6 HOURS PRN
Status: DISCONTINUED | OUTPATIENT
Start: 2019-01-01 | End: 2019-01-01 | Stop reason: HOSPADM

## 2019-01-01 RX ORDER — FENTANYL CITRATE 50 UG/ML
50-100 INJECTION, SOLUTION INTRAMUSCULAR; INTRAVENOUS EVERY 30 MIN PRN
Status: DISCONTINUED | OUTPATIENT
Start: 2019-01-01 | End: 2019-01-01 | Stop reason: HOSPADM

## 2019-01-01 RX ORDER — ACETAMINOPHEN 325 MG/1
325 TABLET ORAL EVERY 4 HOURS PRN
Status: DISCONTINUED | OUTPATIENT
Start: 2019-01-01 | End: 2019-01-01 | Stop reason: HOSPADM

## 2019-01-01 RX ORDER — METOPROLOL SUCCINATE 25 MG/1
25 TABLET, EXTENDED RELEASE ORAL 2 TIMES DAILY
Qty: 30 TABLET | Refills: 0 | COMMUNITY
Start: 2019-01-01

## 2019-01-01 RX ORDER — ATROPINE SULFATE 0.1 MG/ML
INJECTION INTRAVENOUS
Status: DISPENSED
Start: 2019-01-01 | End: 2019-01-01

## 2019-01-01 RX ORDER — MEROPENEM 500 MG/1
500 INJECTION, POWDER, FOR SOLUTION INTRAVENOUS EVERY 24 HOURS
Status: DISCONTINUED | OUTPATIENT
Start: 2019-01-01 | End: 2019-01-01

## 2019-01-01 RX ORDER — METHYLPREDNISOLONE SODIUM SUCCINATE 125 MG/2ML
125 INJECTION, POWDER, LYOPHILIZED, FOR SOLUTION INTRAMUSCULAR; INTRAVENOUS EVERY 24 HOURS
Status: DISCONTINUED | OUTPATIENT
Start: 2019-01-01 | End: 2019-01-01

## 2019-01-01 RX ORDER — PIPERACILLIN SODIUM, TAZOBACTAM SODIUM 2; .25 G/10ML; G/10ML
2.25 INJECTION, POWDER, LYOPHILIZED, FOR SOLUTION INTRAVENOUS ONCE
Status: COMPLETED | OUTPATIENT
Start: 2019-01-01 | End: 2019-01-01

## 2019-01-01 RX ORDER — FENTANYL CITRATE 50 UG/ML
50-100 INJECTION, SOLUTION INTRAMUSCULAR; INTRAVENOUS EVERY 10 MIN PRN
Status: DISCONTINUED | OUTPATIENT
Start: 2019-01-01 | End: 2019-01-01 | Stop reason: HOSPADM

## 2019-01-01 RX ORDER — POLYETHYLENE GLYCOL 3350 17 G/17G
17 POWDER, FOR SOLUTION ORAL DAILY PRN
Status: DISCONTINUED | OUTPATIENT
Start: 2019-01-01 | End: 2019-01-01

## 2019-01-01 RX ORDER — ALBUMIN (HUMAN) 12.5 G/50ML
50 SOLUTION INTRAVENOUS
Status: DISCONTINUED | OUTPATIENT
Start: 2019-01-01 | End: 2019-01-01

## 2019-01-01 RX ORDER — NOREPINEPHRINE BITARTRATE/D5W 16MG/250ML
0.03-0.4 PLASTIC BAG, INJECTION (ML) INTRAVENOUS CONTINUOUS
Status: DISCONTINUED | OUTPATIENT
Start: 2019-01-01 | End: 2019-01-01

## 2019-01-01 RX ORDER — PROPOFOL 10 MG/ML
INJECTION, EMULSION INTRAVENOUS
Status: COMPLETED
Start: 2019-01-01 | End: 2019-01-01

## 2019-01-01 RX ORDER — CEFAZOLIN SODIUM 1 G/50ML
1250 SOLUTION INTRAVENOUS EVERY 24 HOURS
Status: DISCONTINUED | OUTPATIENT
Start: 2019-01-01 | End: 2019-01-01

## 2019-01-01 RX ORDER — ETOMIDATE 2 MG/ML
20 INJECTION INTRAVENOUS ONCE
Status: COMPLETED | OUTPATIENT
Start: 2019-01-01 | End: 2019-01-01

## 2019-01-01 RX ORDER — PIPERACILLIN SODIUM, TAZOBACTAM SODIUM 2; .25 G/10ML; G/10ML
2.25 INJECTION, POWDER, LYOPHILIZED, FOR SOLUTION INTRAVENOUS EVERY 6 HOURS
Status: DISCONTINUED | OUTPATIENT
Start: 2019-01-01 | End: 2019-01-01

## 2019-01-01 RX ORDER — MAGNESIUM SULFATE HEPTAHYDRATE 40 MG/ML
2 INJECTION, SOLUTION INTRAVENOUS EVERY 6 HOURS PRN
Status: DISCONTINUED | OUTPATIENT
Start: 2019-01-01 | End: 2019-01-01 | Stop reason: HOSPADM

## 2019-01-01 RX ADMIN — AZITHROMYCIN 500 MG: 500 INJECTION, POWDER, LYOPHILIZED, FOR SOLUTION INTRAVENOUS at 01:57

## 2019-01-01 RX ADMIN — INSULIN ASPART 1 UNITS: 100 INJECTION, SOLUTION INTRAVENOUS; SUBCUTANEOUS at 15:51

## 2019-01-01 RX ADMIN — ALBUMIN HUMAN 12.5 G: 0.05 INJECTION, SOLUTION INTRAVENOUS at 02:48

## 2019-01-01 RX ADMIN — EPOETIN ALFA 10000 UNITS: 10000 SOLUTION INTRAVENOUS; SUBCUTANEOUS at 10:54

## 2019-01-01 RX ADMIN — FENTANYL CITRATE 100 MCG: 50 INJECTION INTRAMUSCULAR; INTRAVENOUS at 23:06

## 2019-01-01 RX ADMIN — PROPOFOL 45 MCG/KG/MIN: 10 INJECTION, EMULSION INTRAVENOUS at 04:30

## 2019-01-01 RX ADMIN — ETOMIDATE 20 MG: 2 INJECTION INTRAVENOUS at 13:04

## 2019-01-01 RX ADMIN — Medication 5 ML: at 12:47

## 2019-01-01 RX ADMIN — IPRATROPIUM BROMIDE 0.5 MG: 0.5 SOLUTION RESPIRATORY (INHALATION) at 05:02

## 2019-01-01 RX ADMIN — SODIUM CHLORIDE: 9 INJECTION, SOLUTION INTRAVENOUS at 01:00

## 2019-01-01 RX ADMIN — METOPROLOL SUCCINATE 25 MG: 25 TABLET, EXTENDED RELEASE ORAL at 08:48

## 2019-01-01 RX ADMIN — IPRATROPIUM BROMIDE 0.5 MG: 0.5 SOLUTION RESPIRATORY (INHALATION) at 12:03

## 2019-01-01 RX ADMIN — CALCIUM CHLORIDE 1 G: 100 INJECTION, SOLUTION INTRAVENOUS at 06:04

## 2019-01-01 RX ADMIN — PIPERACILLIN SODIUM,TAZOBACTAM SODIUM 4.5 G: 4; .5 INJECTION, POWDER, FOR SOLUTION INTRAVENOUS at 10:08

## 2019-01-01 RX ADMIN — PIPERACILLIN SODIUM,TAZOBACTAM SODIUM 4.5 G: 4; .5 INJECTION, POWDER, FOR SOLUTION INTRAVENOUS at 10:12

## 2019-01-01 RX ADMIN — VANCOMYCIN HYDROCHLORIDE 1250 MG: 10 INJECTION, POWDER, LYOPHILIZED, FOR SOLUTION INTRAVENOUS at 18:29

## 2019-01-01 RX ADMIN — PROPOFOL 15 MCG/KG/MIN: 10 INJECTION, EMULSION INTRAVENOUS at 19:30

## 2019-01-01 RX ADMIN — LISINOPRIL 20 MG: 20 TABLET ORAL at 08:48

## 2019-01-01 RX ADMIN — CALCIUM CHLORIDE, MAGNESIUM CHLORIDE, SODIUM CHLORIDE, SODIUM BICARBONATE, POTASSIUM CHLORIDE AND SODIUM PHOSPHATE DIBASIC DIHYDRATE 12.5 ML/KG/HR: 3.68; 3.05; 6.34; 3.09; .314; .187 INJECTION INTRAVENOUS at 22:56

## 2019-01-01 RX ADMIN — FENTANYL CITRATE 50 MCG: 50 INJECTION INTRAMUSCULAR; INTRAVENOUS at 15:05

## 2019-01-01 RX ADMIN — MAGNESIUM SULFATE HEPTAHYDRATE 2 G: 40 INJECTION, SOLUTION INTRAVENOUS at 11:00

## 2019-01-01 RX ADMIN — CALCIUM CHLORIDE, MAGNESIUM CHLORIDE, SODIUM CHLORIDE, SODIUM BICARBONATE, POTASSIUM CHLORIDE AND SODIUM PHOSPHATE DIBASIC DIHYDRATE 20 ML/KG/HR: 3.68; 3.05; 6.34; 3.09; .314; .187 INJECTION INTRAVENOUS at 14:12

## 2019-01-01 RX ADMIN — Medication 50 MCG/HR: at 03:17

## 2019-01-01 RX ADMIN — ACETAMINOPHEN 325 MG: 325 TABLET, FILM COATED ORAL at 09:28

## 2019-01-01 RX ADMIN — LEVETIRACETAM 500 MG: 500 TABLET ORAL at 07:56

## 2019-01-01 RX ADMIN — VASOPRESSIN 2.4 UNITS/HR: 20 INJECTION INTRAVENOUS at 14:17

## 2019-01-01 RX ADMIN — CALCIUM CHLORIDE, MAGNESIUM CHLORIDE, SODIUM CHLORIDE, SODIUM BICARBONATE, POTASSIUM CHLORIDE AND SODIUM PHOSPHATE DIBASIC DIHYDRATE 12.5 ML/KG/HR: 3.68; 3.05; 6.34; 3.09; .314; .187 INJECTION INTRAVENOUS at 22:59

## 2019-01-01 RX ADMIN — SODIUM CHLORIDE, POTASSIUM CHLORIDE, SODIUM LACTATE AND CALCIUM CHLORIDE 500 ML: 600; 310; 30; 20 INJECTION, SOLUTION INTRAVENOUS at 19:50

## 2019-01-01 RX ADMIN — CALCIUM GLUCONATE 1 G: 98 INJECTION, SOLUTION INTRAVENOUS at 05:37

## 2019-01-01 RX ADMIN — LISINOPRIL 20 MG: 20 TABLET ORAL at 12:37

## 2019-01-01 RX ADMIN — METOPROLOL SUCCINATE 25 MG: 25 TABLET, EXTENDED RELEASE ORAL at 12:37

## 2019-01-01 RX ADMIN — PIPERACILLIN SODIUM,TAZOBACTAM SODIUM 4.5 G: 4; .5 INJECTION, POWDER, FOR SOLUTION INTRAVENOUS at 16:59

## 2019-01-01 RX ADMIN — CALCIUM CHLORIDE, MAGNESIUM CHLORIDE, SODIUM CHLORIDE, SODIUM BICARBONATE, POTASSIUM CHLORIDE AND SODIUM PHOSPHATE DIBASIC DIHYDRATE 12.5 ML/KG/HR: 3.68; 3.05; 6.34; 3.09; .314; .187 INJECTION INTRAVENOUS at 11:05

## 2019-01-01 RX ADMIN — PIPERACILLIN SODIUM,TAZOBACTAM SODIUM 4.5 G: 4; .5 INJECTION, POWDER, FOR SOLUTION INTRAVENOUS at 22:38

## 2019-01-01 RX ADMIN — DOPAMINE HYDROCHLORIDE 2 MCG/KG/MIN: 160 INJECTION, SOLUTION INTRAVENOUS at 04:39

## 2019-01-01 RX ADMIN — VANCOMYCIN HYDROCHLORIDE 1250 MG: 10 INJECTION, POWDER, LYOPHILIZED, FOR SOLUTION INTRAVENOUS at 19:31

## 2019-01-01 RX ADMIN — ALLOPURINOL 100 MG: 100 TABLET ORAL at 12:38

## 2019-01-01 RX ADMIN — Medication 50 MCG/HR: at 08:49

## 2019-01-01 RX ADMIN — Medication 1 PACKET: at 12:46

## 2019-01-01 RX ADMIN — FENTANYL CITRATE 100 MCG: 50 INJECTION INTRAMUSCULAR; INTRAVENOUS at 21:21

## 2019-01-01 RX ADMIN — AMLODIPINE BESYLATE 5 MG: 2.5 TABLET ORAL at 20:58

## 2019-01-01 RX ADMIN — PROPOFOL 30 MCG/KG/MIN: 10 INJECTION, EMULSION INTRAVENOUS at 13:22

## 2019-01-01 RX ADMIN — PROPOFOL 10 MG: 10 INJECTION, EMULSION INTRAVENOUS at 19:32

## 2019-01-01 RX ADMIN — VASOPRESSIN 2.4 UNITS/HR: 20 INJECTION INTRAVENOUS at 11:15

## 2019-01-01 RX ADMIN — CALCIUM CHLORIDE, MAGNESIUM CHLORIDE, SODIUM CHLORIDE, SODIUM BICARBONATE, POTASSIUM CHLORIDE AND SODIUM PHOSPHATE DIBASIC DIHYDRATE 12.5 ML/KG/HR: 3.68; 3.05; 6.34; 3.09; .314; .187 INJECTION INTRAVENOUS at 17:03

## 2019-01-01 RX ADMIN — Medication 1 PACKET: at 19:47

## 2019-01-01 RX ADMIN — IPRATROPIUM BROMIDE 0.5 MG: 0.5 SOLUTION RESPIRATORY (INHALATION) at 12:15

## 2019-01-01 RX ADMIN — Medication 1 PACKET: at 20:01

## 2019-01-01 RX ADMIN — Medication 5000 UNITS: at 13:14

## 2019-01-01 RX ADMIN — ROCURONIUM BROMIDE 50 MG: 10 INJECTION INTRAVENOUS at 13:04

## 2019-01-01 RX ADMIN — Medication 5000 UNITS: at 13:26

## 2019-01-01 RX ADMIN — VASOPRESSIN 2.4 UNITS/HR: 20 INJECTION INTRAVENOUS at 02:38

## 2019-01-01 RX ADMIN — PROPOFOL 10 MG: 10 INJECTION, EMULSION INTRAVENOUS at 14:36

## 2019-01-01 RX ADMIN — IPRATROPIUM BROMIDE 0.5 MG: 0.5 SOLUTION RESPIRATORY (INHALATION) at 00:45

## 2019-01-01 RX ADMIN — SODIUM CHLORIDE 300 ML: 9 INJECTION, SOLUTION INTRAVENOUS at 16:57

## 2019-01-01 RX ADMIN — Medication 1 PACKET: at 07:39

## 2019-01-01 RX ADMIN — HYDROMORPHONE HYDROCHLORIDE 0.5 MG: 1 INJECTION, SOLUTION INTRAMUSCULAR; INTRAVENOUS; SUBCUTANEOUS at 23:18

## 2019-01-01 RX ADMIN — PROPOFOL 15 MG: 10 INJECTION, EMULSION INTRAVENOUS at 04:10

## 2019-01-01 RX ADMIN — SODIUM CHLORIDE, POTASSIUM CHLORIDE, SODIUM LACTATE AND CALCIUM CHLORIDE 250 ML: 600; 310; 30; 20 INJECTION, SOLUTION INTRAVENOUS at 03:01

## 2019-01-01 RX ADMIN — INSULIN ASPART 2 UNITS: 100 INJECTION, SOLUTION INTRAVENOUS; SUBCUTANEOUS at 07:52

## 2019-01-01 RX ADMIN — LEVETIRACETAM 500 MG: 500 TABLET ORAL at 07:39

## 2019-01-01 RX ADMIN — PIPERACILLIN SODIUM,TAZOBACTAM SODIUM 4.5 G: 4; .5 INJECTION, POWDER, FOR SOLUTION INTRAVENOUS at 10:34

## 2019-01-01 RX ADMIN — LEVETIRACETAM 500 MG: 500 TABLET, FILM COATED ORAL at 08:48

## 2019-01-01 RX ADMIN — DOPAMINE HYDROCHLORIDE 20 MCG/KG/MIN: 160 INJECTION, SOLUTION INTRAVENOUS at 10:22

## 2019-01-01 RX ADMIN — LEVOFLOXACIN 750 MG: 5 INJECTION, SOLUTION INTRAVENOUS at 13:14

## 2019-01-01 RX ADMIN — VASOPRESSIN 2.4 UNITS/HR: 20 INJECTION INTRAVENOUS at 11:13

## 2019-01-01 RX ADMIN — Medication 5000 UNITS: at 22:56

## 2019-01-01 RX ADMIN — PIPERACILLIN SODIUM,TAZOBACTAM SODIUM 2.25 G: 2; .25 INJECTION, POWDER, FOR SOLUTION INTRAVENOUS at 21:40

## 2019-01-01 RX ADMIN — FENTANYL CITRATE 50 MCG: 50 INJECTION INTRAMUSCULAR; INTRAVENOUS at 17:01

## 2019-01-01 RX ADMIN — IPRATROPIUM BROMIDE 0.5 MG: 0.5 SOLUTION RESPIRATORY (INHALATION) at 20:15

## 2019-01-01 RX ADMIN — METOPROLOL TARTRATE 5 MG: 5 INJECTION, SOLUTION INTRAVENOUS at 09:51

## 2019-01-01 RX ADMIN — CALCIUM CHLORIDE, MAGNESIUM CHLORIDE, SODIUM CHLORIDE, SODIUM BICARBONATE, POTASSIUM CHLORIDE AND SODIUM PHOSPHATE DIBASIC DIHYDRATE 12.5 ML/KG/HR: 3.68; 3.05; 6.34; 3.09; .314; .187 INJECTION INTRAVENOUS at 23:33

## 2019-01-01 RX ADMIN — INSULIN ASPART 1 UNITS: 100 INJECTION, SOLUTION INTRAVENOUS; SUBCUTANEOUS at 04:24

## 2019-01-01 RX ADMIN — MEROPENEM 500 MG: 500 INJECTION, POWDER, FOR SOLUTION INTRAVENOUS at 07:06

## 2019-01-01 RX ADMIN — CALCIUM CHLORIDE, MAGNESIUM CHLORIDE, SODIUM CHLORIDE, SODIUM BICARBONATE, POTASSIUM CHLORIDE AND SODIUM PHOSPHATE DIBASIC DIHYDRATE 20 ML/KG/HR: 3.68; 3.05; 6.34; 3.09; .314; .187 INJECTION INTRAVENOUS at 10:03

## 2019-01-01 RX ADMIN — CALCIUM CHLORIDE, MAGNESIUM CHLORIDE, SODIUM CHLORIDE, SODIUM BICARBONATE, POTASSIUM CHLORIDE AND SODIUM PHOSPHATE DIBASIC DIHYDRATE 12.5 ML/KG/HR: 3.68; 3.05; 6.34; 3.09; .314; .187 INJECTION INTRAVENOUS at 17:38

## 2019-01-01 RX ADMIN — PROPOFOL 35 MCG/KG/MIN: 10 INJECTION, EMULSION INTRAVENOUS at 14:12

## 2019-01-01 RX ADMIN — CALCIUM CHLORIDE, MAGNESIUM CHLORIDE, SODIUM CHLORIDE, SODIUM BICARBONATE, POTASSIUM CHLORIDE AND SODIUM PHOSPHATE DIBASIC DIHYDRATE 12.5 ML/KG/HR: 3.68; 3.05; 6.34; 3.09; .314; .187 INJECTION INTRAVENOUS at 10:38

## 2019-01-01 RX ADMIN — Medication 5000 UNITS: at 05:43

## 2019-01-01 RX ADMIN — Medication 5000 UNITS: at 05:44

## 2019-01-01 RX ADMIN — IPRATROPIUM BROMIDE 0.5 MG: 0.5 SOLUTION RESPIRATORY (INHALATION) at 08:34

## 2019-01-01 RX ADMIN — HYDROCORTISONE SODIUM SUCCINATE 50 MG: 100 INJECTION, POWDER, FOR SOLUTION INTRAMUSCULAR; INTRAVENOUS at 15:50

## 2019-01-01 RX ADMIN — SODIUM CHLORIDE 250 ML: 9 INJECTION, SOLUTION INTRAVENOUS at 16:58

## 2019-01-01 RX ADMIN — PIPERACILLIN SODIUM,TAZOBACTAM SODIUM 4.5 G: 4; .5 INJECTION, POWDER, FOR SOLUTION INTRAVENOUS at 16:37

## 2019-01-01 RX ADMIN — PIPERACILLIN SODIUM AND TAZOBACTAM SODIUM 2.25 G: .25; 2 INJECTION, POWDER, LYOPHILIZED, FOR SOLUTION INTRAVENOUS at 14:27

## 2019-01-01 RX ADMIN — CALCIUM CHLORIDE, MAGNESIUM CHLORIDE, SODIUM CHLORIDE, SODIUM BICARBONATE, POTASSIUM CHLORIDE AND SODIUM PHOSPHATE DIBASIC DIHYDRATE 12.5 ML/KG/HR: 3.68; 3.05; 6.34; 3.09; .314; .187 INJECTION INTRAVENOUS at 11:22

## 2019-01-01 RX ADMIN — PROPOFOL 25 MCG/KG/MIN: 10 INJECTION, EMULSION INTRAVENOUS at 23:52

## 2019-01-01 RX ADMIN — PROPOFOL 45 MCG/KG/MIN: 10 INJECTION, EMULSION INTRAVENOUS at 09:36

## 2019-01-01 RX ADMIN — SODIUM CHLORIDE, POTASSIUM CHLORIDE, SODIUM LACTATE AND CALCIUM CHLORIDE 500 ML: 600; 310; 30; 20 INJECTION, SOLUTION INTRAVENOUS at 05:57

## 2019-01-01 RX ADMIN — Medication 0.18 MCG/KG/MIN: at 15:57

## 2019-01-01 RX ADMIN — PROPOFOL 10 MG: 10 INJECTION, EMULSION INTRAVENOUS at 21:01

## 2019-01-01 RX ADMIN — Medication: at 16:58

## 2019-01-01 RX ADMIN — INSULIN ASPART 2 UNITS: 100 INJECTION, SOLUTION INTRAVENOUS; SUBCUTANEOUS at 03:56

## 2019-01-01 RX ADMIN — CALCIUM CHLORIDE, MAGNESIUM CHLORIDE, SODIUM CHLORIDE, SODIUM BICARBONATE, POTASSIUM CHLORIDE AND SODIUM PHOSPHATE DIBASIC DIHYDRATE 12.5 ML/KG/HR: 3.68; 3.05; 6.34; 3.09; .314; .187 INJECTION INTRAVENOUS at 05:11

## 2019-01-01 RX ADMIN — LIDOCAINE HYDROCHLORIDE 50 MG: 10 INJECTION, SOLUTION EPIDURAL; INFILTRATION; INTRACAUDAL; PERINEURAL at 16:39

## 2019-01-01 RX ADMIN — CALCIUM CHLORIDE, MAGNESIUM CHLORIDE, SODIUM CHLORIDE, SODIUM BICARBONATE, POTASSIUM CHLORIDE AND SODIUM PHOSPHATE DIBASIC DIHYDRATE 12.5 ML/KG/HR: 3.68; 3.05; 6.34; 3.09; .314; .187 INJECTION INTRAVENOUS at 04:45

## 2019-01-01 RX ADMIN — CLOPIDOGREL BISULFATE 75 MG: 75 TABLET, FILM COATED ORAL at 08:08

## 2019-01-01 RX ADMIN — IPRATROPIUM BROMIDE 0.5 MG: 0.5 SOLUTION RESPIRATORY (INHALATION) at 20:52

## 2019-01-01 RX ADMIN — CALCIUM CHLORIDE 1 G: 100 INJECTION, SOLUTION INTRAVENOUS at 08:19

## 2019-01-01 RX ADMIN — Medication 5 ML: at 07:40

## 2019-01-01 RX ADMIN — PANTOPRAZOLE SODIUM 40 MG: 40 TABLET, DELAYED RELEASE ORAL at 07:40

## 2019-01-01 RX ADMIN — CALCIUM CHLORIDE, MAGNESIUM CHLORIDE, SODIUM CHLORIDE, SODIUM BICARBONATE, POTASSIUM CHLORIDE AND SODIUM PHOSPHATE DIBASIC DIHYDRATE 12.5 ML/KG/HR: 3.68; 3.05; 6.34; 3.09; .314; .187 INJECTION INTRAVENOUS at 17:36

## 2019-01-01 RX ADMIN — CALCIUM CHLORIDE, MAGNESIUM CHLORIDE, SODIUM CHLORIDE, SODIUM BICARBONATE, POTASSIUM CHLORIDE AND SODIUM PHOSPHATE DIBASIC DIHYDRATE 20 ML/KG/HR: 3.68; 3.05; 6.34; 3.09; .314; .187 INJECTION INTRAVENOUS at 10:13

## 2019-01-01 RX ADMIN — CALCIUM CHLORIDE, MAGNESIUM CHLORIDE, SODIUM CHLORIDE, SODIUM BICARBONATE, POTASSIUM CHLORIDE AND SODIUM PHOSPHATE DIBASIC DIHYDRATE 20 ML/KG/HR: 3.68; 3.05; 6.34; 3.09; .314; .187 INJECTION INTRAVENOUS at 18:33

## 2019-01-01 RX ADMIN — HYDROCORTISONE SODIUM SUCCINATE 50 MG: 100 INJECTION, POWDER, FOR SOLUTION INTRAMUSCULAR; INTRAVENOUS at 22:56

## 2019-01-01 RX ADMIN — FENTANYL CITRATE 100 MCG: 50 INJECTION INTRAMUSCULAR; INTRAVENOUS at 23:45

## 2019-01-01 RX ADMIN — HYDROCORTISONE SODIUM SUCCINATE 50 MG: 100 INJECTION, POWDER, FOR SOLUTION INTRAMUSCULAR; INTRAVENOUS at 09:38

## 2019-01-01 RX ADMIN — ALLOPURINOL 100 MG: 100 TABLET ORAL at 08:48

## 2019-01-01 RX ADMIN — PROPOFOL 10 MG: 10 INJECTION, EMULSION INTRAVENOUS at 08:21

## 2019-01-01 RX ADMIN — FENTANYL CITRATE 50 MCG: 50 INJECTION INTRAMUSCULAR; INTRAVENOUS at 16:21

## 2019-01-01 RX ADMIN — INSULIN ASPART 1 UNITS: 100 INJECTION, SOLUTION INTRAVENOUS; SUBCUTANEOUS at 19:56

## 2019-01-01 RX ADMIN — CALCIUM CHLORIDE, MAGNESIUM CHLORIDE, SODIUM CHLORIDE, SODIUM BICARBONATE, POTASSIUM CHLORIDE AND SODIUM PHOSPHATE DIBASIC DIHYDRATE 3.24 ML/KG/HR: 3.68; 3.05; 6.34; 3.09; .314; .187 INJECTION INTRAVENOUS at 10:38

## 2019-01-01 RX ADMIN — INSULIN ASPART 1 UNITS: 100 INJECTION, SOLUTION INTRAVENOUS; SUBCUTANEOUS at 00:17

## 2019-01-01 RX ADMIN — CLOPIDOGREL BISULFATE 75 MG: 75 TABLET, FILM COATED ORAL at 08:48

## 2019-01-01 RX ADMIN — CALCIUM CHLORIDE, MAGNESIUM CHLORIDE, SODIUM CHLORIDE, SODIUM BICARBONATE, POTASSIUM CHLORIDE AND SODIUM PHOSPHATE DIBASIC DIHYDRATE 20 ML/KG/HR: 3.68; 3.05; 6.34; 3.09; .314; .187 INJECTION INTRAVENOUS at 22:41

## 2019-01-01 RX ADMIN — IPRATROPIUM BROMIDE 0.5 MG: 0.5 SOLUTION RESPIRATORY (INHALATION) at 08:18

## 2019-01-01 RX ADMIN — VASOPRESSIN 2.4 UNITS/HR: 20 INJECTION INTRAVENOUS at 00:34

## 2019-01-01 RX ADMIN — PROPOFOL 35 MCG/KG/MIN: 10 INJECTION, EMULSION INTRAVENOUS at 07:33

## 2019-01-01 RX ADMIN — Medication 50 MCG/HR: at 01:26

## 2019-01-01 RX ADMIN — CALCIUM CHLORIDE 2 G: 100 INJECTION, SOLUTION INTRAVENOUS at 12:20

## 2019-01-01 RX ADMIN — OMEPRAZOLE 20 MG: 20 CAPSULE, DELAYED RELEASE ORAL at 08:08

## 2019-01-01 RX ADMIN — Medication 0.3 MCG/KG/MIN: at 22:48

## 2019-01-01 RX ADMIN — CALCIUM CHLORIDE, MAGNESIUM CHLORIDE, SODIUM CHLORIDE, SODIUM BICARBONATE, POTASSIUM CHLORIDE AND SODIUM PHOSPHATE DIBASIC DIHYDRATE 3.24 ML/KG/HR: 3.68; 3.05; 6.34; 3.09; .314; .187 INJECTION INTRAVENOUS at 12:48

## 2019-01-01 RX ADMIN — CALCIUM CHLORIDE, MAGNESIUM CHLORIDE, SODIUM CHLORIDE, SODIUM BICARBONATE, POTASSIUM CHLORIDE AND SODIUM PHOSPHATE DIBASIC DIHYDRATE 12.5 ML/KG/HR: 3.68; 3.05; 6.34; 3.09; .314; .187 INJECTION INTRAVENOUS at 10:37

## 2019-01-01 RX ADMIN — FENTANYL CITRATE 25 MCG: 50 INJECTION INTRAMUSCULAR; INTRAVENOUS at 16:44

## 2019-01-01 RX ADMIN — ACETAMINOPHEN 650 MG: 325 TABLET, FILM COATED ORAL at 21:23

## 2019-01-01 RX ADMIN — CALCIUM ACETATE 1334 MG: 667 CAPSULE ORAL at 17:11

## 2019-01-01 RX ADMIN — LEVETIRACETAM 500 MG: 500 TABLET ORAL at 19:47

## 2019-01-01 RX ADMIN — AMLODIPINE BESYLATE 5 MG: 2.5 TABLET ORAL at 08:48

## 2019-01-01 RX ADMIN — HYDROCORTISONE SODIUM SUCCINATE 50 MG: 100 INJECTION, POWDER, FOR SOLUTION INTRAMUSCULAR; INTRAVENOUS at 10:02

## 2019-01-01 RX ADMIN — CALCIUM ACETATE 1334 MG: 667 CAPSULE ORAL at 08:48

## 2019-01-01 RX ADMIN — PIPERACILLIN SODIUM,TAZOBACTAM SODIUM 4.5 G: 4; .5 INJECTION, POWDER, FOR SOLUTION INTRAVENOUS at 04:24

## 2019-01-01 RX ADMIN — INSULIN ASPART 1 UNITS: 100 INJECTION, SOLUTION INTRAVENOUS; SUBCUTANEOUS at 07:40

## 2019-01-01 RX ADMIN — Medication 0.03 MCG/KG/MIN: at 00:27

## 2019-01-01 RX ADMIN — PIPERACILLIN SODIUM,TAZOBACTAM SODIUM 4.5 G: 4; .5 INJECTION, POWDER, FOR SOLUTION INTRAVENOUS at 04:36

## 2019-01-01 RX ADMIN — IPRATROPIUM BROMIDE AND ALBUTEROL SULFATE 3 ML: .5; 2.5 SOLUTION RESPIRATORY (INHALATION) at 18:31

## 2019-01-01 RX ADMIN — MAGNESIUM SULFATE HEPTAHYDRATE 2 G: 40 INJECTION, SOLUTION INTRAVENOUS at 18:05

## 2019-01-01 RX ADMIN — IPRATROPIUM BROMIDE 0.5 MG: 0.5 SOLUTION RESPIRATORY (INHALATION) at 15:52

## 2019-01-01 RX ADMIN — INSULIN ASPART 1 UNITS: 100 INJECTION, SOLUTION INTRAVENOUS; SUBCUTANEOUS at 17:14

## 2019-01-01 RX ADMIN — CALCIUM ACETATE 1334 MG: 667 CAPSULE ORAL at 12:37

## 2019-01-01 RX ADMIN — PROPOFOL 20 MCG/KG/MIN: 10 INJECTION, EMULSION INTRAVENOUS at 14:17

## 2019-01-01 RX ADMIN — INSULIN ASPART 1 UNITS: 100 INJECTION, SOLUTION INTRAVENOUS; SUBCUTANEOUS at 08:53

## 2019-01-01 RX ADMIN — CALCIUM CHLORIDE, MAGNESIUM CHLORIDE, SODIUM CHLORIDE, SODIUM BICARBONATE, POTASSIUM CHLORIDE AND SODIUM PHOSPHATE DIBASIC DIHYDRATE 12.5 ML/KG/HR: 3.68; 3.05; 6.34; 3.09; .314; .187 INJECTION INTRAVENOUS at 04:43

## 2019-01-01 RX ADMIN — VANCOMYCIN HYDROCHLORIDE 1500 MG: 10 INJECTION, POWDER, LYOPHILIZED, FOR SOLUTION INTRAVENOUS at 20:00

## 2019-01-01 RX ADMIN — DOXERCALCIFEROL 4 MCG: 4 INJECTION, SOLUTION INTRAVENOUS at 10:54

## 2019-01-01 RX ADMIN — Medication 0.2 MCG/KG/HR: at 22:55

## 2019-01-01 RX ADMIN — FENTANYL CITRATE 50 MCG: 50 INJECTION INTRAMUSCULAR; INTRAVENOUS at 05:39

## 2019-01-01 RX ADMIN — IPRATROPIUM BROMIDE 0.5 MG: 0.5 SOLUTION RESPIRATORY (INHALATION) at 16:28

## 2019-01-01 RX ADMIN — LEVOFLOXACIN 750 MG: 5 INJECTION, SOLUTION INTRAVENOUS at 13:26

## 2019-01-01 RX ADMIN — DOPAMINE HYDROCHLORIDE 10 MCG/KG/MIN: 160 INJECTION, SOLUTION INTRAVENOUS at 18:57

## 2019-01-01 RX ADMIN — LEVETIRACETAM 500 MG: 500 TABLET ORAL at 07:40

## 2019-01-01 RX ADMIN — HYDROCORTISONE SODIUM SUCCINATE 50 MG: 100 INJECTION, POWDER, FOR SOLUTION INTRAMUSCULAR; INTRAVENOUS at 04:12

## 2019-01-01 RX ADMIN — EPINEPHRINE 0.03 MCG/KG/MIN: 1 INJECTION PARENTERAL at 03:09

## 2019-01-01 RX ADMIN — FENTANYL CITRATE 50 MCG: 50 INJECTION INTRAMUSCULAR; INTRAVENOUS at 20:54

## 2019-01-01 RX ADMIN — SODIUM CHLORIDE, POTASSIUM CHLORIDE, SODIUM LACTATE AND CALCIUM CHLORIDE 500 ML: 600; 310; 30; 20 INJECTION, SOLUTION INTRAVENOUS at 01:29

## 2019-01-01 RX ADMIN — AMLODIPINE BESYLATE 5 MG: 2.5 TABLET ORAL at 12:38

## 2019-01-01 RX ADMIN — OMEPRAZOLE 20 MG: 20 CAPSULE, DELAYED RELEASE ORAL at 08:50

## 2019-01-01 RX ADMIN — PANTOPRAZOLE SODIUM 40 MG: 40 TABLET, DELAYED RELEASE ORAL at 07:56

## 2019-01-01 RX ADMIN — Medication 5000 UNITS: at 06:19

## 2019-01-01 RX ADMIN — LEVETIRACETAM 500 MG: 500 TABLET ORAL at 20:01

## 2019-01-01 RX ADMIN — PIPERACILLIN SODIUM,TAZOBACTAM SODIUM 4.5 G: 4; .5 INJECTION, POWDER, FOR SOLUTION INTRAVENOUS at 22:56

## 2019-01-01 RX ADMIN — CALCIUM ACETATE 1334 MG: 667 CAPSULE ORAL at 11:53

## 2019-01-01 RX ADMIN — FENTANYL CITRATE 50 MCG: 50 INJECTION INTRAMUSCULAR; INTRAVENOUS at 03:20

## 2019-01-01 RX ADMIN — LEVETIRACETAM 500 MG: 500 TABLET, FILM COATED ORAL at 12:38

## 2019-01-01 RX ADMIN — INSULIN GLARGINE 5 UNITS: 100 INJECTION, SOLUTION SUBCUTANEOUS at 21:23

## 2019-01-01 RX ADMIN — CALCIUM CHLORIDE, MAGNESIUM CHLORIDE, SODIUM CHLORIDE, SODIUM BICARBONATE, POTASSIUM CHLORIDE AND SODIUM PHOSPHATE DIBASIC DIHYDRATE 20 ML/KG/HR: 3.68; 3.05; 6.34; 3.09; .314; .187 INJECTION INTRAVENOUS at 05:51

## 2019-01-01 RX ADMIN — CALCIUM CHLORIDE, MAGNESIUM CHLORIDE, SODIUM CHLORIDE, SODIUM BICARBONATE, POTASSIUM CHLORIDE AND SODIUM PHOSPHATE DIBASIC DIHYDRATE 20 ML/KG/HR: 3.68; 3.05; 6.34; 3.09; .314; .187 INJECTION INTRAVENOUS at 02:27

## 2019-01-01 RX ADMIN — Medication 5000 UNITS: at 22:38

## 2019-01-01 RX ADMIN — Medication 5000 UNITS: at 21:10

## 2019-01-01 RX ADMIN — PIPERACILLIN SODIUM,TAZOBACTAM SODIUM 2.25 G: 2; .25 INJECTION, POWDER, FOR SOLUTION INTRAVENOUS at 03:02

## 2019-01-01 RX ADMIN — INSULIN ASPART 1 UNITS: 100 INJECTION, SOLUTION INTRAVENOUS; SUBCUTANEOUS at 16:04

## 2019-01-01 ASSESSMENT — ACTIVITIES OF DAILY LIVING (ADL)
ADLS_ACUITY_SCORE: 24
ADLS_ACUITY_SCORE: 22
ADLS_ACUITY_SCORE: 28
ADLS_ACUITY_SCORE: 24
ADLS_ACUITY_SCORE: 28
ADLS_ACUITY_SCORE: 24
ADLS_ACUITY_SCORE: 28
ADLS_ACUITY_SCORE: 24
ADLS_ACUITY_SCORE: 25
ADLS_ACUITY_SCORE: 28
ADLS_ACUITY_SCORE: 25
ADLS_ACUITY_SCORE: 24
ADLS_ACUITY_SCORE: 22
ADLS_ACUITY_SCORE: 24
ADLS_ACUITY_SCORE: 24
ADLS_ACUITY_SCORE: 25
ADLS_ACUITY_SCORE: 26
ADLS_ACUITY_SCORE: 24
ADLS_ACUITY_SCORE: 24
ADLS_ACUITY_SCORE: 28
ADLS_ACUITY_SCORE: 24
ADLS_ACUITY_SCORE: 26
ADLS_ACUITY_SCORE: 24
ADLS_ACUITY_SCORE: 28
ADLS_ACUITY_SCORE: 28
ADLS_ACUITY_SCORE: 26
ADLS_ACUITY_SCORE: 24
ADLS_ACUITY_SCORE: 28

## 2019-01-01 ASSESSMENT — MIFFLIN-ST. JEOR
SCORE: 1209.38
SCORE: 1241.38
SCORE: 1209.38

## 2019-01-01 NOTE — PLAN OF CARE
Alert and oriented to self and family only. L/S with expiratory wheezes throughout. LUE graft with bruit. JAIR removed. Right jugular tunneled catheter locked and dressing intact. On chronic hemodialysis. Plan for dialysis in am. Wife at bedside and supportive.

## 2019-01-01 NOTE — PLAN OF CARE
Pt is hmong speaking and  #[id number] was used to communicate with pt spouse and pt this shift.  Per wife, pt is confused at baseline and only intermittently is oriented to self.  Pt uses a wheelchair at baseline but moves around well in bed with minimal assist. Pt denied having pain when asked. Pt vital signs have been stable and O2 sats have been maintained on 2L NC. Will continue to monitor.

## 2019-01-01 NOTE — PROGRESS NOTES
Dialysis in am and then discharge per IM service. F/U at his usual dialysis unit. Orders placed for dialysis in am.

## 2019-01-01 NOTE — PROGRESS NOTES
Vascular Surgery Progress Note    S: Very comfortable.  Wife spent the night with him.    O: No problems with dialysis via right jugular tunneled catheter yesterday.  Vitals:  BP  Min: 113/51  Max: 148/53  Temp  Av.2  F (36.8  C)  Min: 97.4  F (36.3  C)  Max: 99.8  F (37.7  C)  Pulse  Av.5  Min: 66  Max: 69  I/O last 3 completed shifts:  In: 540 [P.O.:320]  Out:  [Drains:25; Other:]    Physical Exam: Improved swelling in left arm.                             Good bruit in HeRO graft.                             Surgical wound=A                            Minimal Lazaro-Arevalo output--removed.      Assessment/Plan: Doing well.  Discharge per nephrology.                                 Dialysis unit can use the HeRO graft in approximately 2 weeks.                                 Discussed with patient's wifewho has good English comprehension.                                  Home on Plavix due to graft.      Wm. Jose L MD

## 2019-01-01 NOTE — PLAN OF CARE
Lethargic, arouses to voice. Denies pain. Left AV fistula dressing CDI. Temp port intact. Dialysis diet, total feed. Appetite fair. Left arm edema 3+, O2 2L, sat 96%. Expiratory wheeze, course throughout bilaterally.

## 2019-01-01 NOTE — PROGRESS NOTES
[facility]    Hospitalist Progress Note      Assessment & Plan   Angle Munguia is a 83 year old male with PMH of HTN, ESRD on HD, DM, gout- admitted for evaluation of malfunctioning dialysis site.    Malfunctioning dialysis fistula  L arm hematoma (large) s/p surgical evacuation  - US UE- Extensive hematoma extending from the axilla to the antecubital  Fossa; HeRO graft was occluded- possibly due to compression from the large hematoma.  - Vas surgery consult appreciated  - CVC right internal jugular tunnel catheter placed on 12/28  - s/p left arm thrombectomy with evacuation of left upper arm hematoma on 12/30/2018; >500cc clot/hematoma per report.  - post op care as per Vasc surgery  - resumed on Plavix  - as per vasc surgery- dialysis unit can use the HeRO graft in approximately 2 weeks    Acute blood loss anemia (due to above)on chronic anemia:    - prior Hb 10-11; Denies rectal bleed/melena/hematemesis  - admitted with Hb 6.7--6.1. Transfused.  - serial Hb now 9--8.2   - Monitor HGB and transfuse as needed.    ESRD on HD (MWF).  - CVC right internal jugular tunnel catheter placed on 12/28  - nephrology following.  - next HD scheduled for tomorrow  - continue PTA Phoslo    Gout: Stable.  - Continue prior to admission allopurinol     Hypertension:   - BP controlled on PTA amlodipine 5 mg po BID, Toprol XL 25 mg po daily, and lisinopril 20 mg po daily  - BP controlled     Diabetes mellitus: Patient maintained on 10 units of Lantus in the evening.  - Insulin sliding scale.  - Cont Lantus at reduced dose 5 units qhs.     GERD: Stable.  - Continue prior to admission omeprazole.      DVT Prophylaxis: Pneumatic Compression Devices  Code Status: Full Code  Expected discharge: Tomorrow, recommended to prior living arrangement once after HD.    Caitlin Arzola MD    Interval History   Doing relatively ok; noted to have some wheezing and some cough; when asked if the he has chest pain or SOB- he said  "\"I dont know\"; discussed with daughter and bedside RN    -Data reviewed today: I reviewed all new labs and imaging results over the last 24 hours. I personally reviewed no images or EKG's today.    Physical Exam   Temp: 98.3  F (36.8  C) Temp src: Oral BP: 121/57 Pulse: 65 Heart Rate: 67 Resp: 18 SpO2: 100 % O2 Device: Nasal cannula Oxygen Delivery: 2 LPM  Vitals:    12/30/18 0615 12/31/18 0452 01/01/19 0639   Weight: 66.9 kg (147 lb 7.8 oz) 65.7 kg (144 lb 13.5 oz) 67.3 kg (148 lb 5.9 oz)     Vital Signs with Ranges  Temp:  [97.4  F (36.3  C)-99.8  F (37.7  C)] 98.3  F (36.8  C)  Pulse:  [65-70] 65  Heart Rate:  [67] 67  Resp:  [16-22] 18  BP: (113-128)/(51-60) 121/57  SpO2:  [98 %-100 %] 100 %  I/O last 3 completed shifts:  In: 520 [P.O.:520]  Out: 15 [Drains:15]    Constitutional: Awake, alert, fatigued   Respiratory: Bilateral air entry, scattered wheezing  Cardiovascular: S1S2, RRR, no murmurs, no rubs  GI: abdomen- soft, nonT, nonD, BS present  Skin/Integumen: no rashes, no cyanosis  Extremities- LUE swelling- improved      Medications     sodium chloride Stopped (12/30/18 1443)       - MEDICATION INSTRUCTIONS for Dialysis Patients -   Does not apply See Admin Instructions     allopurinol  100 mg Oral Daily     amLODIPine  5 mg Oral BID     calcium acetate  1,334 mg Oral TID w/meals     clopidogrel  75 mg Oral Daily     heparin Lock (1000 units/mL High concentration)  3 mL Intracatheter Once     heparin Lock (1000 units/mL High concentration)  3 mL Intracatheter Once     insulin aspart  1-7 Units Subcutaneous TID AC     insulin aspart  1-5 Units Subcutaneous At Bedtime     insulin glargine  5 Units Subcutaneous At Bedtime     levETIRAcetam  500 mg Oral Daily     lisinopril  20 mg Oral Daily     metoprolol succinate ER  25 mg Oral Daily     omeprazole  20 mg Oral QAM     sodium chloride (PF)  3 mL Intracatheter Q8H       Data   Recent Labs   Lab 01/01/19  0736 12/31/18  1152 12/31/18  0800  12/30/18  0902  " 12/29/18  0945 12/28/18  1441  12/26/18  1521   WBC 6.4  --   --   --  6.5  --  5.0  --    < > 6.5   HGB 8.2* 9.0* 6.4*   < > 7.6*  --  7.8* 7.8*   < > 6.7*   MCV 89  --   --   --  89  --  90  --    < > 89     --   --   --  179  --  152 135*   < > 128*   INR  --   --   --   --  1.24*  --   --  1.25*  --   --      --  133  --  134   < >  --   --    < > 133   POTASSIUM 5.6*  --  5.8*  --  5.0   < >  --   --    < > 5.2   CHLORIDE 97  --  98  --  97   < >  --   --    < > 97   CO2 29  --  25  --  27   < >  --   --    < > 22   BUN 40*  --  56*  --  53*   < >  --   --    < > 84*   CR 7.32*  --  8.70*  --  8.47*   < >  --   --    < > 12.40*   ANIONGAP 7  --  10  --  10   < >  --   --    < > 14   SHIRA 8.3*  --  7.9*  --  8.7   < >  --   --    < > 8.2*   *  --  106*  --  104*   < >  --   --    < > 188*   ALBUMIN  --   --  2.5*  --   --   --   --   --   --  3.6   PROTTOTAL  --   --   --   --   --   --   --   --   --  7.8   BILITOTAL  --   --   --   --   --   --   --   --   --  0.7   ALKPHOS  --   --   --   --   --   --   --   --   --  51   ALT  --   --   --   --   --   --   --   --   --  10   AST  --   --   --   --   --   --   --   --   --  8    < > = values in this interval not displayed.       No results found for this or any previous visit (from the past 24 hour(s)).

## 2019-01-02 NOTE — PROGRESS NOTES
Vascular Surgery Progress Note    No acute events overnight, drain out yesterday  Arm swelling remains    B/P: 121/58, T: 97.4, P: 65, R: 18  Alert oriented no acute distress  Left arm with edema, motor intact  + thrill on hero, dressing and incision c/d/i with sutures.    WBC   Date Value Ref Range Status   01/01/2019 6.4 4.0 - 11.0 10e9/L Final   ]  Hemoglobin   Date Value Ref Range Status   01/01/2019 8.2 (L) 13.3 - 17.7 g/dL Final   ]  INR   Date Value Ref Range Status   12/30/2018 1.24 (H) 0.86 - 1.14 Final      Creatinine   Date Value Ref Range Status   01/01/2019 7.32 (H) 0.66 - 1.25 mg/dL Final   ]      Intake/Output Summary (Last 24 hours) at 1/2/2019 0714  Last data filed at 1/1/2019 2200  Gross per 24 hour   Intake 490 ml   Output --   Net 490 ml       Assessment/Plan:  83 year old male POD#3 s/p left arm hero graft thrombectomy, evacuation of hematoma    Continue to use catheter for dialysis for 2 weeks, then ok to use Hero graft  Arm elevation, pain management  Dispo per primary, HD per nephrology.    Zaira Hickman MD  Vascular Surgery Fellow  Pager [phone]    STAFF:  As above.  HeRO working well.                                  Salazar Domingo MD

## 2019-01-02 NOTE — PLAN OF CARE
Pt alert, unable to assess orientation d/t language barrier. AVSS. Denies pain. Lungs expiratory wheezing on lower lung fields. Bowels active, +flat/bm. No voiding d/t hemodialysis. Assist of 2. Lt. Arm fistula CDI, bruit present. Tele:  w/ 1st degree AVB.

## 2019-01-02 NOTE — PROGRESS NOTES
SW: Reached out to pt son, Amanda, to determine if they have made a decision re: home care services. SW spoke with Ya and he declined the need for home care services at this time. DANIEL discussed benefits of having these services, and recommended having the MD order and SW place referral in case they find they could benefit from the service once pt returns home. Family continues to decline. DANIEL updated Dr. Arzola.    GETACHEW Saleem, [facility]  Daytime (8:00am-4:30pm): [phone]  After-Hours SW Pager (4:30pm-11:30pm): [phone]

## 2019-01-02 NOTE — PROGRESS NOTES
Called son Ya, to discuss home health care recommendations. Ya will discuss with spouse and get back to me. Name and phone number given to Ya.

## 2019-01-02 NOTE — PROGRESS NOTES
Assessment and Plan:   ESRD: seen on dialysis. Running with R CVC, 400 BFR, 3.5 h, 2 kg UF. 2K and 33 HCO3. Low dose heparin.   On phoslo, hectorol and EPO.   Has LUE HeRO graft, S/P thrombectomy and evacuation of hematoma (12/30). Using R CVC (placed 12/28) while this recovers from surgical intervention. Would use CVC for 4 weeks.             Interval History:   Hypertension:  On norvasc, lisinopril, metoprolol. Will increase metoprolol dose.     DM  Anemia              Review of Systems:   Non-conversant.           Medications:       - MEDICATION INSTRUCTIONS for Dialysis Patients -   Does not apply See Admin Instructions     sodium chloride 0.9%  250 mL Intravenous Once in dialysis     sodium chloride 0.9%  300 mL Hemodialysis Machine Once     allopurinol  100 mg Oral Daily     amLODIPine  5 mg Oral BID     calcium acetate  1,334 mg Oral TID w/meals     clopidogrel  75 mg Oral Daily     doxercalciferol  4 mcg Intravenous Once in dialysis     epoetin gretchen (EPOGEN,PROCRIT) inj ESRD  10,000 Units Intravenous Once in dialysis     heparin (porcine)  500 Units Hemodialysis Machine Once in dialysis     heparin  3 mL Intracatheter During Hemodialysis (from stock)     heparin  3 mL Intracatheter During Hemodialysis (from stock)     heparin Lock (1000 units/mL High concentration)  3 mL Intracatheter Once     heparin Lock (1000 units/mL High concentration)  3 mL Intracatheter Once     insulin aspart  1-7 Units Subcutaneous TID AC     insulin aspart  1-5 Units Subcutaneous At Bedtime     insulin glargine  5 Units Subcutaneous At Bedtime     levETIRAcetam  500 mg Oral Daily     lisinopril  20 mg Oral Daily     metoprolol succinate ER  25 mg Oral Daily     omeprazole  20 mg Oral QAM     sodium chloride (PF)  3 mL Intracatheter Q8H       heparin (porcine)       sodium chloride Stopped (12/30/18 1443)     Current active medications and PTA medications reviewed, see medication list for details.            Physical Exam:    Vitals were reviewed  Patient Vitals for the past 24 hrs:   BP Temp Temp src Pulse Heart Rate Resp SpO2 Weight   19 1030 148/67 -- -- 89 -- -- -- --   19 1015 151/60 -- -- 88 -- -- -- --   19 1000 151/60 -- -- 89 -- -- -- --   19 0945 146/63 -- -- 85 -- -- -- --   19 0930 140/62 -- -- 83 -- -- -- --   19 0915 136/65 -- -- 80 -- -- -- --   19 0900 135/65 -- -- 75 -- -- -- --   19 0845 140/68 -- -- -- 82 -- -- --   19 0830 149/70 -- -- -- 83 18 -- --   19 0820 150/70 98.2  F (36.8  C) Oral -- 82 18 -- --   19 0805 -- -- -- -- -- -- -- 64.1 kg (141 lb 5 oz)   19 0735 139/69 98.2  F (36.8  C) Oral -- 82 18 93 % --   19 0006 -- -- -- -- -- -- -- 64.1 kg (141 lb 5 oz)   19 0000 121/58 97.4  F (36.3  C) Axillary 65 65 18 93 % --   19 1939 133/61 98.2  F (36.8  C) Oral 76 -- 18 93 % --   19 1548 121/57 98.3  F (36.8  C) Oral 65 -- 18 100 % --   19 1142 128/60 97.4  F (36.3  C) Axillary 70 -- 18 99 % --       Temp:  [97.4  F (36.3  C)-98.3  F (36.8  C)] 98.2  F (36.8  C)  Pulse:  [65-89] 89  Heart Rate:  [65-83] 82  Resp:  [18] 18  BP: (121-151)/(57-70) 148/67  SpO2:  [93 %-100 %] 93 %    Temperatures:  Current - Temp: 98.2  F (36.8  C); Max - Temp  Av  F (36.7  C)  Min: 97.4  F (36.3  C)  Max: 98.3  F (36.8  C)  Respiration range: Resp  Av  Min: 18  Max: 18  Pulse range: Pulse  Av.6  Min: 65  Max: 89  Blood pressure range: Systolic (24hrs), Av , Min:121 , Max:151   ; Diastolic (24hrs), Av, Min:57, Max:70    Pulse oximetry range: SpO2  Av.6 %  Min: 93 %  Max: 100 %    I/O last 3 completed shifts:  In: 490 [P.O.:490]  Out: -       Intake/Output Summary (Last 24 hours) at 2019 1043  Last data filed at 2019 2200  Gross per 24 hour   Intake 290 ml   Output --   Net 290 ml       Alert, non-conversant  LUE with edema and clean dressing, + thrill over graft  R CVC clean exit site       Wt  Readings from Last 4 Encounters:   01/02/19 64.1 kg (141 lb 5 oz)   11/21/18 62.8 kg (138 lb 8 oz)   09/20/18 54.4 kg (120 lb)   05/31/18 56.7 kg (125 lb)          Data:          Lab Results   Component Value Date     01/02/2019     01/01/2019     12/31/2018    Lab Results   Component Value Date    CHLORIDE 96 01/02/2019    CHLORIDE 97 01/01/2019    CHLORIDE 98 12/31/2018    Lab Results   Component Value Date    BUN 58 01/02/2019    BUN 40 01/01/2019    BUN 56 12/31/2018      Lab Results   Component Value Date    POTASSIUM 4.9 01/02/2019    POTASSIUM 5.6 01/01/2019    POTASSIUM 5.8 12/31/2018    Lab Results   Component Value Date    CO2 26 01/02/2019    CO2 29 01/01/2019    CO2 25 12/31/2018    Lab Results   Component Value Date    CR 8.77 01/02/2019    CR 7.32 01/01/2019    CR 8.70 12/31/2018        Recent Labs   Lab Test 01/01/19  0736 12/31/18  1152 12/31/18  0800  12/30/18  0902 12/29/18  0945   WBC 6.4  --   --   --  6.5 5.0   HGB 8.2* 9.0* 6.4*   < > 7.6* 7.8*   HCT 24.7*  --   --   --  22.7* 24.1*   MCV 89  --   --   --  89 90     --   --   --  179 152    < > = values in this interval not displayed.     Recent Labs   Lab Test 12/26/18  1521 11/14/17  1259 10/06/17  1650  03/31/17  1307  12/18/15  1702  06/19/14  0451   AST 8 14 10   < > 42   < >  --    < > 39   ALT 10 18 17   < > 45   < >  --    < > 37   ALKPHOS 51 82 68   < > 62   < >  --    < > 160*   BILITOTAL 0.7 0.8 0.8   < > 0.6   < >  --    < > 0.7   BILICONJ  --   --   --   --   --   --   --   --  0.0   CRUZ  --   --   --   --  60*  --  22  --   --     < > = values in this interval not displayed.       Recent Labs   Lab Test 06/23/14  0326 06/22/14  0356 06/21/14  0010   MAG 2.0 1.8 1.7     Recent Labs   Lab Test 12/31/18  0800 12/21/15  0933 06/25/14  0900   PHOS 3.4 3.1 2.6     Recent Labs   Lab Test 01/02/19  0753 01/01/19  0736 12/31/18  0800   SHIRA 8.6 8.3* 7.9*       Lab Results   Component Value Date    SHIRA 8.6  01/02/2019     Lab Results   Component Value Date    WBC 6.4 01/01/2019    HGB 8.2 (L) 01/01/2019    HCT 24.7 (L) 01/01/2019    MCV 89 01/01/2019     01/01/2019     Lab Results   Component Value Date     01/02/2019    POTASSIUM 4.9 01/02/2019    CHLORIDE 96 01/02/2019    CO2 26 01/02/2019     (H) 01/02/2019     Lab Results   Component Value Date    BUN 58 (H) 01/02/2019    CR 8.77 (H) 01/02/2019     Lab Results   Component Value Date    MAG 2.0 06/23/2014     Lab Results   Component Value Date    PHOS 3.4 12/31/2018       Creatinine   Date Value Ref Range Status   01/02/2019 8.77 (H) 0.66 - 1.25 mg/dL Final   01/01/2019 7.32 (H) 0.66 - 1.25 mg/dL Final   12/31/2018 8.70 (H) 0.66 - 1.25 mg/dL Final   12/30/2018 8.47 (H) 0.66 - 1.25 mg/dL Final   12/30/2018 7.86 (H) 0.66 - 1.25 mg/dL Final   12/28/2018 11.50 (H) 0.66 - 1.25 mg/dL Final       Attestation:  I have reviewed today's vital signs, notes, medications, labs and imaging.     Greg Agrawal MD

## 2019-01-02 NOTE — PROGRESS NOTES
I called [facility] ([phone]) regarding patient discharging today and that son is arranging transport for Friday's dialysis run. I spoke to Jodie.

## 2019-01-02 NOTE — PLAN OF CARE
Alert & hard to assess mentation w/language barrier, arouses to voice. VSS on room air. Tele SR w/AV block and PACs. Lungs clear and diminished. BS+, BMx1, flatus+. Incision CDI w/sutures, dressing changed x2. Significant 4+ edema in LUE. Tolerating renal diet, low appetite. BGs 164 and 175. No voiding as patient is on hemodialysis. Tylenol x1 for pain. Up w/assist of 2. PRN neb x1.

## 2019-01-02 NOTE — PROGRESS NOTES
Dialysis Run:  Previous Hep B status of Patient on machine verified by me   Pt Arrived to acute rm via wheelchair  Time out taken  Pt ran for 3.5 hours on a K2 with a net fluid removal of 2L  A BF of 400 was maintained via RIJ  Meds :Epo and Hecterol  Complications:None  Pt was seen by Dr Agrawal during run  Aseptic prep both on and off procedure.  Procedure and ESRD discussed and all questions answered pt only knows a few words but understand HD procedute  VSS post run See EPIC for more details  Water detection monitor on floor during run  Pt dialyzes at [place] MWF  Pre Fujkit27.1kg    Post Weight 62.1kg    EDW 61?    Potassium   Date Value Ref Range Status   01/02/2019 4.9 3.4 - 5.3 mmol/L Final   ]  Hemoglobin   Date Value Ref Range Status   01/01/2019 8.2 (L) 13.3 - 17.7 g/dL Final   ]  Creatinine   Date Value Ref Range Status   01/02/2019 8.77 (H) 0.66 - 1.25 mg/dL Final   ]      Joyce Frazier RN [facility]

## 2019-01-02 NOTE — DISCHARGE SUMMARY
[facility]    Discharge Summary  Hospitalist    Date of Admission:  12/26/2018  Date of Discharge:  1/2/2019  6:43 PM  Discharging Provider: Caitlin Arzola MD  Date of Service (when I saw the patient): 01/02/19    Discharge Diagnoses   Malfunctioning dialysis fistula  Large left arm hematoma s/p surgical evacuation  Acute blood loss anemia on chronic anemia  ESRD on HD  HTN  H/o Gout  Type 2 DM, insulin dependent  GERD        History of Present Illness   Angle Munguia is a 83 year old male with PMH of HTN, ESRD on HD, DM, gout- admitted for evaluation of malfunctioning dialysis site; for a detailed HPI- please refer to H&P done by Dr. Phillip Pearson on 12/26/2018.      Hospital Course   Angle Munguia was admitted on 12/26/2018.  The following problems were addressed during his hospitalization:    Malfunctioning dialysis fistula  Large left arm hematoma s/p surgical evacuation  - US UE- Extensive hematoma extending from the axilla to the antecubital fossa; HeRO graft was occluded- possibly due to compression from the large hematoma.  - Vasc surgery consult appreciated  - CVC right internal jugular tunnel catheter placed on 12/28  - s/p left arm thrombectomy with evacuation of left upper arm hematoma on 12/30/2018; >500cc clot/hematoma per report.  - post op care as per Vasc surgery  - resumed on Plavix  - keep left upper extremity elevated on pillows  - as per vasc surgery- dialysis unit can use the HeRO graft in approximately 2-4 weeks  - Home Health Care was offered to the patient's family but they declined.     Acute blood loss anemia (due to left arm hematoma) on chronic anemia:    - prior Hb 10-11; Denies rectal bleed/melena/hematemesis  - admitted with Hb 6.7--6.1. Transfused.  - serial Hb now 9--8.2- stable     ESRD on HD (MWF).  - CVC right internal jugular tunnel catheter placed on 12/28  - had HD as per Nephrology  - as per vasc surgery- dialysis unit can use the HeRO graft in  approximately 2-4 weeks  - continue PTA Phoslo     Gout: Stable.  - Continue prior to admission allopurinol     Hypertension:   - BP controlled on PTA amlodipine 5 mg po BID, Toprol XL 25 mg po daily, and lisinopril 20 mg po daily     Diabetes mellitus: PTA on Lantus 10 units at bedtime  - in the hospital- he was managed with decreased dose of Laisx 5 unuts at bedtime and ISS  - Insulin sliding scale.     GERD: Stable.  - Continue prior to admission omeprazole.      Caitlin Arzola MD    Significant Results and Procedures   See below    12/30/2018  1.  Evacuation, right upper arm hematoma.   2.  Open thrombectomy, left upper arm HeRO graft via proximal one-third Acuseal PTFE segment.   3.  Intraoperative venograms.   4.  Repair of graft dialysis access bleeding sites.         Pending Results   None  Unresulted Labs Ordered in the Past 30 Days of this Admission     No orders found from 10/27/2018 to 12/27/2018.          Code Status   Full Code       Primary Care Physician   La Nena Garcia    Physical Exam   Temp: 97.7  F (36.5  C) Temp src: Oral BP: 126/61 Pulse: 87 Heart Rate: 82 Resp: 16 SpO2: 94 % O2 Device: None (Room air) Oxygen Delivery: 2 LPM  Vitals:    01/01/19 0639 01/02/19 0006 01/02/19 0805   Weight: 67.3 kg (148 lb 5.9 oz) 64.1 kg (141 lb 5 oz) 64.1 kg (141 lb 5 oz)     Vital Signs with Ranges  Temp:  [97.4  F (36.3  C)-98.3  F (36.8  C)] 97.7  F (36.5  C)  Pulse:  [65-89] 87  Heart Rate:  [65-83] 82  Resp:  [16-18] 16  BP: (121-151)/(54-70) 126/61  SpO2:  [93 %-100 %] 94 %  I/O last 3 completed shifts:  In: 290 [P.O.:290]  Out: 2000 [Other:2000]    Constitutional: Awake, alert, fatigued   Respiratory:   Bilateral air entry, no wheezing, no rales, no crackles  Cardiovascular: S1S2, RRR, no murmurs, no rubs  GI: abdomen- soft, nonT, nonD, BS present  Skin/Integumen: no rashes, no cyanosis  Extremities- LUE swelling- improved           Discharge Disposition   Discharged to home  Condition at  discharge: Satisfactory    Consultations This Hospital Stay   NEPHROLOGY IP CONSULT  VASCULAR SURGERY IP CONSULT  HEMATOLOGY & ONCOLOGY IP CONSULT  PHYSICAL THERAPY ADULT IP CONSULT  OCCUPATIONAL THERAPY ADULT IP CONSULT    Time Spent on this Encounter   I, Caitlin Arzola, personally saw the patient today and spent greater than 30 minutes discharging this patient.    Discharge Orders      Reason for your hospital stay    Left arm hematoma s/p surgical evacuation  Anemia     Follow-up and recommended labs and tests     Follow up with primary care provider, La Nena Garcia, within 7 days for hospital follow- up.  The following labs/tests are recommended: Hb.    Follow up with Vascular surgery     Activity    Your activity upon discharge: activity as tolerated     When to contact your care team    Call your primary doctor or return to ER if you have any of the following: temperature greater than 100.5 or less than 96, increased shortness of breath, increased left arm swelling or pain, drainage from incision site, chest pain, dizziness, loss of consciousness.     Monitor and record    blood glucose 4 times a day, before meals and at bedtime     Full Code     Diet    Follow this diet upon discharge: Orders Placed This Encounter      Dialysis Diet     Discharge Medications   Current Discharge Medication List      START taking these medications    Details   acetaminophen (TYLENOL) 325 MG tablet Take 2 tablets (650 mg) by mouth every 6 hours as needed for mild pain    Associated Diagnoses: Hematoma of arm, left, initial encounter      clopidogrel (PLAVIX) 75 MG tablet Take 1 tablet (75 mg) by mouth daily  Qty: 30 tablet, Refills: 0    Comments: Future refills by PCP Dr. La Nena Garcia with phone number [phone].  Associated Diagnoses: Dialysis complication, subsequent encounter         CONTINUE these medications which have CHANGED    Details   insulin glargine (LANTUS SOLOSTAR PEN) 100 UNIT/ML pen Inject 5-10  Units Subcutaneous At Bedtime Son states patient takes about 10 units  Qty: 3 mL, Refills: 0         CONTINUE these medications which have NOT CHANGED    Details   ALLOPURINOL PO Take 100 mg by mouth daily      AMLODIPINE BESYLATE PO Take 5 mg by mouth 2 times daily      B Complex-C-Folic Acid (DIALYVITE) TABS Take 1 tablet by mouth daily       calcium acetate (PHOSLO) 667 MG CAPS Take 1,334 mg by mouth 3 times daily (with meals)       levETIRAcetam (KEPPRA) 500 MG tablet Take 1 tablet (500 mg) by mouth daily  Qty: 30 tablet, Refills: 0      LISINOPRIL PO Take 20 mg by mouth daily      metoprolol succinate ER (TOPROL-XL) 25 MG 24 hr tablet Take 1 tablet (25 mg) by mouth 2 times daily  Qty: 30 tablet, Refills: 0      OMEPRAZOLE PO Take 20 mg by mouth every morning      Cholecalciferol (VITAMIN D3 PO) Take 400 Units by mouth daily       nitroglycerin (NITROSTAT) 0.4 MG sublingual tablet Place 0.4 mg under the tongue every 5 minutes as needed for chest pain For chest pain place 1 tablet under the tongue every 5 minutes for 3 doses. If symptoms persist 5 minutes after 1st dose call 911.      Urea 40 % CREA Externally apply  topically 2 times daily. As directed.  Qty: 180 g, Refills: 3    Associated Diagnoses: Xerosis cutis           Allergies   No Known Allergies  Data   Most Recent 3 CBC's:  Recent Labs   Lab Test 01/01/19  0736 12/31/18  1152 12/31/18  0800  12/30/18  0902 12/29/18  0945   WBC 6.4  --   --   --  6.5 5.0   HGB 8.2* 9.0* 6.4*   < > 7.6* 7.8*   MCV 89  --   --   --  89 90     --   --   --  179 152    < > = values in this interval not displayed.      Most Recent 3 BMP's:  Recent Labs   Lab Test 01/02/19  0753 01/01/19  0736 12/31/18  0800    133 133   POTASSIUM 4.9 5.6* 5.8*   CHLORIDE 96 97 98   CO2 26 29 25   BUN 58* 40* 56*   CR 8.77* 7.32* 8.70*   ANIONGAP 12 7 10   SHIRA 8.6 8.3* 7.9*   * 141* 106*     Most Recent 2 LFT's:  Recent Labs   Lab Test 12/26/18  1521 11/14/17  1259   AST  8 14   ALT 10 18   ALKPHOS 51 82   BILITOTAL 0.7 0.8     Most Recent INR's and Anticoagulation Dosing History:  Anticoagulation Dose History     Recent Dosing and Labs Latest Ref Rng & Units 8/17/2017 8/31/2017 10/6/2017 5/31/2018 9/20/2018 12/28/2018 12/30/2018    INR 0.86 - 1.14 1.10 1.17(H) 1.18(H) 1.10 1.13 1.25(H) 1.24(H)        Most Recent 3 Troponin's:  Recent Labs   Lab Test 03/31/17  1307 12/19/15  2109 12/18/15  1701  07/17/14  0325  06/18/14  1507   TROPI 0.015 0.032 0.032   < >  --    < >  --    TROPONIN  --   --   --   --  0.00  --  0.00    < > = values in this interval not displayed.     Most Recent Cholesterol Panel:No lab results found.  Most Recent 6 Bacteria Isolates From Any Culture (See EPIC Reports for Culture Details):  Recent Labs   Lab Test 12/18/15  1655 10/29/14  1643 10/29/14  1636 06/22/14  0345 06/21/14  1815 06/21/14  1030   CULT No growth No growth No growth No growth Quantity not sufficient  Canceled, Test credited   No growth  No growth     Most Recent TSH, T4 and A1c Labs:  Recent Labs   Lab Test 12/26/18  1521  12/18/15  1701   TSH  --   --  0.86   A1C 5.5   < >  --     < > = values in this interval not displayed.     Results for orders placed or performed during the hospital encounter of 12/26/18   US Upper Extremity Venous Duplex Left    Narrative    PROCEDURE:  Venous Doppler ultrasound of the left upper extremity    DATE OF PROCEDURE:  12/28/2018 10:56 AM    CLINICAL HISTORY/INDICATION:   swelling. Assess for fluid collection, DVT    COMPARISON:   None relevant    TECHNIQUE:   Grayscale, color-flow, and spectral waveform analysis were performed  of the deep veins of the left upper extremity    FINDINGS:   Large hematoma extending from the axilla to the antecubital fossa  along the length of the humoral component of graft.    The left jugular vein, cephalic vein radial vein and ulnar veins are  patent and free of thrombus. The axillary, subclavian, basilic and  brachial veins  are not seen.      Impression    IMPRESSION:   1.  Extensive hematoma extending from the axilla to the antecubital  fossa.  2.  No venous thrombus within the visualized veins of the upper  extremity.    Results communicated with Dr. Domingo by Dr. Kaiser @ 12:11 am 12/28/18    ABHINAV KAISER MD   IR CVC Tunnel Placement > 5 Yrs of Age    Addendum: 12/31/2018    Angle Munguia  Accession: #[id number]    Total sedation time was 10 minutes.    Josselin Dow MD  Date of Addendum: 12/31/2018.    JOSSELIN DOW DO      Narrative    INTERVENTIONAL RADIOLOGY CENTRAL VENOUS CATHETER TUNNEL PLACEMENT  GREATER THAN YEARS OF AGE   12/28/2018 4:08 PM     CLINICAL HISTORY/INDICATION: Hematoma on left upper arm HeRO graft  making this unusable    PROCEDURES PERFORMED: Tunneled dialysis catheter    MEDICATIONS: 10 mL 1% lidocaine, 0.5 mg Versed IV, 50 mcg Fentanyl IV  and Ancef 2 g IV    CONTRAST: None    FLUORO: 0.1 minutes    IMAGES/DOSE: 0.66 mGy    CONSENT: Following a discussion of the risks, benefits, indications  and alternatives to treatment, appropriate informed consent was  obtained.      TIMEOUT: A timeout was performed per universal protocol policy to  ensure correct patient, site, and procedure to be performed.     CENTRAL LINE STATEMENT: The patient was brought to the interventional  radiology suite and placed supine on the table. The patients right  neck and anterior chest region were prepped and draped in a sterile  fashion for tunneled line placement.  The procedure was performed  using maximal Sterile Barrier Technique Utilized: Cap AND mask AND  sterile gown AND sterile gloves AND sterile full body drape AND hand  hygiene AND skin preparation 2% chlorhexidine for cutaneous antisepsis  (or acceptable alternative antiseptics).  Sterile Ultrasound Technique  Utilized ?Sterile gel AND sterile probe covers.     PROCEDURE AND FINDINGS:   This central line was performed in accordance with the central line  bundle  with the exception of vein selection. Following a discussion of  the risks, benefits, indications and alternatives to treatment,  appropriate informed consent was obtained.  The patient was brought to  the interventional radiology suite and placed supine on the table. The  patients right neck and anterior chest region were prepped and draped  in a sterile fashion for tunneled line placement.  A timeout was  performed per universal protocol policy to ensure correct patient,  site, and procedure to be performed.      Ultrasound was utilized to evaluate the veins of the right neck and a  permanent record of the image was obtained which demonstrates the  internal jugular vein to be patent. Under direct ultrasound guidance,  1% Lidocaine was infiltrated and access was gained easily into the low  lateral aspect of the internal jugular vein utilizing micropuncture  technique. The wire was advanced easily under fluoroscopic guidance  and the tract was serially dilated and a peel away sheath placed. A  subcutaneous tunnel was then created over the anterior/superior chest  wall using local anesthesia and blunt dissection. Under fluoroscopic  guidance, a 14.5 Vincentian dual lumen 19 cm cuffed catheter was then  pulled through the tunnel and was advanced through the peel away  sheath. A final placement film demonstrates the catheter tip at the  cavoatrial junction with the patient supine.  All of the ports flush  and aspirate without difficulty following placement.  The catheter was  secured in position. The small incision at the base of the neck was  closed uneventfully.  A sterile dressing was applied.     Throughout the procedure, the patient was monitored by a radiology  nurse for cardiac rhythm which remained stable. The patient tolerated  the procedure well and left the interventional radiology suite in  stable condition.      Impression    IMPRESSION:  Uneventful placement of a right internal jugular vein 14.5 Vincentian  dual  lumen 19 cm tunneled Palindrome catheter, as described using  ultrasound and fluoroscopic guidance. This catheter is indicated to be  use for hemodialysis or pheresis.    DHRUV DOW DO

## 2019-01-02 NOTE — PLAN OF CARE
A&O x4, VSS, diminished LS, +BS, passing gas, +BM. Incot of bowel. Bruit adudible in L arm, dressing CDI. IJ dressing CDI. , 112, no coverage needed. A x2 with gait belt. Dialysis diet. Denies pain.

## 2019-01-03 NOTE — PLAN OF CARE
Occupational Therapy Discharge Summary    Reason for therapy discharge:    Discharged to home.    Progress towards therapy goal(s). See goals on Care Plan in [facility] electronic health record for goal details.  Goals not met.  Barriers to achieving goals:   discharge from facility.    Therapy recommendation(s):    Continued therapy is recommended.  Rationale/Recommendations:  home, recommend 24 hr S and A with ADL/IADL's and functional mobility and home OT for ADL's and home safety and home RN for med mgmt, however, declined, per chart.

## 2019-01-03 NOTE — PROGRESS NOTES
Vital signs stable. Incision clean, dry, and intact. CVC in place and son instructed on how to care for it; such as during a shower. Follow up appointments gone over with patient's son and he verbalized understanding and will call to schedule appointments. Patient belongings packed by family. All discharge questions answered prior to patient leaving. Patient transported home via car per son.

## 2019-01-11 PROBLEM — J96.01 ACUTE RESPIRATORY FAILURE WITH HYPOXIA (H): Status: ACTIVE | Noted: 2019-01-01

## 2019-01-11 NOTE — ED TRIAGE NOTES
Angle comes from dialysis after a 20 minute run that was aborted for SOB. He comes in with respiratory distress and on CPAP with SPF. They have given him a neb and we will start bipap on him in a big room.

## 2019-01-11 NOTE — CONSULTS
Nephrology Initial Consult  January 11, 2019      Angle Munguia MRN:[id number] YOB: 1935  Date of Admission:1/11/2019  Primary care provider: La Nena Garcia  Requesting physician: No att. providers found    ASSESSMENT AND RECOMMENDATIONS:   Angle Munguia is a 83 year old male with PMH of ESRD, HTN, DM2, GERD who was seen hypoxic in dialysis today and was sent to ED for respiratory failure intubated in ED now on vents and treated for possible pneumonia.     ESRD: on HD due to DM,HTN, dialysis at [facility], under care of Dr John hodges, MWF, tunnel cathter 3hr 15 min, EDW 62.5kg.  -- partial dialysis done today at [facility]   -- try to finish his dialysis run today after intubateion but his blood pressure was low we are able to do 1hr only  -- HD tomorrow if needed but if remain hypotensive will initiate CRRT.      Access: Right internal jugular tunnel cathter being used now, left AVG hero graft recently revised.     Acute respiratory failure: intubated due to presumed pneumonia vs pulm edema. ICU team managing     BP and volume: hypotensive after intubation, lung sound wet and likely pulm edema but if pneumonia and sepsis might consider CRRT but if BP improved then will target his dry weight and if needed will UF more fluid as BP tolerate.     Electrolyte and acid base: no issue      Anemia: getting Epogen at [facility]      BMD: Ca 8.0, albumin 3.1, Phos  3.9, pth 164,       Recommendations were communicated to primary team via in person    Seen and discussed with Dr. Ja Lou MD   [phone]      REASON FOR CONSULT: ESRD    HISTORY OF PRESENT ILLNESS:  Admitting provider and nursing notes reviewed  Angle Munguia is a 83 year old male with PMH of ESRD, HTN, DM2, GERD, he is dailysis patient had multiple dialysis access issue in the past and recently was admitted at [facility] for evaluations of AVG hero graft in his left arm and management of hematoma, he then had a  tunnel cathter placed he was discharge 1/2/2019 and since then he start to have more cough dry and sob getting worse he used home oxygen on and off and start to use more, today he presented to [facility] and was doing dialysis he only finish 1/2 hr run then he become hypoxic even after given him oxygen, 911 was called and brought to ED his O2 sat was in 80s, subsequently intubated after that he was sent to ICU, he then become hypotensive, his CXr shows right side infiltrate vs edema, antibiotics was started, tube section shows brown color, ROS now unobtainable.      PAST MEDICAL HISTORY:  Patient intubated  01/11/2019     Past Medical History:   Diagnosis Date     Anemia      Blind left eye      Chronic kidney disease      Dermatochalasis      Dialysis patient (H)      Dry eye syndrome      Gastric ulcer      Glaucoma (increased eye pressure)      Gout      Hyperparathyroidism (H)      Hypertension      Nonsenile cataract      Retinal detachment     LE, now NLP      Seizures (H)      Subdural hematoma (H)      Swelling of left upper extremity      Tachycardia      Type 2 diabetes mellitus without complications (H)        Past Surgical History:   Procedure Laterality Date     AV FISTULA OR GRAFT ARTERIAL      right arm     C PLACE CATH AV DIALYSIS SHUNT       CATARACT IOL, RT/LT Bilateral 2011     ENDOVASCULAR PLACEMENT VASCULAR DEVICE Left 11/21/2018    Procedure: LEFT UPPER EXTREMITY HERO GRAFT WITH INSTANT STICK, VENOGRAM, EXCISION OF AV FISTULA ANEURYSM;  Surgeon: Salazar Domingo MD;  Location:  OR     ESOPHAGOSCOPY, GASTROSCOPY, DUODENOSCOPY (EGD), COMBINED  1/30/2014    Procedure: COMBINED ESOPHAGOSCOPY, GASTROSCOPY, DUODENOSCOPY (EGD), BIOPSY SINGLE OR MULTIPLE;;  Surgeon: Ashlyn Friedman MD;  Location:  GI     ESOPHAGOSCOPY, GASTROSCOPY, DUODENOSCOPY (EGD), COMBINED  3/27/2014    Procedure: COMBINED ESOPHAGOSCOPY, GASTROSCOPY, DUODENOSCOPY (EGD), BIOPSY SINGLE OR MULTIPLE;;   Surgeon: Ashlyn Friedman MD;  Location: [facility] GI     IR CVC TUNNEL PLACEMENT > 5 YRS OF AGE  12/28/2018     THROMBECTOMY UPPER EXTREMITY Left 12/30/2018    Procedure: LEFT ARM THROMBECTOMY, HERO GRAFT, EVACUATE HEMATOMA, HEROGRAM;  Surgeon: Salazar Domingo MD;  Location:  OR     UPPER GI ENDOSCOPY  3/27/14        MEDICATIONS:  PTA Meds  Prior to Admission medications    Medication Sig Last Dose Taking? Auth Provider   acetaminophen (TYLENOL) 325 MG tablet Take 2 tablets (650 mg) by mouth every 6 hours as needed for mild pain   Caitlin Arzola MD   ALLOPURINOL PO Take 100 mg by mouth daily   Reported, Patient   AMLODIPINE BESYLATE PO Take 5 mg by mouth 2 times daily   Reported, Patient   B Complex-C-Folic Acid (DIALYVITE) TABS Take 1 tablet by mouth daily    Reported, Patient   calcium acetate (PHOSLO) 667 MG CAPS Take 1,334 mg by mouth 3 times daily (with meals)    Unknown, Entered By History   Cholecalciferol (VITAMIN D3 PO) Take 400 Units by mouth daily    Reported, Patient   clopidogrel (PLAVIX) 75 MG tablet Take 1 tablet (75 mg) by mouth daily   Caitlin Arzola MD   insulin glargine (LANTUS SOLOSTAR PEN) 100 UNIT/ML pen Inject 5-10 Units Subcutaneous At Bedtime Son states patient takes about 10 units   Caitlin Arzola MD   levETIRAcetam (KEPPRA) 500 MG tablet Take 1 tablet (500 mg) by mouth daily   Enrrique Lozano MD   LISINOPRIL PO Take 20 mg by mouth daily   Reported, Patient   metoprolol succinate ER (TOPROL-XL) 25 MG 24 hr tablet Take 1 tablet (25 mg) by mouth 2 times daily   Caitlin Arzola MD   nitroglycerin (NITROSTAT) 0.4 MG sublingual tablet Place 0.4 mg under the tongue every 5 minutes as needed for chest pain For chest pain place 1 tablet under the tongue every 5 minutes for 3 doses. If symptoms persist 5 minutes after 1st dose call 911.   Reported, Patient   OMEPRAZOLE PO Take 20 mg by mouth every morning   Unknown, Entered By History   Urea 40 %  CREA Externally apply  topically 2 times daily. As directed.   Santi Haney MD      Current Meds    gelatin absorbable  1 each Topical During Hemodialysis (from stock)     sodium chloride (PF)  9 mL Intracatheter During Hemodialysis (from stock)     sodium chloride (PF)  9 mL Intracatheter During Hemodialysis (from stock)     vancomycin (VANCOCIN) IV  25 mg/kg (Dosing Weight) Intravenous Once     vancomycin place najera - receiving intermittent dosing  1 each Intravenous See Admin Instructions     Infusion Meds    dexmedetomidine         ALLERGIES:    No Known Allergies    REVIEW OF SYSTEMS:  Intubated pt     SOCIAL HISTORY:   Social History     Socioeconomic History     Marital status:      Spouse name: Not on file     Number of children: Not on file     Years of education: Not on file     Highest education level: Not on file   Social Needs     Financial resource strain: Not on file     Food insecurity - worry: Not on file     Food insecurity - inability: Not on file     Transportation needs - medical: Not on file     Transportation needs - non-medical: Not on file   Occupational History     Not on file   Tobacco Use     Smoking status: Never Smoker     Smokeless tobacco: Never Used   Substance and Sexual Activity     Alcohol use: No     Drug use: No     Sexual activity: Not on file   Other Topics Concern     Parent/sibling w/ CABG, MI or angioplasty before 65F 55M? Not Asked   Social History Narrative     Not on file      patient intubated    accompanies Angle Munguia in hospital room    FAMILY MEDICAL HISTORY:   Family History   Problem Relation Age of Onset     Glaucoma No family hx of      Macular Degeneration No family hx of      patient intubated     PHYSICAL EXAM:   Temp  Av.2  F (36.8  C)  Min: 97.9  F (36.6  C)  Max: 98.6  F (37  C)      Pulse  Av.1  Min: 86  Max: 132 Resp  Av.5  Min: 16  Max: 36  SpO2  Av.1 %  Min: 97 %  Max: 100 %       BP 90/56   Pulse 86   Temp  98.6  F (37  C) (Axillary)   Resp (!) 34   Wt 61.8 kg (136 lb 3.9 oz)   SpO2 100%   BMI 25.74 kg/m     Date 01/11/19 0700 - 01/12/19 0659   Shift 0448-3535 8606-7515 3581-1661 24 Hour Total   INTAKE   I.V.  30.28  30.28   Shift Total(mL/kg)  30.28(0.49)  30.28(0.49)   OUTPUT   Shift Total(mL/kg)       Weight (kg)  61.8 61.8 61.8      Admit Weight: 61.8 kg (136 lb 3.9 oz)     GENERAL APPEARANCE: intubated and sedated  EYES: no scleral icterus, pupils equal  Endo: no goiter, no moon facies  Lymphatics: no cervical or supraclavicular LAD  Pulmonary: lungs diffuse wheeze and right side crakles auscultation with equal breath sounds bilaterally, no clubbing  CV: tachycardia    - JVD not elevated    - Edema 1+  GI: soft, nontender, normal bowel sounds  MS: no evidence of inflammation in joints, no muscle tenderness  : no maxwell  SKIN: no rash, warm, dry, no cyanosis  NEURO: intubated   Access: right Tunnel cathter, left hero AVG with suture over the site no bleeding , good thrill and bruit.      LABS:   CMP  Recent Labs   Lab 01/11/19  1254      POTASSIUM 4.4   CHLORIDE 98   CO2 28   ANIONGAP 9   *   BUN 27   CR 5.32*   GFRESTIMATED 9*   GFRESTBLACK 11*   SHIRA 8.0*   PROTTOTAL 7.8   ALBUMIN 3.1*   BILITOTAL 0.6   ALKPHOS 87   AST 29   ALT 13     CBC  Recent Labs   Lab 01/11/19  1254   HGB 7.9*   WBC 9.0   RBC 2.84*   HCT 26.5*   MCV 93   MCH 27.8   MCHC 29.8*   RDW 15.6*        INR  Recent Labs   Lab 01/11/19  1254   INR 1.23*   PTT 38*     ABGNo lab results found in last 7 days.   URINE STUDIES  Recent Labs   Lab Test 06/21/14  1815 06/19/14  0345   COLOR Yellow Straw   APPEARANCE Clear Slightly Cloudy   URINEGLC Negative 30*   URINEBILI Negative Negative   URINEKETONE Negative Negative   SG 1.009 1.006   UBLD Moderate* Trace*   URINEPH 8.5* 8.0*   PROTEIN 300* 100*   NITRITE Negative Negative   LEUKEST Moderate* Small*   RBCU 92* 2   WBCU 18* 4*     No lab results found.  PTH  No lab results  found.  IRON STUDIES  Recent Labs   Lab Test 12/28/18  0805 12/21/15  1722 06/20/14  0404 04/01/14  1159 03/27/14  1055   IRON 19* 37 13* 55 57   * 154* 186* 239* 218*   IRONSAT 10* 24 7* 23 26   NTAALY 607* 491* 1,374* 769* 813*       IMAGING:  All imaging studies reviewed by me.     Caleb Lou MD

## 2019-01-11 NOTE — ED PROVIDER NOTES
History     Chief Complaint   Patient presents with     Shortness of Breath     HPI  Angle Munguia is a 83 year old male with a history of ESRD on HD, HTN, DM II, and GERD who presents to the ED from dialysis with difficulty breathing. While in dialysis his saturations were 82 and he was put on oxygen via nasal cannula. The patient is wheezing. He presents on CPAP with SPF, and EMS administered a neb. Per EMS, He was agitated and now is less agitated. He only received 20 minutes of dialysis due to shortness of breath.     I have reviewed the Medications, Allergies, Past Medical and Surgical History, and Social History in the [facility] system.  Past Medical History:   Diagnosis Date     Anemia      Blind left eye      Chronic kidney disease      Dermatochalasis      Dialysis patient (H)      Dry eye syndrome      Gastric ulcer      Glaucoma (increased eye pressure)      Gout      Hyperparathyroidism (H)      Hypertension      Nonsenile cataract      Retinal detachment     LE, now NLP      Seizures (H)      Subdural hematoma (H)      Swelling of left upper extremity      Tachycardia      Type 2 diabetes mellitus without complications (H)        Past Surgical History:   Procedure Laterality Date     AV FISTULA OR GRAFT ARTERIAL      right arm     C PLACE CATH AV DIALYSIS SHUNT       CATARACT IOL, RT/LT Bilateral 2011     ENDOVASCULAR PLACEMENT VASCULAR DEVICE Left 11/21/2018    Procedure: LEFT UPPER EXTREMITY HERO GRAFT WITH INSTANT STICK, VENOGRAM, EXCISION OF AV FISTULA ANEURYSM;  Surgeon: Salazar Domingo MD;  Location:  OR     ESOPHAGOSCOPY, GASTROSCOPY, DUODENOSCOPY (EGD), COMBINED  1/30/2014    Procedure: COMBINED ESOPHAGOSCOPY, GASTROSCOPY, DUODENOSCOPY (EGD), BIOPSY SINGLE OR MULTIPLE;;  Surgeon: Ashlyn Friedman MD;  Location:  GI     ESOPHAGOSCOPY, GASTROSCOPY, DUODENOSCOPY (EGD), COMBINED  3/27/2014    Procedure: COMBINED ESOPHAGOSCOPY, GASTROSCOPY, DUODENOSCOPY (EGD), BIOPSY SINGLE OR  MULTIPLE;;  Surgeon: Ashlyn Friedman MD;  Location: [facility] GI     IR CVC TUNNEL PLACEMENT > 5 YRS OF AGE  12/28/2018     THROMBECTOMY UPPER EXTREMITY Left 12/30/2018    Procedure: LEFT ARM THROMBECTOMY, HERO GRAFT, EVACUATE HEMATOMA, HEROGRAM;  Surgeon: Salazar Domingo MD;  Location: [facility] OR     UPPER GI ENDOSCOPY  3/27/14       Family History   Problem Relation Age of Onset     Glaucoma No family hx of      Macular Degeneration No family hx of        Social History     Tobacco Use     Smoking status: Never Smoker     Smokeless tobacco: Never Used   Substance Use Topics     Alcohol use: No       Current Facility-Administered Medications   Medication     0.9% sodium chloride BOLUS     alteplase (CATHFLO ACTIVASE) injection 2 mg     alteplase (CATHFLO ACTIVASE) injection 2 mg     dexmedetomidine (PRECEDEX) 400 mcg in 0.9% sodium chloride 100 mL     gelatin absorbable (GELFOAM) sponge 1 each     sodium chloride (PF) 0.9% PF flush 10 mL     sodium chloride (PF) 0.9% PF flush 10 mL     sodium chloride (PF) 0.9% PF flush 9 mL     sodium chloride (PF) 0.9% PF flush 9 mL     vancomycin (VANCOCIN) 1,500 mg in sodium chloride 0.9 % 250 mL intermittent infusion     vancomycin place najera - receiving intermittent dosing      No Known Allergies    Review of Systems   Unable to perform ROS: Acuity of condition       Physical Exam   BP: 184/83  Pulse: 103  Heart Rate: 99  Temp: 98.1  F (36.7  C)  Resp: (!) 31  Weight: 61.8 kg (136 lb 3.9 oz)  SpO2: 100 %      Physical Exam   Constitutional: He appears distressed.   HENT:   Head: Atraumatic.   Mouth/Throat: Oropharynx is clear and moist.   Eyes: Pupils are equal, round, and reactive to light. No scleral icterus.   Neck: Neck supple. JVD present.   Cardiovascular: Regular rhythm, normal heart sounds and intact distal pulses. Tachycardia present. Exam reveals no gallop.   No murmur heard.  Pulmonary/Chest: Accessory muscle usage present. Tachypnea noted. He is in  "respiratory distress. He has decreased breath sounds. He has wheezes. He has no rhonchi. He has rales.   Abdominal: Soft. Bowel sounds are normal. There is no tenderness.   Musculoskeletal: He exhibits edema. He exhibits no tenderness.   Neurological: He is alert. No cranial nerve deficit.   Skin: Skin is warm. No rash noted. He is not diaphoretic.       ED Course        Intubation  Date/Time: 1/11/2019 5:28 PM  Performed by: Brock Gloria MD  Authorized by: Brock Gloria MD   Consent: The procedure was performed in an emergent situation. Verbal consent obtained.  Risks and benefits: risks, benefits and alternatives were discussed  Patient identity confirmed: verbally with patient  Time out: Immediately prior to procedure a \"time out\" was called to verify the correct patient, procedure, equipment, support staff and site/side marked as required.  Indications: respiratory failure  Intubation method: video-assisted  Patient status: paralyzed (RSI)  Preoxygenation: BVM  Pretreatment medications: none  Sedatives: etomidate  Paralytic: rocuronium  Laryngoscope size: Mac 3  Tube size: 7.5 mm  Tube type: cuffed  Number of attempts: 1  Cricoid pressure: no  Cords visualized: yes  Post-procedure assessment: ETCO2 monitor and chest rise  Breath sounds: equal and absent over the epigastrium  Cuff inflated: yes  ETT to lip: 25 cm  Tube secured with: ETT najera  Chest x-ray interpreted by radiologist.  Chest x-ray findings: endotracheal tube in appropriate position  Patient tolerance: Patient tolerated the procedure well with no immediate complications                   EKG Interpretation:      Interpreted by Brock Gloria MD  Time reviewed: 13:09   Symptoms at time of EKG: Shortness of breath   Rhythm: Sinus tachycardia  Rate: 114 bpm  Axis: Left Axis Deviation  Ectopy: None  Conduction: Inferior infarct, age undetermined  ST Segments/ T Waves: No ST-T wave changes  Q Waves: None  Comparison to prior: No acute changes "     Clinical Impression: No acute changes         Critical Care time:  was 45 minutes for this patient excluding procedures.         Results for orders placed or performed during the hospital encounter of 01/11/19 (from the past 24 hour(s))   CBC with platelets differential     Status: Abnormal    Collection Time: 01/11/19 12:54 PM   Result Value Ref Range    WBC 9.0 4.0 - 11.0 10e9/L    RBC Count 2.84 (L) 4.4 - 5.9 10e12/L    Hemoglobin 7.9 (L) 13.3 - 17.7 g/dL    Hematocrit 26.5 (L) 40.0 - 53.0 %    MCV 93 78 - 100 fl    MCH 27.8 26.5 - 33.0 pg    MCHC 29.8 (L) 31.5 - 36.5 g/dL    RDW 15.6 (H) 10.0 - 15.0 %    Platelet Count 277 150 - 450 10e9/L    Diff Method Automated Method     % Neutrophils 73.8 %    % Lymphocytes 12.6 %    % Monocytes 7.3 %    % Eosinophils 5.8 %    % Basophils 0.2 %    % Immature Granulocytes 0.3 %    Nucleated RBCs 0 0 /100    Absolute Neutrophil 6.6 1.6 - 8.3 10e9/L    Absolute Lymphocytes 1.1 0.8 - 5.3 10e9/L    Absolute Monocytes 0.7 0.0 - 1.3 10e9/L    Absolute Eosinophils 0.5 0.0 - 0.7 10e9/L    Absolute Basophils 0.0 0.0 - 0.2 10e9/L    Abs Immature Granulocytes 0.0 0 - 0.4 10e9/L    Absolute Nucleated RBC 0.0     Platelet Estimate Confirming automated cell count    INR     Status: Abnormal    Collection Time: 01/11/19 12:54 PM   Result Value Ref Range    INR 1.23 (H) 0.86 - 1.14   Partial thromboplastin time     Status: Abnormal    Collection Time: 01/11/19 12:54 PM   Result Value Ref Range    PTT 38 (H) 22 - 37 sec   Comprehensive metabolic panel     Status: Abnormal    Collection Time: 01/11/19 12:54 PM   Result Value Ref Range    Sodium 135 133 - 144 mmol/L    Potassium 4.4 3.4 - 5.3 mmol/L    Chloride 98 94 - 109 mmol/L    Carbon Dioxide 28 20 - 32 mmol/L    Anion Gap 9 3 - 14 mmol/L    Glucose 146 (H) 70 - 99 mg/dL    Urea Nitrogen 27 7 - 30 mg/dL    Creatinine 5.32 (H) 0.66 - 1.25 mg/dL    GFR Estimate 9 (L) >60 mL/min/[1.73_m2]    GFR Estimate If Black 11 (L) >60  mL/min/[1.73_m2]    Calcium 8.0 (L) 8.5 - 10.1 mg/dL    Bilirubin Total 0.6 0.2 - 1.3 mg/dL    Albumin 3.1 (L) 3.4 - 5.0 g/dL    Protein Total 7.8 6.8 - 8.8 g/dL    Alkaline Phosphatase 87 40 - 150 U/L    ALT 13 0 - 70 U/L    AST 29 0 - 45 U/L   Troponin I     Status: None    Collection Time: 01/11/19 12:54 PM   Result Value Ref Range    Troponin I ES <0.015 0.000 - 0.045 ug/L   ISTAT gases lactate mallorie POCT     Status: Abnormal    Collection Time: 01/11/19 12:56 PM   Result Value Ref Range    Ph Venous 7.45 (H) 7.32 - 7.43 pH    PCO2 Venous 42 40 - 50 mm Hg    PO2 Venous 33 25 - 47 mm Hg    Bicarbonate Venous 30 (H) 21 - 28 mmol/L    O2 Sat Venous 66 %    Lactic Acid 1.2 0.7 - 2.1 mmol/L   Blood culture     Status: None (Preliminary result)    Collection Time: 01/11/19  1:01 PM   Result Value Ref Range    Specimen Description Blood VAD Collection     Special Requests Received in aerobic bottle only     Culture Micro PENDING    Blood culture     Status: None (Preliminary result)    Collection Time: 01/11/19  1:01 PM   Result Value Ref Range    Specimen Description Blood VAD Collection     Special Requests Received in aerobic bottle only     Culture Micro PENDING    EKG 12-lead, tracing only     Status: None    Collection Time: 01/11/19  1:10 PM   Result Value Ref Range    Interpretation ECG Click View Image link to view waveform and result    XR Chest Port 1 View     Status: None    Collection Time: 01/11/19  1:28 PM    Narrative    XR CHEST PORT 1 VW, 1/11/2019 1:28 PM.    Comparison: 3/31/2017.    History: sob.    Technique: PA and lateral chest radiographs    Findings:   Right chest dual-lumen central venous catheter with tip at the  superior tracheal junction. Left chest subclavian line with tip at the  superior ventricular ejection. Left innominate vein stent, stable.   Cardiomediastinal silhouette is enlarged, stable. Pulmonary  vasculature is distinct. Worsened opacification of the right  hemithorax since  comparison 3/21/2017. Right apical pleural  thickening. Streaky left basilar opacities,  likely atelectasis. Small  bilateral pleural effusions with overlying patchy attenuation. No  pneumothorax.  No acute intra-abdominal abnormalities.      Impression    Impression:     1. Patchy opacification of the right hemithorax concerning for  infection, less likely atelectasis, fibrosis.  2. Bilateral small pleural effusions with overlying consolidations,  likely atelectasis.o    I have personally reviewed the examination and initial interpretation  and I agree with the findings.    NAJMA VARGAS MD           Labs Ordered and Resulted from Time of ED Arrival Up to the Time of Departure from the ED   CBC WITH PLATELETS DIFFERENTIAL - Abnormal; Notable for the following components:       Result Value    RBC Count 2.84 (*)     Hemoglobin 7.9 (*)     Hematocrit 26.5 (*)     MCHC 29.8 (*)     RDW 15.6 (*)     All other components within normal limits   INR - Abnormal; Notable for the following components:    INR 1.23 (*)     All other components within normal limits   PARTIAL THROMBOPLASTIN TIME - Abnormal; Notable for the following components:    PTT 38 (*)     All other components within normal limits   COMPREHENSIVE METABOLIC PANEL - Abnormal; Notable for the following components:    Glucose 146 (*)     Creatinine 5.32 (*)     GFR Estimate 9 (*)     GFR Estimate If Black 11 (*)     Calcium 8.0 (*)     Albumin 3.1 (*)     All other components within normal limits   ISTAT  GASES LACTATE CANDIDO POCT - Abnormal; Notable for the following components:    Ph Venous 7.45 (*)     Bicarbonate Venous 30 (*)     All other components within normal limits   TROPONIN I   ROUTINE UA WITH MICROSCOPIC   CARDIAC CONTINUOUS MONITORING   ISTAT TROPONIN NURSING POCT   PATIENT CARE ORDER   ISTAT ACT NURSING POCT   BLOOD CULTURE   BLOOD CULTURE   URINE CULTURE AEROBIC BACTERIAL       Consults  Intensivist: Responded (01/11/19 5473)  Other (Comment):  Responded(renal) (01/11/19 1320)    Assessments & Plan (with Medical Decision Making)  Acute respiratory failure with poss volume overload, pneumonia in the pt with ESRD on HD M/W/F ?missed M and W per patient when he was in resp distress but later his wife thinks he did the runs, CXR right side PNA-cx'ed and started zosyn, HD stable and lactate ok, D/W Renal fellow for HD-to be arreanged, and also MICU staff-admit to MICU.       I have reviewed the nursing notes.    I have reviewed the findings, diagnosis, plan and need for follow up with the patient.       Medication List      There are no discharge medications for this visit.         Final diagnoses:   Acute respiratory failure with hypoxia (H)   Hypervolemia, unspecified hypervolemia type   ESRD (end stage renal disease) on dialysis (H)   Pneumonia of right lung due to infectious organism, unspecified part of lung     IAleisha, am serving as a trained medical scribe to document services personally performed by Brock Gloria MD, based on the provider's statements to me.   IBrock MD, was physically present and have reviewed and verified the accuracy of this note documented by Aleisha Jauregui.    1/11/2019   [facility], [place], EMERGENCY DEPARTMENT     Brock Gloria MD  01/11/19 0678

## 2019-01-11 NOTE — LETTER
January 14, 2019          To whom it may concern:          Please excuse                                             from work/school during this difficult time such that they may spend time with their loved ones.           Thank you,          Vanessa Fregoso MD

## 2019-01-11 NOTE — PROGRESS NOTES
[facility], [place]  Procedure Note           Intubation:       Angle Munguia  MRN# [id number]   January 11, 2019, 1:20 PM Indication: Respiratory failure  Respiratory distress           Patient intubated at: January 11, 2019, 1:20 PM   Patient informed of: Why intubation was required   Informed consent: Obtained   Cervical spine: Was stabilized during the procedure   Sedative medication: Was administered during the procedure   Technique used: Fiberoptic visualization with GlideScope   Endotracheal tube size: 7.5 cm with cuff   Number of attempts: 1   Placement confirmed by: Auscultation of bilateral breath sounds  Visualization of bilateral chest wall rise  End-tidal CO2 monitor  Chest X-ray   ET tube repositioning: Was performed   Tube secured at: 25 cm @lip      This procedure was performed without difficulty and he tolerated the procedure well with no complications.      Recorded by Arun Martin

## 2019-01-12 NOTE — PHARMACY
Pharmacy Tube Feeding Consult    Medication reviewed for administration by feeding tube and for potential food/drug interactions.    Recommendation: No changes are needed at this time.     Pharmacy will continue to follow as new medications are ordered.    Juany Dickson, ChiquitaD

## 2019-01-12 NOTE — PROGRESS NOTES
Nephrology Progress Note  01/12/2019         Assessment & Recommendations:   Angle Munguia is a 83 year old male with PMH of ESRD, HTN, DM2, GERD who was seen hypoxic in dialysis today and was sent to ED for respiratory failure intubated in ED now on vents and treated for possible pneumonia.      ESRD: on HD due to DM,HTN, dialysis at [facility], under care of Dr John hodges, MWF, tunnel cathter 3hr 15 min, EDW 62.5kg.  ----  Given hyperkalemia, and lack of dialysis since Wed, will initiate CRRT for clearance and to help manage volume.      Access: Right internal jugular tunnel cathter being used now, left AVG hero graft recently revised.      Acute respiratory failure: intubated due to probable bacterial pneumonia.     BP and volume:   Septic Shock -on pressor support. He is ~3L positive. Once BP improves, can trial some UF.     Electrolyte and acid base: Hyperkalemia - should improve with CRRT      Anemia: Hgb stable in the mid 7's without obvious clinical bleeding. Will restart EPO once transitioned to HD.       PLAN  1. Initiate CRRT  2. I=O for the next 24 hours    Recommendations were communicated to primary team via face to face.    Seen and discussed with Dr. Padilla.    Chema Esteves MD   [phone]    Interval History :   Nursing and provider notes from last 24 hours reviewed.  Mr. Munguia remains on vasopressor support for BP and mech ventilation for resp failure. He has had rise in lactic acid and potassium. He has remained febrile. He is roughly 3L + since admission.    Review of Systems:   Unable to obtain due to intubated/sedated status.     Physical Exam:   I/O last 3 completed shifts:  In: 3838.46 [I.V.:1109.46; Other:229; IV Piggyback:2250]  Out: 0    /82   Pulse 83   Temp 98.8  F (37.1  C) (Axillary)   Resp 15   Wt 62 kg (136 lb 11 oz)   SpO2 100%   BMI 25.83 kg/m         GENERAL APPEARANCE: intubated and sedated  HEENT: ET Tube in place  Lymphatics: no cervical or  supraclavicular LAD  Pulmonary: mech vent, crackles bilaterally, air entry bilaterally  CV: RRR   - Edema 1+  GI: soft, nontender, normal bowel sounds  MS: no evidence of inflammation in joints, no muscle tenderness  SKIN: no rash, warm, dry, no cyanosis  NEURO: intubated and sedated  Access: right Tunnel cathter, left hero AVG with suture over the site no bleeding, good thrill      Labs:   All labs reviewed by me  Electrolytes/Renal -   Recent Labs   Lab Test 01/12/19  0856 01/12/19  0527 01/12/19  0513 01/12/19  0150  12/31/18  0800  12/21/15  0933  06/23/14  0326 06/22/14  0356    137  --  138   < > 133   < > 138   < > 142 142   POTASSIUM 5.8* 5.7* 6.2* 5.5*   < > 5.8*   < > 4.0   < > 4.1 3.6   CHLORIDE 102 103  --  102   < > 98   < > 100   < > 98 97   CO2 16* 18*  --  24   < > 25   < > 29   < > 30 39*   BUN 37* 33*  --  32*   < > 56*   < > 28   < > 30 19   CR 5.24* 5.10*  --  5.04*   < > 8.70*   < > 8.12*   < > 5.50* 4.19*   * 84  --  100*   < > 106*   < > 115*   < > 94 100*   SHIRA 7.4* 7.2*  --  7.0*   < > 7.9*   < > 6.8*   < > 8.2* 8.3*   MAG 1.5*  --   --   --   --   --   --   --   --  2.0 1.8   PHOS 5.3*  --   --   --   --  3.4  --  3.1   < > 4.8* 2.7    < > = values in this interval not displayed.       CBC -   Recent Labs   Lab Test 01/12/19  0856 01/12/19 0527 01/12/19  0150   WBC 18.8* 17.5* 10.7   HGB 7.6* 7.6* 7.4*    320 306       LFTs -   Recent Labs   Lab Test 01/12/19  0856 01/12/19  0150 01/11/19  1254   ALKPHOS 119 119 87   BILITOTAL 1.2 1.0 0.6   * 149* 13   AST 2,858* 610* 29   PROTTOTAL 6.6* 6.0* 7.8   ALBUMIN 2.7* 2.4* 3.1*       Iron Panel -   Recent Labs   Lab Test 12/28/18  0805 12/21/15  1722 06/20/14  0404   IRON 19* 37 13*   IRONSAT 10* 24 7*   NATALY 607* 491* 1,374*     Imaging:  All imaging studies reviewed by me.     Current Medications:    atropine         B and C vitamin Complex with folic acid  5 mL Per Feeding Tube Daily     calcium acetate  1,334 mg Oral  or Feeding Tube TID w/meals     calcium gluconate  1 g Intravenous Once     heparin  5,000 Units Subcutaneous Q8H     [START ON 1/13/2019] influenza Vac Split High-Dose  0.5 mL Intramuscular Prior to discharge     insulin aspart  1-3 Units Subcutaneous Q4H     ipratropium  0.5 mg Nebulization Q4H While awake     lactated ringers  500 mL Intravenous Once     [START ON 1/13/2019] levETIRAcetam  500 mg Oral or Feeding Tube Daily     levofloxacin  750 mg Intravenous Q24H     [START ON 1/13/2019] pantoprazole  40 mg Oral or Feeding Tube QAM AC     piperacillin-tazobactam  4.5 g Intravenous Q6H     protein modular  1 packet Per Feeding Tube BID     vancomycin place najera - receiving intermittent dosing  1 each Intravenous See Admin Instructions       IV fluid REPLACEMENT ONLY       CRRT replacement solution 12.5 mL/kg/hr (01/12/19 1037)     DOPamine 10 mcg/kg/min (01/12/19 1325)     EPINEPHrine IV infusion ADULT Stopped (01/12/19 0915)     fentaNYL 50 mcg/hr (01/12/19 1300)     - MEDICATION INSTRUCTIONS -       norepinephrine 0.22 mcg/kg/min (01/12/19 1300)     CRRT replacement solution 3.236 mL/kg/hr (01/12/19 1038)     CRRT replacement solution 12.5 mL/kg/hr (01/12/19 1038)     propofol (DIPRIVAN) infusion 20 mcg/kg/min (01/12/19 1300)     vasopressin (PITRESSIN) infusion ADULT (40 mL) 2.4 Units/hr (01/12/19 1300)     Chema Esteves MD

## 2019-01-12 NOTE — PROCEDURES
PROCEDURE:   Ultrasound guided right internal jugular central venous catheter.     INDICATION:  Septic shock.    PROCEDURE :   Erica Rodriguez    SUPERVISOR:  Suhas Castro    CONSENT:  Obtained and in the paper chart    UNIVERSAL PROTOCOL: Patient Identification was verified, time out was performed, site marking was done. Imaging data reviewed. Full Barrier precaution done: Hands washed, mask, gloves, gown, cap and eye protection all used.     PROCEDURE SUMMARY:   A time-out was performed. The patient's right neck region was prepped and draped in sterile fashion. Anesthesia was achieved with 1% lidocaine. The introducer needle was then inserted and visualized to be in the vein on ultrasound and venous blood was withdrawn into the syringe. The syringe was removed and the guidewire was advanced through the introducer needle. Ultrasound again documented a wire within the vein.  The introducer needle was removed and a small incision was made with a scalpel over the wire.  A dilator was advanced over the guidewire until appropriate dilation was obtained. The dilator was removed and a triple-lumen catheter was advanced over the guidewire and secured into place with 2 sutures. At time of procedure completion, all ports aspirated and flushed properly. Post-procedure x-ray has been orderded  Dr Castro was available for the key portions of the procedure.    COMPLICATIONS:  None    ESTIMATED BLOOD LOSS: 5-10mL      KAREN Cedeño  Internal Medicine  [phone]

## 2019-01-12 NOTE — PROGRESS NOTES
CRRT INITIATION NOTE    Consent for CRRT Completed: yes  Patient s Vascular Access: Catheter              Placement Confirmed: yes  Manufacture:  RentMonitor  Model:  Palindrome  Length/Citizen of [place] Size: 14.5 fr/19cm  Flush Volume:  1.6cc    DATA:  Procedure:  CVVHDF  Start Time:  1144  Machine#:  3  Filter:  M150  Blood Flow:  200 ML/min  Pre-Replacement Solution:  Phox BK 4/2.5  Post-Replacement Solution:  Phox BK 4/2.5  Dialysate Solution:  Phox BK 4/2.5  Pre-Replacement Solution Rate:  800 mL/hr  Post-Replacement Solution Rate:  200 mL/hr  Dialysate Flow Rate:  800 mL/hr   Patient Removal Rate:  0 mL/hr  Anticoagulation Type and Rate:  None    ASSESSMENT:  How Patient Tolerated Initiation:  well  Vital Signs:  Temp 101.3, , BP 89/44, Sats 100%  Initial Pressures:  Access:  -30  Filter:  114  Return:  70  TMP:  49  Change in Filter Pressure:  20      INTERVENTIONS:  CRRT initiated.  Line already in place. Blood warmer applied.      PLAN:  Continue to pull I=O as able.  Contact [phone] with questions/concerns.

## 2019-01-12 NOTE — PROGRESS NOTES
"Bronchoscopy Risk Assessment Guidelines      A. Patient symptoms to consider when assessing pulmonary TB risk are:    I. Cough greater than 3 weeks; and fever, hemoptysis, pleuritic chest    pain, weight loss greater than 10 lbs, night sweats, fatigue, infiltrates on    upper lobes or superior segments of lower lobes, cavitation on chest    x-ray.   B. Patient risk factors to consider when assessing pulmonary TB risk are:    I. Exposure to known TB case, foreign-born persons (within 5 years of    arrival to [place]), residence in a crowded setting (correctional facility,     long-term care center, etc.), persons with HIV or immunosuppression.    Patients with symptoms and risk factors should generally be considered \"suspect risk\" and bronchoscopies should be performed in airborne precautions.    This patient has NO KNOWN RISK of Tuberculosis (proceed with bronchoscopy)    Specimens sent: yes  Complications: None  Scope used: #[id number] Slim  Attending Physician: Dr. Rohan Cuellar, RT on 1/12/2019 at 5:19 PM  "

## 2019-01-12 NOTE — PROGRESS NOTES
HEMODIALYSIS TREATMENT NOTE    Date: 1/11/2019  Time: 6:07 PM    Data:  Pre Wt:  61.8   Desired Wt: 61.3 kg   Post Wt: 62   Weight gain: 0.2   kg   Weight change:+0.2   kg  Ultrafiltration - Post Run Net Total Removed (mL): 71mL  Ultrafiltration - Post Run Net Total Gain (mL): 229 mL  Vascular Access Status: Yes, secured and visible  Dialyzer Rinse: Clotted, Light  Total Blood Volume Processed: 15.3  Total Dialysis (Treatment) Time: 1 hr     Lab:   Yes, Hep B labs sent    InterventionsAssessment: pt intubated and sedated with very poor lung sounds. Pt required frequent suctioning of with pink foam being removed. Pt's blood pressure below parameters before start of TX per renal team propofol gtt titrated down and BP improved. Upon start of tx pt's blood pressure again went down. UF turned off per renal team. Pt still did not tolerate treatment well. Decision made to rinse back and try again tomorrow or possibly start CRRT. Catheter dressing changed as outside faciltiy dressing was in place. Exit site of catheter has large scab. Handoff given to floor ERICA Magana.       Plan:    Will continue to monitor and follow POC per renal team.

## 2019-01-12 NOTE — PROGRESS NOTES
MICU Progress Note  Angle Munguia MRN: [id number]  1935  Date of Admission:1/11/2019  Primary care provider: La Nena Garcia      ASSESSMENT & PLAN :    Angle Munguia is a 83 year old male with a PMHx of ESRD, HTN, DM II, gout admitted with acute hypoxic respiratory failure requiring intubation with onset during dialysis on 1/11. He is currently on three pressors and being treated for a bacterial community acquired pneumonia.    CHANGES TODAY:  - Add levofloxacin, change lindsay to pip-satinder  - Repeat blood cultures  - Bronch, diagnostic  - Resp viral panel  - Added acetaminophen for fevers  - Add norepi and try to wean from dopamine and epinephrine  - Urine antigens (if possible to obtain) for legionella and strep penumo  - MAP goal changed back to 65  - CRRT  - Ipratropium changed to PRN  - AM cortisol to look for other causes of shock  - Low dose sliding scale insulin  - Trend lactate  - Aim I=O for CRRT  - TV changed to 550  - Repeat ABG after vent change  - Mg replacement    ===NEURO===  # Sedation/Analgesia  Propofol and fentanyl    # History of seizures  Previously on levetiracetam 500qday per chart review.  - Continue PTA levetiracetam    ===CARDIOVASCULAR===    # Shock  Suspected 2/2 sepsis from pneumonia, hypovolemia. Given 500 ml LR bolus x 2 with improvements in MAPs from upper 50s to 70s. Troponin negative in ED. Has now required three pressors and received further fluids overnight. Cards did a bedside echo overnight and by verbal report, was normal  - Add norepi and attempt to titrate off dopamine and epinephrine  - Target MAPs of 65  - Hold PTA antihypertensives  - Formal TTE    # TTE in 2014  - Normal EF (55-60%) without valvular dysfunction    #. Bradycardia, resolved  Had episodes of junctional rhythm overnight, rates in 40s. Pt likely unable to tolerate lower HR given sepsis. May have been due to propofol/dexmedetomidine. Currently rates 110s- more appropriate given sepsis  - Hold PTA  metoprolol      ===RESPIRATORY===    # Acute hypoxic respiratory failure  #. Bacterial pneumonia  #. Concern for developing ARDS  Patient reportedly hypoxic at outpatient dialysis preceding presentation with intubation in ED. 2/2 CAP. No risk factors for resistant organisms. BCs NGTD, sputum NGTD. Repeat CXR shows continued infiltrates in right lung and some possible worsening in left, raising concern for developing ARDS, although is not requiring high ventilatory support, which would argue against ARDS.  - Continue mechanical ventilation  - CMV, RR 16, , PEEP 7, FiO2 45%  - Trend ABGs  - Diagnostic bronch today  - Follow up sputum culture  - Legionella and strep pneumo antigens  - RVP  - Repeat blood cultures  - Ipratropium to PRN instead of scheduled  - Add levofloxacin and change meropenem to pip-satinder      Ventilation Mode: CMV/AC  (Continuous Mandatory Ventilation/ Assist Control)  FiO2 (%): 45 %  Rate Set (breaths/minute): 16 breaths/min  Tidal Volume Set (mL): 450 mL--changed to 550  PEEP (cm H2O): 7 cmH2O  Oxygen Concentration (%): (S) 40 %  Resp: 26      SpO2: 100 %   O2 Device: Mechanical Ventilator           ===GASTROINTESTINAL===    # GERD  On PTA omperazole  - Pantoprazole PO daily while on vent    # Nutrition  Plan to initiate tube feeds on 1/12.  - Nutrition consult placed      ===RENAL / ELECTROLYTES===    # ESRD on HD  Previously with malfunctioning dialysis fistula in December 2016 s/p thrombectomy 12/30. CVC RIJ tunneled catheter placed 12/28. Per nephrology, estimated dry weight of 62.5 kg. Weight on admission 61.8 kg. Unclear if this represents error in weight or if patient truly volume down. Does not appear significantly volume overloaded on exam. Partial dialysis done at [facility] 1/11  - Nephrology following with recommendations as below  - CRRT today; aim I=O      ===INFECTIOUS DISEASE===       # Suspected PNA   # Acute hypoxic respiratory failure  Now febrile, WBC 17.5, procal 6.73. CXR  suggestive of pulmonary edema, infection, ARDS, or other diffuse alveolar damage. Started on pip-satinder in the ED with addition of vancomycin. Lactate 5.2, now downtrending. Was changed to lindsay and azithro overnight  - Continue vanc, pip-satinder; stop lindsay, start levofloxacin for atypical coverage  - trend CBC, lactate  - Rest as above under pulm        ===HEMATOLOGY===  # Anemia  Likely anemia of chronic kidney disease. Hb stable from prior on discharge earlier in the month. Ferritin high (acute reactant protein), iron normal, TIBC low.  - trend Hb      ===ENDOCRINE===  # DM II  BGs variable  - Start low dose SSI      ===SKIN/RHEUM/MSK===  # Gout  - Hold PTA allopurinol for now    GI PPx: Pantoprazole  DVT PPx: subcutaneous heparin  CODE: Full    Patient seen and discussed with staff attending, Dr. Madrigal, who agrees with above assessment and plan.        KAREN Fraser  PGY 2 Internal Medicine  [phone]      Attending note:  Patient seen, examined and discussed with the Resident physicians. All data reviewed. Agree with assessment and plan as outlined in the above note.  The patient remains critically ill with ESRD, acute respiratory failure, septic shock and pneumonia.  Remains dependent on vasopressors, continued ventilatory support.  Will perform bronchoscopy today to determine cause of pneumonia, continue broad antibiotic coverage.    Total Critical Care time 40 minutes    Khloe Madrigal MD  [phone]        ______________________________________________________________________  PHYSICAL EXAM  Temp  Av.4  F (36.9  C)  Min: 97.6  F (36.4  C)  Max: 101.5  F (38.6  C)  Arterial Line BP  Min: 60/41  Max: 107/52  Arterial Line MAP (mmHg)  Av.4 mmHg  Min: 48 mmHg  Max: 73 mmHg      Pulse  Av.2  Min: 49  Max: 132 Resp  Av.7  Min: 11  Max: 43  FiO2 (%)  Av %  Min: 40 %  Max: 45 %  SpO2  Av.8 %  Min: 41 %  Max: 100 %         Intake/Output Summary (Last 24 hours) at 2019 0901  Last data filed at  1/12/2019 0800  Gross per 24 hour   Intake 4040.25 ml   Output 0 ml   Net 4040.25 ml     Wt Readings from Last 4 Encounters:   01/11/19 62 kg (136 lb 11 oz)   01/02/19 64.1 kg (141 lb 5 oz)   11/21/18 62.8 kg (138 lb 8 oz)   09/20/18 54.4 kg (120 lb)       Ventilation Mode: CMV/AC  (Continuous Mandatory Ventilation/ Assist Control)  FiO2 (%): 45 %  Rate Set (breaths/minute): 16 breaths/min  Tidal Volume Set (mL): 450 mL  PEEP (cm H2O): 7 cmH2O  Oxygen Concentration (%): (S) 40 %  Resp: 26    Arterial Line BP: ()/(41-58) 86/50  MAP:  [48 mmHg-73 mmHg] 61 mmHg  BP - Mean:  [] 117    GEN: elderly, ill. Sedated  EYES: PERRL, Anicteric sclera.   CV: Mild tachcyardia, rhythm regular, HS I+II+0  PULM/CHEST: CTAB, symmetric chest rise. Ventilator.  GI: normal bowel sounds, non-tender, no rebound tenderness or guarding, no masses  : maxwell catheter in place  EXTREMITIES: No pedal edema, moving all extremities, peripheral pulses intact  NEURO: Sedated  SKIN: No rashes, sores or ulcerations      DIAGNOSTIC STUDIES  ROUTINE ICU LABS (Last four results)  CMP  Recent Labs   Lab 01/12/19  0527 01/12/19  0513 01/12/19  0150 01/11/19  1254     --  138 135   POTASSIUM 5.7* 6.2* 5.5* 4.4   CHLORIDE 103  --  102 98   CO2 18*  --  24 28   ANIONGAP 16*  --  12 9   GLC 84  --  100* 146*   BUN 33*  --  32* 27   CR 5.10*  --  5.04* 5.32*   GFRESTIMATED 10*  --  10* 9*   GFRESTBLACK 11*  --  11* 11*   SHIRA 7.2*  --  7.0* 8.0*   PROTTOTAL  --   --  6.0* 7.8   ALBUMIN  --   --  2.4* 3.1*   BILITOTAL  --   --  1.0 0.6   ALKPHOS  --   --  119 87   AST  --   --  610* 29   ALT  --   --  149* 13     CBC  Recent Labs   Lab 01/12/19  0527 01/12/19  0150 01/11/19  1817 01/11/19  1254   WBC 17.5* 10.7  --  9.0   RBC 2.74* 2.66*  --  2.84*   HGB 7.6* 7.4*  --  7.9*   HCT 26.1* 25.7*  --  26.5*   MCV 95 97  --  93   MCH 27.7 27.8  --  27.8   MCHC 29.1* 28.8*  --  29.8*   RDW 15.9* 15.8*  --  15.6*    306 258 277     INR  Recent  Labs   Lab 01/11/19  1254   INR 1.23*     Arterial Blood Gas  Recent Labs   Lab 01/12/19  0513 01/12/19  0150 01/11/19  1835   PH 7.34*  --  7.45   PCO2 36  --  38   PO2 64*  --  147*   HCO3 19*  --  26   O2PER 40.0 40.0 45.0

## 2019-01-12 NOTE — PLAN OF CARE
Pt remains in bilateral soft wrist restraints. Order active. Pt becomes very agitated and tries to pull out ETT is not redirectable. Education performed with no evidence of learning. No signs of injury. Continue to monitor and address changes.

## 2019-01-12 NOTE — PROCEDURES
"Insert arterial line  Date/Time: 1/12/2019 7:22 AM  Performed by: Servando Nogueira MD  Authorized by: Servando Nogueira MD   Consent: Verbal consent not obtained. Written consent obtained.  Risks and benefits: risks, benefits and alternatives were discussed  Consent given by: power of   Relevant documents: relevant documents present and verified  Imaging studies: imaging studies available  Patient identity confirmed: arm band  Time out: Immediately prior to procedure a \"time out\" was called to verify the correct patient, procedure, equipment, support staff and site/side marked as required.  Preparation: Patient was prepped and draped in the usual sterile fashion.  Indications: multiple ABGs, respiratory failure and hemodynamic monitoring  Location: left radial  Anesthesia: local infiltration    Sedation:  Patient sedated: yes  Sedatives: fentanyl and propofol    Gil's test normal: yes  Needle gauge: 18  Number of attempts: 1  Post-procedure: line sutured and dressing applied  Post-procedure CMS: normal  Patient tolerance: Patient tolerated the procedure well with no immediate complications  Comments: Consent taken from son in law and daughter. Risks and benefits explained. In agreement.  Jeaneth Dowling, PGY-3    [phone]          "

## 2019-01-12 NOTE — PLAN OF CARE
D: Remains intubated and on mechanical ventilation with hypotension.  IA: Unable to tolerate hemodialysis and SBP down to 60's between 1830 and 1900. MDs notified and IV fluid boluses given with good response.   P: Continue with full support. Assess and monitor closely.

## 2019-01-12 NOTE — PROGRESS NOTES
"CLINICAL NUTRITION SERVICES - ASSESSMENT NOTE     Nutrition Prescription    RECOMMENDATIONS FOR MDs/PROVIDERS TO ORDER:  Free water flush adjustment per MD discretion pending sodium and fluid status    Malnutrition Status:    Patient does not meet criteria necessary for diagnosing malnutrition    Recommendations already ordered by Registered Dietitian (RD):  1. Once pt has been bronched, start TF via OGT:  -Nepro @ 10 mL/hr  -If pt tolerates, adv TF by 10 mL q8h to goal 35 ml/hr (840 ml/day) to provide 1512 kcals (28+ kcal/kg/day), 68 g PRO (1.3+ g/kg/day), 613 ml free H2O, 135 g CHO and 11 g Fiber daily.  -2 packets Prosource daily to provide an additional 80 kcal and 22 g PRO to increase total provisions to 1592 kcal (29 kcal/kg) and 90 g PRO (1.7 g/kg)  -Order renal multivitamin (Nephronex 5 ml/day) to replace water soluble vitamins lost with dialysis therapy.  -30 mL water flush q4h for tube patency  -Assess gastric residuals and HOB >30 degrees while feeding stomach    Future/Additional Recommendations:  1. TF start, adv, lytes    2. Gastric feed tolerance     REASON FOR ASSESSMENT  Angle Munguia is a/an 83 year old male assessed by the dietitian for Provider Order - Registered Dietitian to Assess and Order TF per Medical Nutrition Therapy Protocol    NUTRITION HISTORY  Per pt's family member, he was eating normally up until yesterday when he was intubated.    CURRENT NUTRITION ORDERS  Diet: NPO  Intake/Tolerance: N/A    LABS  1/12:  K = 5.7 (H)  Creatinine = 5.1 (H)  ALT = 149 (H)  AST = 610 (H)    MEDICATIONS  Norepinephrine  Propofol  Vasopressin    ANTHROPOMETRICS  Height: 5'1\"  Most Recent Weight: 62 kg (136 lb 11 oz)    IBW: 50.9 kg  BMI: 26; Overweight BMI 25-29.9  Weight History:   Wt Readings from Last 10 Encounters:   01/11/19 62 kg (136 lb 11 oz)   01/02/19 64.1 kg (141 lb 5 oz)   11/21/18 62.8 kg (138 lb 8 oz)   09/20/18 54.4 kg (120 lb)   05/31/18 56.7 kg (125 lb)   08/31/17 59.1 kg (130 lb 4.7 oz) "   08/17/17 54.4 kg (120 lb)   03/31/17 61.5 kg (135 lb 9.6 oz)   12/22/15 62.1 kg (137 lb)   03/03/15 58.1 kg (128 lb)   No significant weight loss in the recent past  Dosing Weight: 54 kg (adjusted based on lowest admit wt of 61.8 kg on 1/11, IBW = 50.9 kg)    ASSESSED NUTRITION NEEDS  Estimated Energy Needs: 8179-9681 kcals/day (25 - 30 kcals/kg)  Justification: Increased needs and Vented  Estimated Protein Needs: 65-97 grams protein/day (1.2 - 1.8 grams of pro/kg)  Justification: Dialysis and Increased needs  Estimated Fluid Needs: (1 mL/kcal)   Justification: Per provider pending fluid status    PHYSICAL FINDINGS  See malnutrition section below.  Poor skin turgor     MALNUTRITION  % Intake: Decreased intake does not meet criteria  % Weight Loss: None noted  Subcutaneous Fat Loss: None observed  Muscle Loss: Upper arm (bicep, tricep), Lower arm  (forearm), Patellar region and Posterior calf: Mild  Fluid Accumulation/Edema: None noted  Malnutrition Diagnosis: Patient does not meet two of the above criteria necessary for diagnosing malnutrition    NUTRITION DIAGNOSIS  Inadequate protein-energy intake related to intubation as evidenced by NPO day 2 with EN yet to start      INTERVENTIONS  Implementation  Nutrition education for nutrition relationship to health/disease, tentative nutrition plan, RD role to pt's family member  Collaboration with other providers  Enteral Nutrition - Initiate  Feeding tube flush  Multivitamin/mineral supplement therapy     Goals  Total avg nutritional intake to meet a minimum of 25 kcal/kg and 1.2 g PRO/kg daily (per dosing wt 54 kg).     Monitoring/Evaluation  Progress toward goals will be monitored and evaluated per protocol.    Cinthia Salas, RD, LD  SICU RD Pgr: [phone]

## 2019-01-12 NOTE — PLAN OF CARE
Pt remained intubated on 35-45% FIO2, PEEP of 7. Pt became hypotensive overnight started on Levo then transitioned to Vaso, Epi and Dopamine, Levophed was titrated off, ART line placed, received 1.75L LR. Pt had intermittent pauses overnight and became bradycardic, resolved with Epi. STAT 12 lead showed Junctional rhythm with retrograde conduction and an incomplete right BBB. Afebrile. Antibiotics switched overnight from Zosyn to Merrem, also received a dose of vanco and azithromycin overnight.  Lactic peaked overnight at 5.7. Pt had one episode of incontinence of urine overnight. Plan is to do dialysis tomorrow. Pt is NPO. Pt is sedated on Propofol, and in bilateral soft wrist restaints. Continue to monitor and address changes.  Adult Inpatient Plan of Care  Plan of Care Review  1/12/2019 0601 - Declining by Olive Correia RN  Patient-Specific Goal (Individualization)  1/12/2019 0601 - Declining by Olive Correia RN  Optimal Comfort and Wellbeing  1/12/2019 0601 - Declining by Olive Correia RN  Rounds/Family Conference  1/12/2019 0601 - Declining by Olive Correia RN

## 2019-01-12 NOTE — PHARMACY-VANCOMYCIN DOSING SERVICE
Pharmacy Vancomycin Consult Initial Note    Date of Service 2019  Patient's  1935  83 year old, male    Indication: Sepsis    Current estimated CrCl = iHD.    Creatinine for last 3 days  2019: 12:54 PM Creatinine 5.32 mg/dL    Recent vancomycin level(s) for last 3 days  No results found for requested labs within last 72 hours.    Body mass index is 25.83 kg/m .  Height is Data Unavailable.   Wt Readings from Last 2 Encounters:   19 62 kg (136 lb 11 oz)   19 64.1 kg (141 lb 5 oz)         Vancomycin IV Administrations (past 72 hours)                   vancomycin (VANCOCIN) 1,500 mg in sodium chloride 0.9 % 250 mL intermittent infusion (mg) 1,500 mg Started 19              Nephrotoxins and other renal medications (From now, onward)    Start     Dose/Rate Route Frequency Ordered Stop    19  piperacillin-tazobactam (ZOSYN) 2.25 g vial to attach to  ml bag      2.25 g  over 30 Minutes Intravenous EVERY 6 HOURS 19 1845      19 1641  vancomycin place najera - receiving intermittent dosing      1 each Intravenous SEE ADMIN INSTRUCTIONS 19 1642          Contrast Orders - past 72 hours (72h ago, onward)    None          Plan:  1.  Start vancomycin 1500 mg IV x 1 followed by intermittent dosing based on vancomycin levels. Will redose vancomycin once level confirmed or estimated to be <20 mg/L.   2.  Goal Trough Level: 15-20 mg/L   3.  Pharmacy will check trough levels as appropriate in 1-3 Days.    4.  Serum creatinine levels will be ordered daily for the first week of therapy and at least twice weekly for subsequent weeks.    5.  Granby method utilized to dose vancomycin therapy: dosing for hemodialysis.    Rosalinda Erazo, PharmD  2019

## 2019-01-12 NOTE — PROCEDURES
Bronchoscopy with BAL         Indication:   Infiltrates          Consent:   Obtained from the family            Pre-medication:   The patient is on mechanical ventilation   Lidocaine 1%: subglottic ml 6  Bolus medications: 50mcg fentanyl            Procedure Summary:   Time out was performed.   The bronchoscope inserted through ET tube      Airway examination:  Exam of trachea and bronchus of the right and left bronchial tree to the sub-segmental level revealed no endobronchial lesion.     Procedure:  Aspirated secretions from RML    The patient tolerated the procedure well without undue discomfort, hypotension or arrhythmia. The procedure was performed in the ICU and vital sign parameters were monitored.         Complications:   No immediate complications.  Sample sent for   -cell count   -cytology  -microbiology.     This procedure was performed by Micah Johnson MD, under direct supervision of   , Akil HERNADEZ MD      Attending note:  Bedside bronchoscopy for evaluation of right sided infiltrate in setting of acute respiratory failure.  Uncompleted procedure, BAL performed.  I was present during the entire viewing session from scope insertion to scope removal and assisted with the performance of the BAL.    Khloe Madrigal MD  [phone]

## 2019-01-12 NOTE — PLAN OF CARE
4C: Cxl - Per chart review and discussion with RN, pt on multiple pressors and not appropriately for therapy initiation. Will follow up and initiate as appropriate 1/14.

## 2019-01-12 NOTE — PROGRESS NOTES
Admitted/transferred from: ED  Reason for admission/transfer: Respiratory failure  Patient status upon admission/transfer: Intubated, sedated and on mechanical ventilation.  Interventions: Attempted hemodialysis.    Plan: Dialyze, antibiotics.  2 RN skin assessment: completed by Izzy Holley RN and Tiffanie Stanley RN  Result of skin assessment and interventions/actions: Skin over bony prominences intact. Skin on coccyx intact but very bony so preventive Mepilex applied.   Height, weight, drug calc weight: done  Patient belongings (see Flowsheet - Adult Profile for details): With family.  MDRO education (if applicable):

## 2019-01-12 NOTE — PLAN OF CARE
PT 4C: Will HOLD; OT to follow and will initiate PT when appropriate due to mental/medical status.

## 2019-01-12 NOTE — H&P
ASSESSMENT & PLAN     Angle Munguia is a 83 year old male with a PMHx of ESRD, HTN, DM II, gout admitted with acute hypoxic respiratory failure requiring intubation with onset during dialysis on .      ===NEURO===  # Sedation/Analgesia  Some hypotension following initiation of propofol  - propofol as BP tolerates    # History of seizures  Previously on keppra per chart review.  - Continue PTA keppra    ===CARDIOVASCULAR===    # Shock  Suspected 2/2 sepsis from pneumonia, hypovolemia. Given 500 ml LR bolus x 2 with improvements in MAPs from upper 50s to 70s. Troponin negative in ED.  - Fluid resuscitation with 500 ml boluses LR  - Target MAPs of 55 in this patient with ESRD  - Hold PTA antihypertensives  - TTE    # TTE in   - Normal EF (55-60%) without valvular dysfunction    BP  Min: 62/41  Max: 198/109  Pulse  Av  Min: 60  Max: 132   Recent Labs   Lab Test 19  1835 19  1256 12/19/15  2109   LACT 2.3* 1.2 2.3*            Rate/Rhythm  Pulse  Av  Min: 60  Max: 132   EKG:   QTc:       ===RESPIRATORY===    # Acute hypoxic respiratory failure  Patient reportedly hypoxic at outpatient dialysis preceding presentation with intubation in ED. 2/2 infection vs fluid overload  - Continue mechanical ventilation  - CMV, RR 16, , PEEP 7, FiO2 45%  - Trend ABGs  - Possible diagnostic bronch in AM  - Repeat CXR  - Follow up sputum culture      Ventilation Mode: CMV/AC  (Continuous Mandatory Ventilation/ Assist Control)  FiO2 (%): 45 %  Rate Set (breaths/minute): 16 breaths/min  Tidal Volume Set (mL): 450 mL  PEEP (cm H2O): 7 cmH2O  Oxygen Concentration (%): 35 %  Resp: (!) 33      SpO2: 96 %   O2 Device: Mechanical Ventilator       Recent Labs   Lab Test 19  1835 19  1256 19  1254 19  0753  10/06/17  1658  14  2347   PH 7.45  --   --   --   --   --   --  7.49*   PCO2 38  --   --   --   --   --   --  42   PO2 147*  --   --   --   --   --   --  89   O2PER 45.0   --   --   --   --   --   --  3L   PHV  --  7.45*  --   --   --  7.50*  --   --    PCO2V  --  42  --   --   --  46  --   --    CO2  --   --  28 26   < >  --    < >  --     < > = values in this interval not displayed.          ===GASTROINTESTINAL===    # GERD  On PTA omperazole  - Pantoprazole PO daily while on vent    # Nutrition  Plan to initiate tube feeds on 1/12.  - Nutrition consult placed    Recent Labs   Lab Test 01/11/19  1254 12/31/18  0800 12/26/18  1521 11/14/17  1259   AST 29  --  8 14   ALT 13  --  10 18   ALKPHOS 87  --  51 82   BILITOTAL 0.6  --  0.7 0.8   ALBUMIN 3.1* 2.5* 3.6 4.2            ===RENAL / ELECTROLYTES===    # ESRD on HD  Previously with malfunctioning dialysis fistula in December 2016 s/p thrombectomy 12/30. CVC RIJ tunneled catheter placed 12/28. Per nephrology, estimated dry weight of 62.5 kg. Weight on admission 61.8 kg. Unclear if this represents error in weight or if patient truly volume down. Does not appear significantly volume overloaded on exam.  - Nephrology following with recommendations as below  - Partial dialysis done at [facility] 1/11  - Attempt to finish HD following intubation (only able to complete 1 hr 2/2 hypotension)   - HD tomorrow if needed but if remain hypotensive will initiate CRRT.         Baseline creatinine:   Recent Labs   Lab Test 01/11/19  1254 01/02/19  0753 01/01/19  0736 12/31/18  0800   BUN 27 58* 40* 56*   CR 5.32* 8.77* 7.32* 8.70*      I/O last 3 completed shifts:  In: 1648.26 [I.V.:419.26; Other:229; IV Piggyback:1000]  Out: 0    Vitals:    01/11/19 1545 01/11/19 1659 01/11/19 1750   Weight: 61.8 kg (136 lb 3.9 oz) 61.8 kg (136 lb 3.9 oz) 62 kg (136 lb 11 oz)        Acid Base / Electrolytes  Recent Labs   Lab Test 01/11/19  1835 01/11/19  1256 01/11/19  1254 01/02/19  0753  10/06/17  1658  06/22/14  3697   PH 7.45  --   --   --   --   --   --  7.49*   PCO2 38  --   --   --   --   --   --  42   PHV  --  7.45*  --   --   --  7.50*  --   --    PCO2V  --   42  --   --   --  46  --   --    CO2  --   --  28 26   < >  --    < >  --    ANIONGAP  --   --  9 12   < >  --    < >  --    LACT 2.3* 1.2  --   --   --   --    < >  --     < > = values in this interval not displayed.      Recent Labs   Lab Test 19  1254 19  0753  18  0800  12/21/15  0933  14  0326 14  0356    134   < > 133   < > 138   < > 142 142   POTASSIUM 4.4 4.9   < > 5.8*   < > 4.0   < > 4.1 3.6   MAG  --   --   --   --   --   --   --  2.0 1.8   SHIRA 8.0* 8.6   < > 7.9*   < > 6.8*   < > 8.2* 8.3*   PHOS  --   --   --  3.4  --  3.1   < > 4.8* 2.7    < > = values in this interval not displayed.          ===INFECTIOUS DISEASE===       # Suspected PNA   # Acute hypoxic respiratory failure  Afebrile. No leukocytosis. Procacitonin 0.94. CXR suggestive of pulmonary edema, infection, ARDS, or other diffuse alveolar damage. Started on zosyn in the ED with addition of vancomycin. Lactate negative.  - Continue vanc, zosyn  - trend CBC  - MRSA nares pending  - Repeat lactate    Temp (24hrs), Av.1  F (36.7  C), Min:97.6  F (36.4  C), Max:98.6  F (37  C)     Recent Labs   Lab Test 19  1254 19  0736 18  0902  18  1439   WBC 9.0 6.4 6.5   < > 6.6   ANEU 6.6  --   --   --  4.9   ALYM 1.1  --   --   --  0.6*   AEOS 0.5  --   --   --  0.2    < > = values in this interval not displayed.      Recent Labs   Lab Test 19  1803 19  1627 19  1301 19  1254  12/18/15  1655   CULT  --  PENDING No growth after 4 hours No growth after 4 hours  --  No growth   SDES Nares Sputum Endotracheal  Sputum Endotracheal Blood VAD Collection Blood VAD Collection   < > Blood Left Hand    < > = values in this interval not displayed.          ===HEMATOLOGY===  # Anemia  Likely anemia of chronic kidney disease. Hb stable from prior on discharge earlier in the month.  - trend Hb    Recent Labs   Lab Test 19  1817 19  1254 19  0736 18  1152   12/30/18  0902   HGB  --  7.9* 8.2* 9.0*   < > 7.6*   MCV  --  93 89  --   --  89    277 189  --   --  179   INR  --  1.23*  --   --   --  1.24*    < > = values in this interval not displayed.          ===ENDOCRINE===  # DM II  Hold home insulin while unable to take PO intake. Will likely initiate sliding scale with start of tube feeds in AM.    Recent Labs   Lab 01/11/19  1254   *          ===SKIN/RHEUM/MSK===  # Gout  - Hold PTA allopurinol for now    GI PPx: Pantoprazole  DVT PPx: subcutaneous heparin  CODE: Full    Patient seen and discussed with staff attending, Dr. Salinas.      Vanessa Fregoso  PGY-1, Internal Medicine  P: [id number]            MICU HPI  Angle Munguia ([id number]) admitted on 1/11/2019  Primary care provider: [facility] Garcia           Reason for Transfer to MICU:     Acute hypoxic respiratory failure requiring intubation         History of Present Illness     Angle Munguia is a 83 year old male with a PMHx of ESRD on HD, hypertension, DM II, gout, and GERD presenting from dialysis with acute hypoxia with O2 sats in the 80s subsequently intubated in the ED. Patient was at outpatient dialysis this afternoon where he became hypoxic and was transferred to the ED. Reportedly treated with nebs en route without improvement. Workup in the ED included BMP largely within normal limits, CBC without leukocytosis, negative troponin. Treated with one dose of zosyn prior to admission to the MICU for further management.    Patient recently discharged from [facility] following left fistula thrombectomy. Following discharge, family reports onset of a cough. It is unclear if the cough is dry or productive. No known sick contacts.              Past Medical History     Past Medical History:   Diagnosis Date     Anemia      Blind left eye      Chronic kidney disease      Dermatochalasis      Dialysis patient (H)      Dry eye syndrome      Gastric ulcer      Glaucoma (increased eye pressure)      Gout       Hyperparathyroidism (H)      Hypertension      Nonsenile cataract      Retinal detachment     LE, now NLP      Seizures (H)      Subdural hematoma (H)      Swelling of left upper extremity      Tachycardia      Type 2 diabetes mellitus without complications (H)          Past Surgical History     Past Surgical History:   Procedure Laterality Date     AV FISTULA OR GRAFT ARTERIAL      right arm     C PLACE CATH AV DIALYSIS SHUNT       CATARACT IOL, RT/LT Bilateral 2011     ENDOVASCULAR PLACEMENT VASCULAR DEVICE Left 11/21/2018    Procedure: LEFT UPPER EXTREMITY HERO GRAFT WITH INSTANT STICK, VENOGRAM, EXCISION OF AV FISTULA ANEURYSM;  Surgeon: Salazar Domingo MD;  Location:  OR     ESOPHAGOSCOPY, GASTROSCOPY, DUODENOSCOPY (EGD), COMBINED  1/30/2014    Procedure: COMBINED ESOPHAGOSCOPY, GASTROSCOPY, DUODENOSCOPY (EGD), BIOPSY SINGLE OR MULTIPLE;;  Surgeon: Ashlyn Friedman MD;  Location:  GI     ESOPHAGOSCOPY, GASTROSCOPY, DUODENOSCOPY (EGD), COMBINED  3/27/2014    Procedure: COMBINED ESOPHAGOSCOPY, GASTROSCOPY, DUODENOSCOPY (EGD), BIOPSY SINGLE OR MULTIPLE;;  Surgeon: Ashlyn Friedman MD;  Location:  GI     IR CVC TUNNEL PLACEMENT > 5 YRS OF AGE  12/28/2018     THROMBECTOMY UPPER EXTREMITY Left 12/30/2018    Procedure: LEFT ARM THROMBECTOMY, HERO GRAFT, EVACUATE HEMATOMA, HEROGRAM;  Surgeon: Salazar Domingo MD;  Location:  OR     UPPER GI ENDOSCOPY  3/27/14          Medications     No current outpatient medications on file.          Allergies     No Known Allergies       Social History     Social History     Socioeconomic History     Marital status:      Spouse name: Not on file     Number of children: Not on file     Years of education: Not on file     Highest education level: Not on file   Social Needs     Financial resource strain: Not on file     Food insecurity - worry: Not on file     Food insecurity - inability: Not on file     Transportation needs -  medical: Not on file     Transportation needs - non-medical: Not on file   Occupational History     Not on file   Tobacco Use     Smoking status: Never Smoker     Smokeless tobacco: Never Used   Substance and Sexual Activity     Alcohol use: No     Drug use: No     Sexual activity: Not on file   Other Topics Concern     Parent/sibling w/ CABG, MI or angioplasty before 65F 55M? Not Asked   Social History Narrative     Not on file          Family History     Family History   Problem Relation Age of Onset     Glaucoma No family hx of      Macular Degeneration No family hx of           Review of Systems     10 Point ROS not obtained as patient sedated and unable to provide history.          OBJECTIVE     Temp:  [97.6  F (36.4  C)-98.6  F (37  C)] 97.6  F (36.4  C)  Pulse:  [] 80  Heart Rate:  [] 80  Resp:  [11-43] 33  BP: ()/() 122/75  Cuff Mean (mmHg):  [61-86] 67  FiO2 (%):  [45 %] 45 %  SpO2:  [94 %-100 %] 96 %    Ventilation Mode: CMV/AC  (Continuous Mandatory Ventilation/ Assist Control)  FiO2 (%): 45 %  Rate Set (breaths/minute): 16 breaths/min  Tidal Volume Set (mL): 450 mL  PEEP (cm H2O): 7 cmH2O  Oxygen Concentration (%): 35 %  Resp: (!) 33      Physical Exam  GEN: elderly male, sedated, intubated      NEURO: moves all four extremities. Prior optho surgery. Pupils not equal.  HEENT: NCAT, no scleral icteris  LUNG: coarse breath sounds bilaterally from the anterior  CV: RRR, no appreciable murmur  ABD: Soft, NT, ND, BS +  EXT: Extremities are warm, dry, and well perfused bilaterally. Left upper extremity with hyperpigmented skin overlying catheter. Trace to mild bilateral pedal edema.   SKIN: No rash. Lines in place in bilateral supraclavicular fossa  Peripheral IV 01/11/19 Right Upper forearm (Active)   Site Assessment WDL 1/11/2019  8:00 PM   Line Status Infusing 1/11/2019  8:00 PM   Phlebitis Scale 0-->no symptoms 1/11/2019  8:00 PM   Infiltration Scale 0 1/11/2019  8:00 PM    Extravasation? No 1/11/2019  8:00 PM   Number of days: 1       CVC Double Lumen 12/28/18 Right Internal jugular (Active)   Site Assessment Other (Comment) 1/12/2019 12:00 AM   Lumen Soln/Vol REFERENCE 1.6/1.6 1/11/2019  5:30 PM   Dressing Intervention Chlorhexidine sponge 1/11/2019  5:30 PM   Dressing Change Due 01/18/19 1/12/2019 12:00 AM   CVC Comment CDI 1/12/2019 12:00 AM   Lumen A - Color BLUE 1/11/2019  5:30 PM   Lumen A - Cap Change Due 01/18/19 1/11/2019  5:30 PM   Lumen B - Color RED 1/11/2019  5:30 PM   Lumen B - Cap Change Due 01/18/19 1/11/2019  5:30 PM   Number of days: 15       CVC Triple Lumen 01/11/19 Right Internal jugular (Active)   Site Assessment WDL 1/11/2019  8:00 PM   Dressing Intervention New dressing 1/11/2019  8:00 PM   Dressing Change Due 01/18/19 1/11/2019  8:00 PM   CVC Comment CDI 1/11/2019  8:00 PM   Lumen A - Status saline locked 1/11/2019  8:00 PM   Lumen A - Cap Change Due 01/15/19 1/11/2019  8:00 PM   Lumen B - Status infusing 1/11/2019  8:00 PM   Lumen B - Cap Change Due 01/15/19 1/11/2019  8:00 PM   Lumen C - Status infusing 1/11/2019  8:00 PM   Lumen C - Cap Change Due 01/15/19 1/11/2019  8:00 PM   Number of days: 1       Airway - Adult/Peds 7.5 endotracheal tube (Active)   Site Appearance Clean and dry 1/12/2019 12:48 AM   Secured By Commercial tube najera 1/12/2019 12:48 AM   Secured at (cm) to lip 25 cm 1/12/2019 12:48 AM   Cuff Pressure - Type minimal leak technique 1/12/2019 12:48 AM   Tube Care/Reposition repositioned tube right side of mouth 1/12/2019 12:48 AM   Bite Block Secure and Patent 1/12/2019 12:48 AM   Safety Measures manual resuscitator/PEEP valve in room 1/12/2019 12:48 AM   Number of days: 1       NG/OG Tube Orogastric 14 fr Center mouth (Active)   Site Description WDL 1/12/2019 12:00 AM   Status Clamped 1/12/2019 12:00 AM   Placement Check Trezevant unchanged 1/12/2019 12:00 AM   Trezevant (cm marking) at nare/mouth 54 cm 1/12/2019 12:00 AM   Number of days:  1       Hemodialysis Vascular Access Left Arm (Active)   Number of days: 2080       Incision/Surgical Site 12/30/18 Left Arm (Active)   Number of days: 13     I/O last 3 completed shifts:  In: 1648.26 [I.V.:419.26; Other:229; IV Piggyback:1000]  Out: 0     Vitals:    01/11/19 1545 01/11/19 1659 01/11/19 1750   Weight: 61.8 kg (136 lb 3.9 oz) 61.8 kg (136 lb 3.9 oz) 62 kg (136 lb 11 oz)       ABG / VBG:   Recent Labs   Lab Test 01/11/19  1835 01/11/19  1256 10/06/17  1658 06/22/14  2347 06/22/14 2011   PH 7.45  --   --  7.49* 7.46*   PCO2 38  --   --  42 48*   PO2 147*  --   --  89 127*   O2PER 45.0  --   --  3L 4L   PHV  --  7.45* 7.50*  --   --    PCO2V  --  42 46  --   --        BMP:   Recent Labs   Lab Test 01/11/19  1835 01/11/19  1256 01/11/19  1254 01/02/19  0753 01/01/19  0736 12/31/18  0800  12/21/15  0933  12/19/15  2109  06/23/14  0326 06/22/14  0356   NA  --   --  135 134 133 133   < > 138   < >  --    < > 142 142   POTASSIUM  --   --  4.4 4.9 5.6* 5.8*   < > 4.0   < >  --    < > 4.1 3.6   CHLORIDE  --   --  98 96 97 98   < > 100   < >  --    < > 98 97   CO2  --   --  28 26 29 25   < > 29   < >  --    < > 30 39*   ANIONGAP  --   --  9 12 7 10   < > 8   < >  --    < > 13 6   LACT 2.3* 1.2  --   --   --   --   --   --   --  2.3*   < >  --   --    BUN  --   --  27 58* 40* 56*   < > 28   < >  --    < > 30 19   CR  --   --  5.32* 8.77* 7.32* 8.70*   < > 8.12*   < >  --    < > 5.50* 4.19*   GLC  --   --  146* 105* 141* 106*   < > 115*   < >  --    < > 94 100*   SHIRA  --   --  8.0* 8.6 8.3* 7.9*   < > 6.8*   < >  --    < > 8.2* 8.3*   MAG  --   --   --   --   --   --   --   --   --   --   --  2.0 1.8   PHOS  --   --   --   --   --  3.4  --  3.1  --   --    < > 4.8* 2.7    < > = values in this interval not displayed.       Hepatic Studies:   Recent Labs   Lab Test 01/11/19  1254 12/31/18  0800 12/26/18  1521 11/14/17  1259   AST 29  --  8 14   ALT 13  --  10 18   ALKPHOS 87  --  51 82   BILITOTAL 0.6  --  0.7 0.8    ALBUMIN 3.1* 2.5* 3.6 4.2       Heme Studies:   Recent Labs   Lab Test 01/11/19  1817 01/11/19  1254 01/01/19  0736 12/31/18  1152  12/30/18  0902  12/28/18  1441  12/27/18  1439   WBC  --  9.0 6.4  --   --  6.5   < >  --    < > 6.6   ANEU  --  6.6  --   --   --   --   --   --   --  4.9   ALYM  --  1.1  --   --   --   --   --   --   --  0.6*   AEOS  --  0.5  --   --   --   --   --   --   --  0.2   HGB  --  7.9* 8.2* 9.0*   < > 7.6*   < > 7.8*   < > 8.0*   MCV  --  93 89  --   --  89   < >  --    < > 88    277 189  --   --  179   < > 135*   < > 152   INR  --  1.23*  --   --   --  1.24*  --  1.25*  --   --     < > = values in this interval not displayed.       Urine Studies:   Recent Labs   Lab Test 06/21/14  1815 06/19/14  0345   SG 1.009 1.006   URINEPH 8.5* 8.0*   NITRITE Negative Negative   LEUKEST Moderate* Small*   WBCU 18* 4*   RBCU 92* 2   PROTEIN 300* 100*       Imaging: reviewed in Epic, pertinent findings mentioned below

## 2019-01-12 NOTE — PROGRESS NOTES
CRRT STATUS NOTE    DATA:  Time:  5:48 PM  Pressures WNL:  Yes  Filter Status:  WDL    Problems Reported/Alarms Noted:  None    Supplies Present:  Yes    ASSESSMENT:  Patient Net Fluid Balance:  +2.8L   Vital Signs:  97.9, 101, RR 22, 103/44, 100%  Labs:  K 5.4, Mg 1.9, Phos 4.9, hgb 7.6, Plt 286  Goals of Therapy:  I=O    INTERVENTIONS:   Initiated at 1145.      PLAN:  Continue to pull I=O as able.  Contact CRRT RN with questions/concerns.

## 2019-01-12 NOTE — PROGRESS NOTES
Critical Care Services Progress Note:  I personally examined and evaluated the patient today. I formulated today s plan with the house staff team or resident(s) and agree with the findings and plan in the associated note (see separately attested resident note).   I have evaluated all laboratory values and imaging studies from the past 24 hours.  Summary of hospital course:    83 year old male with ESRD presents to the ICU with hypoxic respiratory failure, now intubated.      Data  Labs (personally reviewed):    CXR with bilateral infiltrates, right more than left.    Copious thin, pink secretions in ET tube.   Bilateral wet crackles.     Assessment/plan:    Acute hypoxic Respiratory failure: Suspect pneumonia vs acute pulmonary edema. May have mitral regurg vs systolic/diastolic HF.  Will get ET tube cultures, start Vanc/zosyn, get ECHO and dialysis.  I increased his tidal volume to 450 and PEEP to 7.  Appears more comfortable on the vent. May need a bronchoscopy - didn't perform now since he is currently getting dialysis.     ESRD: nephrology consulted    Rest per resident note.   I spent a total of 40  minutes (excluding procedure time) personally providing and directing critical care services at the bedside and on the critical care unit for this patient.     George Salinas MD

## 2019-01-13 NOTE — PLAN OF CARE
Remains intubated and sedated on propofol and fentanyl. Opens eyes with cares, followed commands when family at bedside. Afebrile. 12 lead EKG this AM showing accelerated junctional rhythm. 5mg IV metoprolol ordered, after 2mg given MAPs dropped to 40s. MICU MD notified and at bedside, remaining dose discarded. Repeat EKG showing atrial fib. Pt now appears to be back in accelerated junctional rhythm HR 80-110s. Goal MAP > 65, dopamine weaned off and able to titrate levo down, vaso remains at 2.4. 40% FiO2, PEEP decreased to 5. TF infusing at goal of 35mls/hr, no BM this shift. On CRRT, goal net negative 25-50cc/hr, pt tolerating well. No UO, bladder scan for 127mls. Hgb at 1000 6.8, MICU notified, no blood tx'd and recheck 7.2. Vascular surg consulted for increased edema on L arm with AVF. Family updated on plan of care. Plan to continue to pull fluid on CRRT, wean pressors as able. Consider PS trial in next day or two.    Restraint for Non-Violent/Non-Self-Destructive Behavior  Prevent/Manage Potential Problems  Description  Maintain safety of patient and others during period of restraint.  Promote psychological and physical wellbeing.  Prevent injury to skin and involved body parts.  Promote nutrition, hydration, and elimination.  1/13/2019 1724 - No Change by Ken Bender, ERICA   Pt remains in bilateral wrist restraints for safety due to attempting to pull at lines and ETT tube.

## 2019-01-13 NOTE — PROGRESS NOTES
Nephrology Progress Note  01/13/2019         Assessment & Recommendations:   Angle Munguia is a 83 year old male with PMH of ESRD, HTN, DM2, GERD who was seen hypoxic in dialysis today and was sent to ED for respiratory failure intubated in ED now on vents and treated for possible pneumonia.      ESRD: on HD due to DM,HTN, dialysis at [facility], under care of Dr John hodges, MWF, tunnel cathter 3hr 15 min, EDW 62.5kg.  ----  Overall, will plan to continue CRRT with changes including increased dialysate flow and attempt for UF.      Access: Right internal jugular tunnel cathter being used now, left AVG hero graft recently revised.      Acute respiratory failure: intubated due to probable bacterial pneumonia. S/P Bronch, cultures pending.     BP and volume:   Septic Shock -on pressor support, but pressor req coming down. He is becoming more volume overloaded on exam, will shoot for some UF today.     Electrolyte and acid base: Hyperkalemia - should improve with increased dialysate flow      Anemia: Hgb stable at ~7 without obvious clinical bleeding. Will restart EPO once transitioned to HD.       PLAN  1. Continue CRRT, goal of 0-50cc/hr of UF as BP allows      Seen and discussed with Dr. Padilla.    Chema Esteves MD   [phone]    Interval History :   Nursing and provider notes from last 24 hours reviewed.  Mr. Munguia remains on vasopressor support for BP and mech ventilation for resp failure. He has had some improvement as he is off EPI and Dopamine. S/P Bronch yesterday. No fevers since midnight.    Review of Systems:   Unable to obtain due to intubated/sedated status.     Physical Exam:   I/O last 3 completed shifts:  In: 2881.18 [I.V.:2384.18; Other:7; NG/GT:290]  Out: 2348 [Other:2348]   /69 (BP Location: Right arm)   Pulse 126   Temp 98.1  F (36.7  C) (Axillary)   Resp 16   Wt 65.7 kg (144 lb 13.5 oz)   SpO2 100%   BMI 27.37 kg/m         GENERAL APPEARANCE: intubated and sedated  HEENT:  ET Tube in place  Pulmonary: mech vent, crackles bilaterally, air entry bilaterally  CV: RRR   - Edema 1+  GI: soft, nontender, normal bowel sounds  SKIN: no rash, warm, dry, no cyanosis  NEURO: intubated and sedated  Access: right Tunneled catheter, left hero AVG with suture over the site no bleeding, good thrill      Labs:   All labs reviewed by me  Electrolytes/Renal -   Recent Labs   Lab Test 01/13/19  0414 01/12/19  2131 01/12/19  1549    134 134   POTASSIUM 5.7* 5.6* 5.4*   CHLORIDE 102 101 103   CO2 21 23 20   BUN 26 30 34*   CR 3.43* 3.91* 4.55*   * 134* 160*   SHIRA 7.7* 7.8* 8.3*   MAG 2.5* 2.5* 1.9   PHOS 4.4 4.4 4.9*       CBC -   Recent Labs   Lab Test 01/13/19  0414 01/12/19 2131 01/12/19  1549   WBC 11.1* 13.0* 15.2*   HGB 7.0* 7.3* 7.6*    238 286       LFTs -   Recent Labs   Lab Test 01/13/19  0414 01/12/19  2131 01/12/19  1549   ALKPHOS 100 102 108   BILITOTAL 1.2 1.1 1.0   * 818* 834*   AST 1,780* 2,575* 3,361*   PROTTOTAL 6.3* 6.4* 6.4*   ALBUMIN 2.5* 2.6* 2.6*       Iron Panel -   Recent Labs   Lab Test 12/28/18  0805 12/21/15  1722 06/20/14  0404   IRON 19* 37 13*   IRONSAT 10* 24 7*   NATALY 607* 491* 1,374*     Imaging:  All imaging studies reviewed by me.     Current Medications:    B and C vitamin Complex with folic acid  5 mL Per Feeding Tube Daily     heparin  5,000 Units Subcutaneous Q8H     hydrocortisone sodium succinate PF  50 mg Intravenous Q6H     influenza Vac Split High-Dose  0.5 mL Intramuscular Prior to discharge     insulin aspart  1-3 Units Subcutaneous Q4H     ipratropium  0.5 mg Nebulization Q4H While awake     levETIRAcetam  500 mg Oral or Feeding Tube BID     levofloxacin  750 mg Intravenous Q24H     pantoprazole  40 mg Oral or Feeding Tube QAM AC     piperacillin-tazobactam  4.5 g Intravenous Q6H     protein modular  1 packet Per Feeding Tube BID     vancomycin (VANCOCIN) IV  1,250 mg Intravenous Q24H       IV fluid REPLACEMENT ONLY       CRRT  replacement solution 20 mL/kg/hr (01/13/19 0915)     DOPamine Stopped (01/13/19 0830)     EPINEPHrine IV infusion ADULT Stopped (01/12/19 0915)     fentaNYL 50 mcg/hr (01/13/19 0934)     - MEDICATION INSTRUCTIONS -       norepinephrine 0.26 mcg/kg/min (01/13/19 0900)     CRRT replacement solution 3.236 mL/kg/hr (01/12/19 1038)     CRRT replacement solution 12.5 mL/kg/hr (01/13/19 0445)     propofol (DIPRIVAN) infusion 35 mcg/kg/min (01/13/19 0900)     sodium chloride 5 mL/hr at 01/13/19 0100     vasopressin (PITRESSIN) infusion ADULT (40 mL) 2.4 Units/hr (01/13/19 0900)     Chema Esteves MD

## 2019-01-13 NOTE — PROGRESS NOTES
CRRT STATUS NOTE    DATA:  Time:  3:37 PM  Pressures WNL:  YES  Filter Status:  WDL    Problems Reported/Alarms Noted:  none    Supplies Present:  YES    ASSESSMENT:  Patient Net Fluid Balance:  Net negative 180cc since midnight  Vital Signs:  /69 (BP Location: Right arm)   Pulse 126   Temp 97.3  F (36.3  C) (Axillary)   Resp 16   Wt 65.7 kg (144 lb 13.5 oz)   SpO2 98%   BMI 27.37 kg/m    Levophed 0.16mcg/kg/min, vaso 2.4 unit(s)/hr, dopamine off  Labs: K 5.0 (down from 5.7), Mg 2.4, Phos 4.5, Cr 3.09, BUN 24, Hgb 6.8  Goals of Therapy:  Net negative 25-50cc/hr as BP allows    INTERVENTIONS:   Dialysate flow rate increased to 1200cc/hr. Patient tolerating decrease in vasopressors throughout day. Some cardiac instability with administration of metoprolol - since resolved. Fluid removal paused during that time, but now resumed and patient tolerating well. Plan of care discussed with bedside RN and renal team.     PLAN:  Continue to treat as ordered. Notify check circuit every shift and change as needed, at least every 72 hours. Next scheduled change 1/14/19 at 6706. Please call the CRRT RN at [phone] with any questions or concerns.

## 2019-01-13 NOTE — PROGRESS NOTES
CRRT STATUS NOTE    DATA:  Time:  6:33 AM  Pressures WNL:  YES  Filter Status:  WDL    Problems Reported/Alarms Noted:  None    Supplies Present:  YES    ASSESSMENT:  Patient Net Fluid Balance: Net -63 ml @0600  Vital Signs: , RR 16, /43, MAP 68  Labs: K 5.7, Mg 2.5, Phos 4.4, iCa 4.2, Lact 1.1, Hgb 7.0, Plt 218  Goals of Therapy:  I = O    INTERVENTIONS:   None    PLAN:  Continue to monitor circuit daily and change set q72 hours or PRN for clotting/clogging. Please call CRRT RN with any questions/problems.

## 2019-01-13 NOTE — PLAN OF CARE
D: 82 y/o male, intubated, crrt, 3 pressors  I/A: Patient arouses with cares, able to follow commands when spoke in hmong. Purposeful movement toward ETT, remains in bilateral wrist restraints. Sinus tachycardia with rates 100-120's. Afebrile. MAP >65, dopamine down to 3, still on levo and vaso. Minimal ett secretions. Left arm swelling, MD OK'd art line on fistula arm. Tube feeds at 20. K rising, AM was 5.7, renal aware, will not change crrt bath until K >6. CRRT I=O goal met since 0000.  P: Continue to wean pressors. Follow plan of care and report to MICU with changes in status.

## 2019-01-13 NOTE — PROGRESS NOTES
MICU Progress Note  Angle Munguia MRN: [id number]  1935  Date of Admission:1/11/2019  Primary care provider: La Nena Garcia      ASSESSMENT & PLAN :    Angle Munguia is a 83 year old male with a PMHx of ESRD, HTN, DM II, gout admitted with acute hypoxic respiratory failure requiring intubation with onset during dialysis on 1/11. He is currently on three pressors and being treated for a bacterial community acquired pneumonia.    CHANGES TODAY:  - Continue vanc, zosyn, levofloxacin  - Start hydrocortisone 50 mg q 6 hours  - Increase propofol    ===NEURO===  # Sedation/Analgesia  Propofol and fentanyl    # History of seizures  Previously on levetiracetam 500 qday per chart review.  - Continue PTA levetiracetam    ===CARDIOVASCULAR===    # Shock  Suspected 2/2 sepsis from pneumonia, hypovolemia. Given 500 ml LR bolus x 2 with improvements in MAPs from upper 50s to 70s. Troponin negative in ED. Previously required three pressors. Cards did a bedside echo overnight on 1/12 which was normal by verbal report.  - Add norepi and attempt to titrate off dopamine and epinephrine  - Target MAPs of 65  - Hold PTA antihypertensives  - Formal TTE    # TTE in 2014  - Normal EF (55-60%) without valvular dysfunction    #. Bradycardia, resolved  Had episodes of junctional rhythm overnight, rates in 40s. Pt likely unable to tolerate lower HR given sepsis. May have been due to propofol/dexmedetomidine. Currently rates 110s- more appropriate given sepsis  - Hold PTA metoprolol      ===RESPIRATORY===    # Acute hypoxic respiratory failure  #. Bacterial pneumonia  #. Concern for developing ARDS  Patient reportedly hypoxic at outpatient dialysis preceding presentation with intubation in ED. 2/2 CAP. No risk factors for resistant organisms. BCs NGTD, sputum NGTD. Repeat CXR shows continued infiltrates in right lung and some possible worsening in left, raising concern for developing ARDS, although is not requiring high ventilatory  support, which would argue against ARDS.  - Continue mechanical ventilation  - CMV, RR 16, , PEEP 7, FiO2 45%  - Trend ABGs  - Follow up sputum culture  - Follow up legionella and strep pneumo antigens  - Follow up RVP  - Repeat blood cultures  - Ipratropium to PRN   - Continue vanc, levofloxacin, pip-satinder      Ventilation Mode: CMV/AC  (Continuous Mandatory Ventilation/ Assist Control)  FiO2 (%): 45 %  Rate Set (breaths/minute): 16 breaths/min  Tidal Volume Set (mL): 550 mL  PEEP (cm H2O): 7 cmH2O  Oxygen Concentration (%): (S) 40 %  Resp: 26      SpO2: 100 %   O2 Device: Mechanical Ventilator           ===GASTROINTESTINAL===    # GERD  On PTA omperazole  - Pantoprazole PO daily while on vent    # Nutrition  Plan to initiate tube feeds on 1/12.  - Nutrition consult placed      ===RENAL / ELECTROLYTES===    # ESRD on HD  Previously with malfunctioning dialysis fistula in December 2016 s/p thrombectomy 12/30. CVC RIJ tunneled catheter placed 12/28. Per nephrology, estimated dry weight of 62.5 kg. Weight on admission 61.8 kg. Unclear if this represents error in weight or if patient truly volume down. Does not appear significantly volume overloaded on exam. Partial dialysis done at [facility] 1/11  - Nephrology following with recommendations as below  - CRRT with I/O goal per renal recommendations      ===INFECTIOUS DISEASE===       # Suspected PNA   # Acute hypoxic respiratory failure  Now febrile, WBC 17.5, procal 6.73. CXR suggestive of pulmonary edema, infection, ARDS, or other diffuse alveolar damage. Started on pip-satinder in the ED with addition of vancomycin. Lactate 5.2, now downtrending. Was changed to lindsay and azithro overnight  - Continue vanc, pip-satinder, levofloxacin  - trend CBC, lactate  - Rest as above under pulm        ===HEMATOLOGY===  # Anemia  Likely anemia of chronic kidney disease. Hb stable from prior on discharge earlier in the month. Ferritin high (acute reactant protein), iron normal, TIBC low.  -  trend Hb      ===ENDOCRINE===  # DM II  BGs variable  - Start low dose SSI      ===SKIN/RHEUM/MSK===  # Gout  - Hold PTA allopurinol for now    GI PPx: Pantoprazole  DVT PPx: subcutaneous heparin  CODE: Full    Patient seen and discussed with staff attending, Dr. Madrigal, who agrees with above assessment and plan.      Vanessa Fregoso  PGY-1, Internal Medicine  P: [phone]      Attending note:  Patient seen, examined and discussed with the Resident physicians. All data reviewed. Agree with assessment and plan as outlined in the above note.  The patient remains critically ill with ESRD, acute respiratory failure, septic shock and pneumonia.  Remains dependent on vasopressors- dosages decreasing, continued ventilatory support- not requiring high levels of support.  F/U cultures from bronchoscopy.     Total Critical Care time 40 minutes     Khloe Madrigal MD  [phone]          ______________________________________________________________________  PHYSICAL EXAM  Temp  Av.4  F (36.9  C)  Min: 97.6  F (36.4  C)  Max: 101.5  F (38.6  C)  Arterial Line BP  Min: 60/41  Max: 107/52  Arterial Line MAP (mmHg)  Av.4 mmHg  Min: 48 mmHg  Max: 73 mmHg      Pulse  Av.2  Min: 49  Max: 132 Resp  Av.7  Min: 11  Max: 43  FiO2 (%)  Av %  Min: 40 %  Max: 45 %  SpO2  Av.8 %  Min: 41 %  Max: 100 %         Intake/Output Summary (Last 24 hours) at 2019 0901  Last data filed at 2019 0800  Gross per 24 hour   Intake 4040.25 ml   Output 0 ml   Net 4040.25 ml     Wt Readings from Last 4 Encounters:   19 65.7 kg (144 lb 13.5 oz)   19 64.1 kg (141 lb 5 oz)   18 62.8 kg (138 lb 8 oz)   18 54.4 kg (120 lb)       Ventilation Mode: CMV/AC  (Continuous Mandatory Ventilation/ Assist Control)  FiO2 (%): 40 %  Rate Set (breaths/minute): 16 breaths/min  Tidal Volume Set (mL): 550 mL  PEEP (cm H2O): 7 cmH2O  Oxygen Concentration (%): 40 %  Resp: 16    Arterial Line BP: ()/() 128/52  MAP:   [52 mmHg-245 mmHg] 80 mmHg  BP - Mean:  [84-89] 89    GEN: elderly, ill. Sedated  EYES: PERRL, Anicteric sclera.   CV: tachcyardia, rhythm regular  PULM/CHEST: CTAB, symmetric chest rise. Ventilator.  GI: normal bowel sounds, non-tender, no rebound tenderness or guarding, no masses  : maxwell catheter in place  EXTREMITIES: No pedal edema, moving all extremities, peripheral pulses intact, sutures intact on left upper extremity. Edema of left upper extremity without significant erythema or appreciable fluctuance  NEURO: Sedated  SKIN: No rashes, sores or ulcerations      DIAGNOSTIC STUDIES  ROUTINE ICU LABS (Last four results)  CMP  Recent Labs   Lab 01/13/19 0414 01/12/19 2131 01/12/19  1549 01/12/19  0856    134 134 134   POTASSIUM 5.7* 5.6* 5.4* 5.8*   CHLORIDE 102 101 103 102   CO2 21 23 20 16*   ANIONGAP 11 10 10 17*   * 134* 160* 134*   BUN 26 30 34* 37*   CR 3.43* 3.91* 4.55* 5.24*   GFRESTIMATED 16* 13* 11* 9*   GFRESTBLACK 18* 16* 13* 11*   SHIRA 7.7* 7.8* 8.3* 7.4*   MAG 2.5* 2.5* 1.9 1.5*   PHOS 4.4 4.4 4.9* 5.3*   PROTTOTAL 6.3* 6.4* 6.4* 6.6*   ALBUMIN 2.5* 2.6* 2.6* 2.7*   BILITOTAL 1.2 1.1 1.0 1.2   ALKPHOS 100 102 108 119   AST 1,780* 2,575* 3,361* 2,858*   * 818* 834* 674*     CBC  Recent Labs   Lab 01/13/19 0414 01/12/19 2131 01/12/19  1549 01/12/19  0856   WBC 11.1* 13.0* 15.2* 18.8*   RBC 2.53* 2.61* 2.75* 2.75*   HGB 7.0* 7.3* 7.6* 7.6*   HCT 23.1* 23.9* 25.4* 25.5*   MCV 91 92 92 93   MCH 27.7 28.0 27.6 27.6   MCHC 30.3* 30.5* 29.9* 29.8*   RDW 15.8* 15.7* 15.9* 15.9*    238 286 299     INR  Recent Labs   Lab 01/11/19  1254   INR 1.23*     Arterial Blood Gas  Recent Labs   Lab 01/12/19  1012 01/12/19  0513 01/12/19  0150 01/11/19  1835   PH 7.38 7.34*  --  7.45   PCO2 35 36  --  38   PO2 128* 64*  --  147*   HCO3 21 19*  --  26   O2PER 45 40.0 40.0 45.0

## 2019-01-13 NOTE — PHARMACY-VANCOMYCIN DOSING SERVICE
Pharmacy Vancomycin Note  Date of Service 2019  Patient's  1935   83 year old, male    Indication: [facility] Pneumonia  Goal Trough Level: 15-20 mg/L  Day of Therapy: 2  Current Vancomycin regimen: intermittent    Current estimated CrCl = Estimated Creatinine Clearance: 11.2 mL/min (A) (based on SCr of 4.55 mg/dL (H)).- CRRT initiated today @ noon    Creatinine for last 3 days  2019: 12:54 PM Creatinine 5.32 mg/dL  2019:  1:50 AM Creatinine 5.04 mg/dL;  5:27 AM Creatinine 5.10 mg/dL;  8:56 AM Creatinine 5.24 mg/dL;  3:49 PM Creatinine 4.55 mg/dL    Recent Vancomycin Levels (past 3 days)  2019:  8:56 AM Vancomycin Level 21.4 mg/L    Vancomycin IV Administrations (past 72 hours)                   vancomycin (VANCOCIN) 1,500 mg in sodium chloride 0.9 % 250 mL intermittent infusion (mg) 1,500 mg Started 19                Nephrotoxins and other renal medications (From now, onward)    Start     Dose/Rate Route Frequency Ordered Stop    19 1815  vancomycin (VANCOCIN) 1,250 mg in sodium chloride 0.9 % 250 mL intermittent infusion      1,250 mg  over 90 Minutes Intravenous EVERY 24 HOURS 19 1813      19 1100  piperacillin-tazobactam (ZOSYN) 4.5 g vial to attach to  mL bag      4.5 g  over 1 Hours Intravenous EVERY 6 HOURS 19 0837      19 0130  vasopressin (VASOSTRICT) 40 Units in D5W 40 mL infusion      2.4 Units/hr  2.4 mL/hr  Intravenous CONTINUOUS 19 0120      19 2245  norepinephrine (LEVOPHED) 16 mg in D5W 250 mL infusion      0.03-0.4 mcg/kg/min × 61.8 kg (Dosing Weight)  1.7-23.2 mL/hr  Intravenous CONTINUOUS 19 2221               Contrast Orders - past 72 hours (72h ago, onward)    None          Interpretation of levels and current regimen:  Trough level is  Supratherapeutic and was drawn before CRRT was initated    Has serum creatinine changed > 50% in last 72 hours: No  Urine output:  anuric  Renal  Function: CRRT initiated this afternoon    Plan:  1.  With the start of CRRT schedule Vancomycin 1250 mg iv q24  2.  Pharmacy will check trough levels as appropriate in 1-3 Days.    3. Serum creatinine levels will be ordered daily for the first week of therapy and at least twice weekly for subsequent weeks.      Juany Dickson, ChiquitaD        .

## 2019-01-14 NOTE — PLAN OF CARE
D:I/A:   Pt with ESRD, hypertension, gout, DM 2; intubated 1/11 due to hypoxic respiratory status & started on CRRT the same day.   Transitioned to comfort cares at 1330 CRRT rinsed back shortly afterwards.  Extubated to 4 liters nasal cannula at 1355 and all drips stopped shortly afterwards.  Right PIVs and right CVC removed per family's request.    P:  Monitor on per family's request; pt is actively dying and family and friends are at BS

## 2019-01-14 NOTE — PROGRESS NOTES
Nephrology note     Pt was made comfort care this morning, will sign off      Patient seen and discussed  with Dr. Kike Lou  Nephrology Fellow  Pager: [phone]      I, Juan Manuel Stokes, saw this patient with Dr. Lou and agree with the findings and plan of care as documented in the note.

## 2019-01-14 NOTE — PLAN OF CARE
Restraint for Non-Violent/Non-Self-Destructive Behavior  Prevent/Manage Potential Problems  Description  Maintain safety of patient and others during period of restraint.  Promote psychological and physical wellbeing.  Prevent injury to skin and involved body parts.  Promote nutrition, hydration, and elimination.  1/14/2019 0932 - Completed by Nancy Cartagena, RN  1/14/2019 0643 by Nu Bhagat, RN  Note  Bilateral upper soft limb restraints d/c'ed and mitts placed on hands. Pt appears to be appropriate  will obtain new order for restraints if needed again. Continue to monitor.

## 2019-01-14 NOTE — PLAN OF CARE
OT 4C: Hold - OT consult received. Per chart review and discussion with RN, pt with continued discussion regarding goals of care and may transition to comfort cares. Will hold OT evaluation and initiate if indicated.

## 2019-01-14 NOTE — PLAN OF CARE
D: 84 y/o male, intubated, crrt, weaning pressors  I/A: Patient arouses with cares, able to follow commands when spoke in hmong. Purposeful movement toward ETT, remains in bilateral wrist restraints. Sinus tachycardia with inverted t waves with rates 90's-low 100's. Converted to sinus tachycardia from a junctional rhythm after coughing, once in sinus BP's improved and vaso turned off. Patients blood pressures also improved with increasing propofol. Currently weaning levo. Afebrile. MAP >65. Minimal ett secretions. Getting CaCl and blood for AM labs, see flow sheet. Had family conference last night with micu doctor, interpretor, wife, and childen. Family came to the conclusion that they wish to keep him pain free and comfortable, they made him a DNR and want to transition to comfort cares at 1300 today. They wish to stop crrt, extubate, and remove lines at the time of comfort cares if possible. May need to readdress comfort care decision now that off vaso.   P: Continue to wean pressors. Talk about comfort cares. Follow plan of care and report to MICU with changes in status.

## 2019-01-14 NOTE — PROGRESS NOTES
Vascular Surgery Consult Note    82 yo M with recent revision of LUE HeRO graft, admitted with hypotension and respiratory failure s/p intubation 2/2 pneumonia.    LUE with audible bruit in graft.  Edematous.    -pending goals of care discussions and if family wishes to continue, recommend venous duplex to rule out DVT and eval the patency of the graft    Full Consult H&P to follow

## 2019-01-14 NOTE — PROGRESS NOTES
Code status changed to DNR/DNI and patient transitioned to comfort care at the request of patient's wife and primary decision maker. Patient's children are present at bedside and the family is in agreement with the plan to discontinue life prolonging treatments including dialysis, pressors, and ventilation and transition to measures focused on patient comfort.    Vanessa Fregoso  PGY-1, Internal Medicine  P: [phone]

## 2019-01-14 NOTE — PROGRESS NOTES
Code status updated from Full Code to DNR following discussion with patient's family. Patient's spouse and children are present and are in agreement in transitioning to DNR as this would be consistent with patient's wishes and goals of care.    Vanessa Fregoso  PGY-1, Internal Medicine  P: [phone]

## 2019-01-14 NOTE — CONSULTS
[facility], [place]    Vascular Surgery Consultation    Date of Admission:  1/11/2019    Assessment & Plan   Angle Munguia is a 83 year old male who was admitted on 1/11/2019. I was asked to see the patient for swelling of LUE arm with Hero Graft, which recently underwent revision by Dr. Domingo on 12/30.  Patient was made DNR after discussions between the family and the ICU team.  Graft appears open, but swelling could be 2/2 DVT and/or resuscitation.  Recommend further imaging.    -pending clinical course and if family wishes to continue treatment, recommend venous duplex to rule out DVT and eval the patency of the graft  -overall care per primary  -following peripherally  -attending addendum to follow    Plan discussed with on call attending surgeon, Dr. Trujillo.      Active Problems:    Acute respiratory failure with hypoxia (H)      Akil Elliott MD    Chief Complaint   swelling of LUE arm with Hero Graft.    Unable to obtain a history from the patient due to critical condition    History of Present Illness   Angle Munguia is a 83 year old male with PMH of ESRD, HTN, DM and GERD who presents with swelling of LUE arm with Hero Graft.  Patient admitted with respiratory failure s/p intubation in setting of presumed pneumonia.  Of note, patient recently underwent hematoma evacuation and revision of his LUE HeRO graft by Dr. Dom[facility]  Patient has been dialyzing via a R internal jugular permcath since then.  Per report, patient's LUE swelling improved, but since admission it has worsened.  Currently on prophylactic heparin injections.  On two pressors, remains intubated.    Of note, family members at bedside requesting goals of care discussion with ICU team.    Past Medical History   I have reviewed this patient's medical history and updated it with pertinent information if needed.   Past Medical History:   Diagnosis Date     Anemia      Blind left eye       Chronic kidney disease      Dermatochalasis      Dialysis patient (H)      Dry eye syndrome      Gastric ulcer      Glaucoma (increased eye pressure)      Gout      Hyperparathyroidism (H)      Hypertension      Nonsenile cataract      Retinal detachment     LE, now NLP      Seizures (H)      Subdural hematoma (H)      Swelling of left upper extremity      Tachycardia      Type 2 diabetes mellitus without complications (H)        Past Surgical History   I have reviewed this patient's surgical history and updated it with pertinent information if needed.  Past Surgical History:   Procedure Laterality Date     AV FISTULA OR GRAFT ARTERIAL      right arm     C PLACE CATH AV DIALYSIS SHUNT       CATARACT IOL, RT/LT Bilateral 2011     ENDOVASCULAR PLACEMENT VASCULAR DEVICE Left 11/21/2018    Procedure: LEFT UPPER EXTREMITY HERO GRAFT WITH INSTANT STICK, VENOGRAM, EXCISION OF AV FISTULA ANEURYSM;  Surgeon: Salazar Domingo MD;  Location:  OR     ESOPHAGOSCOPY, GASTROSCOPY, DUODENOSCOPY (EGD), COMBINED  1/30/2014    Procedure: COMBINED ESOPHAGOSCOPY, GASTROSCOPY, DUODENOSCOPY (EGD), BIOPSY SINGLE OR MULTIPLE;;  Surgeon: Ashlyn Friedman MD;  Location: [facility] GI     ESOPHAGOSCOPY, GASTROSCOPY, DUODENOSCOPY (EGD), COMBINED  3/27/2014    Procedure: COMBINED ESOPHAGOSCOPY, GASTROSCOPY, DUODENOSCOPY (EGD), BIOPSY SINGLE OR MULTIPLE;;  Surgeon: Ashlyn Friedman MD;  Location: [facility] GI     IR CVC TUNNEL PLACEMENT > 5 YRS OF AGE  12/28/2018     THROMBECTOMY UPPER EXTREMITY Left 12/30/2018    Procedure: LEFT ARM THROMBECTOMY, HERO GRAFT, EVACUATE HEMATOMA, HEROGRAM;  Surgeon: Salazar Domingo MD;  Location:  OR     UPPER GI ENDOSCOPY  3/27/14       Prior to Admission Medications   Prior to Admission Medications   Prescriptions Last Dose Informant Patient Reported? Taking?   ALLOPURINOL PO  Son Yes No   Sig: Take 100 mg by mouth daily   AMLODIPINE BESYLATE PO  Son Yes No   Sig: Take 5 mg by mouth 2  times daily   B Complex-C-Folic Acid (DIALYVITE) TABS  Son Yes No   Sig: Take 1 tablet by mouth daily    Cholecalciferol (VITAMIN D3 PO)  Son Yes No   Sig: Take 400 Units by mouth daily    LISINOPRIL PO  Son Yes No   Sig: Take 20 mg by mouth daily   OMEPRAZOLE PO  Son Yes No   Sig: Take 20 mg by mouth every morning   Urea 40 % CREA  Son No No   Sig: Externally apply  topically 2 times daily. As directed.   acetaminophen (TYLENOL) 325 MG tablet   No No   Sig: Take 2 tablets (650 mg) by mouth every 6 hours as needed for mild pain   calcium acetate (PHOSLO) 667 MG CAPS  Son Yes No   Sig: Take 1,334 mg by mouth 3 times daily (with meals)    clopidogrel (PLAVIX) 75 MG tablet   No No   Sig: Take 1 tablet (75 mg) by mouth daily   insulin glargine (LANTUS SOLOSTAR PEN) 100 UNIT/ML pen   Yes No   Sig: Inject 5-10 Units Subcutaneous At Bedtime Son states patient takes about 10 units   levETIRAcetam (KEPPRA) 500 MG tablet  Son No No   Sig: Take 1 tablet (500 mg) by mouth daily   metoprolol succinate ER (TOPROL-XL) 25 MG 24 hr tablet   Yes No   Sig: Take 1 tablet (25 mg) by mouth 2 times daily   nitroglycerin (NITROSTAT) 0.4 MG sublingual tablet  Son Yes No   Sig: Place 0.4 mg under the tongue every 5 minutes as needed for chest pain For chest pain place 1 tablet under the tongue every 5 minutes for 3 doses. If symptoms persist 5 minutes after 1st dose call 911.      Facility-Administered Medications: None     Allergies   No Known Allergies    Social History   I have reviewed this patient's social history and updated it with pertinent information if needed. Angle Munguia  reports that  has never smoked. he has never used smokeless tobacco. He reports that he does not drink alcohol or use drugs.    Family History   I have reviewed this patient's family history and updated it with pertinent information if needed.   Family History   Problem Relation Age of Onset     Glaucoma No family hx of      Macular Degeneration No family hx  of        Review of Systems   The 10 point Review of Systems is negative other than noted in the HPI or here.     Physical Exam   Temp: 98  F (36.7  C) Temp src: Oral BP: 134/53 Pulse: 126 Heart Rate: 95 Resp: 17 SpO2: 99 % O2 Device: Mechanical Ventilator    Vital Signs with Ranges  Temp:  [97.3  F (36.3  C)-98.6  F (37  C)] 98  F (36.7  C)  Pulse:  [126] 126  Heart Rate:  [] 95  Resp:  [16-19] 17  BP: (133-134)/(53-69) 134/53  Cuff Mean (mmHg):  [76] 76  MAP:  [43 mmHg-80 mmHg] 76 mmHg  Arterial Line BP: ()/(33-54) 131/46  FiO2 (%):  [35 %-40 %] 35 %  SpO2:  [95 %-100 %] 99 %  144 lbs 13.48 oz    Constitutional:  NAD, sedated  HEENT: normocephalic  CV: RRR  Pulm: CTAB, nonlabored respirations, intubated  GI: soft, NT/ND  MSK: LUE edematous, sutures in place, audible bruit on exam with faint pulse  Neuro: unable to assess  Psych: unable to assess    Data   Results for orders placed or performed during the hospital encounter of 01/11/19 (from the past 24 hour(s))   Lactic acid whole blood   Result Value Ref Range    Lactic Acid 1.1 0.7 - 2.0 mmol/L   Comprehensive metabolic panel (AM Draw)   Result Value Ref Range    Sodium 134 133 - 144 mmol/L    Potassium 5.7 (H) 3.4 - 5.3 mmol/L    Chloride 102 94 - 109 mmol/L    Carbon Dioxide 21 20 - 32 mmol/L    Anion Gap 11 3 - 14 mmol/L    Glucose 146 (H) 70 - 99 mg/dL    Urea Nitrogen 26 7 - 30 mg/dL    Creatinine 3.43 (H) 0.66 - 1.25 mg/dL    GFR Estimate 16 (L) >60 mL/min/[1.73_m2]    GFR Estimate If Black 18 (L) >60 mL/min/[1.73_m2]    Calcium 7.7 (L) 8.5 - 10.1 mg/dL    Bilirubin Total 1.2 0.2 - 1.3 mg/dL    Albumin 2.5 (L) 3.4 - 5.0 g/dL    Protein Total 6.3 (L) 6.8 - 8.8 g/dL    Alkaline Phosphatase 100 40 - 150 U/L     (HH) 0 - 70 U/L    AST 1,780 (HH) 0 - 45 U/L   Cortisol   Result Value Ref Range    Cortisol Serum 10.1 4 - 22 ug/dL   Phosphorus   Result Value Ref Range    Phosphorus 4.4 2.5 - 4.5 mg/dL   Magnesium   Result Value Ref Range     Magnesium 2.5 (H) 1.6 - 2.3 mg/dL   CBC with platelets differential   Result Value Ref Range    WBC 11.1 (H) 4.0 - 11.0 10e9/L    RBC Count 2.53 (L) 4.4 - 5.9 10e12/L    Hemoglobin 7.0 (L) 13.3 - 17.7 g/dL    Hematocrit 23.1 (L) 40.0 - 53.0 %    MCV 91 78 - 100 fl    MCH 27.7 26.5 - 33.0 pg    MCHC 30.3 (L) 31.5 - 36.5 g/dL    RDW 15.8 (H) 10.0 - 15.0 %    Platelet Count 218 150 - 450 10e9/L    Diff Method Automated Method     % Neutrophils 85.0 %    % Lymphocytes 8.1 %    % Monocytes 2.8 %    % Eosinophils 3.6 %    % Basophils 0.3 %    % Immature Granulocytes 0.2 %    Nucleated RBCs 0 0 /100    Absolute Neutrophil 9.5 (H) 1.6 - 8.3 10e9/L    Absolute Lymphocytes 0.9 0.8 - 5.3 10e9/L    Absolute Monocytes 0.3 0.0 - 1.3 10e9/L    Absolute Eosinophils 0.4 0.0 - 0.7 10e9/L    Absolute Basophils 0.0 0.0 - 0.2 10e9/L    Abs Immature Granulocytes 0.0 0 - 0.4 10e9/L    Absolute Nucleated RBC 0.0     Anisocytosis Slight     Poikilocytosis Slight     Ovalocytes Slight     Platelet Estimate Confirming automated cell count    Calcium ionized whole blood   Result Value Ref Range    Calcium Ionized Whole Blood 4.2 (L) 4.4 - 5.2 mg/dL   Glucose by meter   Result Value Ref Range    Glucose 141 (H) 70 - 99 mg/dL   XR Chest Port 1 View    Narrative    Exam:  Chest X-ray 1/13/2019 6:19 AM    History: acute hypoxic respiratory failure s/p intubation    Comparison: 1/12/2019    Findings: AP semiupright chest x-ray. The endotracheal tube is  approximately 4.0 cm above the josemanuel. Gastric tube sidehole projects  near the gastroesophageal junction. Right IJ central venous catheter  with the tip projecting over the low SVC. Right subclavian vein  central venous catheter projects over the cavoatrial junction.  Left-sided HERO catheter project over the right atrium. Left  brachiocephalic stent. The trachea is midline. Stable cardiac  mediastinal silhouette. Small bilateral pleural effusions. Mixed  interstitial and airspace opacities are  increased throughout the right  lung and left base.      Impression    Impression:   1. Increased mixed interstitial and airspace opacities throughout the  right lung in left lower lung. Differential includes pulmonary edema,  infection, ARDS, and diffuse alveolar hemorrhage.  2. Slightly increased bilateral pleural effusions.  3. Gastric tube sidehole projects near the gastroesophageal junction.  Recommend repositioning. Otherwise stable supportive devices.    I have personally reviewed the examination and initial interpretation  and I agree with the findings.    CHERYL GIRON MD   Glucose by meter   Result Value Ref Range    Glucose 155 (H) 70 - 99 mg/dL   EKG 12-lead, tracing only   Result Value Ref Range    Interpretation ECG Click View Image link to view waveform and result    Comprehensive metabolic panel   Result Value Ref Range    Sodium 136 133 - 144 mmol/L    Potassium 5.0 3.4 - 5.3 mmol/L    Chloride 104 94 - 109 mmol/L    Carbon Dioxide 21 20 - 32 mmol/L    Anion Gap 10 3 - 14 mmol/L    Glucose 159 (H) 70 - 99 mg/dL    Urea Nitrogen 24 7 - 30 mg/dL    Creatinine 3.09 (H) 0.66 - 1.25 mg/dL    GFR Estimate 18 (L) >60 mL/min/[1.73_m2]    GFR Estimate If Black 21 (L) >60 mL/min/[1.73_m2]    Calcium 8.1 (L) 8.5 - 10.1 mg/dL    Bilirubin Total 1.0 0.2 - 1.3 mg/dL    Albumin 2.3 (L) 3.4 - 5.0 g/dL    Protein Total 5.8 (L) 6.8 - 8.8 g/dL    Alkaline Phosphatase 93 40 - 150 U/L     (HH) 0 - 70 U/L    AST 1,310 (HH) 0 - 45 U/L   Phosphorus   Result Value Ref Range    Phosphorus 4.5 2.5 - 4.5 mg/dL   Magnesium   Result Value Ref Range    Magnesium 2.4 (H) 1.6 - 2.3 mg/dL   CBC with platelets differential   Result Value Ref Range    WBC 9.7 4.0 - 11.0 10e9/L    RBC Count 2.42 (L) 4.4 - 5.9 10e12/L    Hemoglobin 6.8 (LL) 13.3 - 17.7 g/dL    Hematocrit 22.6 (L) 40.0 - 53.0 %    MCV 93 78 - 100 fl    MCH 28.1 26.5 - 33.0 pg    MCHC 30.1 (L) 31.5 - 36.5 g/dL    RDW 15.7 (H) 10.0 - 15.0 %    Platelet Count 199  150 - 450 10e9/L    Diff Method Automated Method     % Neutrophils 82.9 %    % Lymphocytes 10.0 %    % Monocytes 1.4 %    % Eosinophils 4.7 %    % Basophils 0.8 %    % Immature Granulocytes 0.2 %    Nucleated RBCs 0 0 /100    Absolute Neutrophil 8.0 1.6 - 8.3 10e9/L    Absolute Lymphocytes 1.0 0.8 - 5.3 10e9/L    Absolute Monocytes 0.1 0.0 - 1.3 10e9/L    Absolute Eosinophils 0.5 0.0 - 0.7 10e9/L    Absolute Basophils 0.1 0.0 - 0.2 10e9/L    Abs Immature Granulocytes 0.0 0 - 0.4 10e9/L    Absolute Nucleated RBC 0.0    Blood gas arterial   Result Value Ref Range    pH Arterial 7.36 7.35 - 7.45 pH    pCO2 Arterial 39 35 - 45 mm Hg    pO2 Arterial 127 (H) 80 - 105 mm Hg    Bicarbonate Arterial 22 21 - 28 mmol/L    Base Deficit Art 3.2 mmol/L    FIO2 40    Calcium ionized whole blood   Result Value Ref Range    Calcium Ionized Whole Blood 4.8 4.4 - 5.2 mg/dL   US Abd Cmpl Abd/Pelvis Duplex Cmpl    Narrative    EXAMINATION: US ABDOMEN COMPLETE WITH DOPPLER COMPLETE 1/13/2019 10:06  AM     COMPARISON: None available    HISTORY: LFTs and thousands. Likely shock. want to rule out other  causes such as portal vein thrombosis    TECHNIQUE: The abdomen was scanned in standard fashion with  specialized ultrasound transducer(s) using both gray-scale, color  Doppler, and spectral flow techniques.    Findings:  Liver: The liver demonstrates normal homogeneous echotexture. No  evidence of a focal hepatic mass.     Extrahepatic portal vein flow is antegrade at 14 cm/s.  Right portal vein flow is antegrade, measuring 12 cm/s.  Left portal vein flow is antegrade, measuring 12 cm/s.    Flow in the hepatic artery is towards the liver and:  131/20 cm/s peak systolic with low resistance monophasic waveform  0.85 resistive index.     The splenic vein is patent and flow is towards the liver.  The left,  middle, and right hepatic veins are patent with flow towards the IVC.  The IVC is patent with flow towards the heart.   The visualized  aorta  is not dilated.    Gallbladder: The bladder wall measures 0.4 cm. No pericholecystic  fluid, positive sonographic Laboy's sign or evidence for  cholelithiasis    Bile Ducts: Both the intra- and extrahepatic biliary system are of  normal caliber.  The common bile duct measures 4 mm.    Pancreas: Visualized portions of the head and body of the pancreas are  unremarkable.     Kidneys: The right kidney has increased echotexture. No mass or  hydronephrosis.. Right kidney measures 6.5 cm. Left kidney is not  visualized.    Spleen: The spleen measures 13.7 cm in length.    Fluid: Right-sided pleural effusion. Small amount of intra-abdominal  ascites.      Impression    Impression:   1. No evidence for portal vein thrombosis. Mildly elevated resistive  index in the liver which is nonspecific.  2. Moderate-sized right-sided pleural effusion.  3. Small amount of ascites.  4. Mild gallbladder wall thickening which is nonspecific in the  setting of ascites.  5. Atrophic right kidney. Left kidney is not visualized.    I have personally reviewed the examination and initial interpretation  and I agree with the findings.    CHERYL GIRON MD   EKG 12-lead, complete   Result Value Ref Range    Interpretation ECG Click View Image link to view waveform and result    Glucose by meter   Result Value Ref Range    Glucose 138 (H) 70 - 99 mg/dL   Comprehensive metabolic panel   Result Value Ref Range    Sodium 134 133 - 144 mmol/L    Potassium 5.4 (H) 3.4 - 5.3 mmol/L    Chloride 104 94 - 109 mmol/L    Carbon Dioxide 18 (L) 20 - 32 mmol/L    Anion Gap 11 3 - 14 mmol/L    Glucose 157 (H) 70 - 99 mg/dL    Urea Nitrogen 21 7 - 30 mg/dL    Creatinine 2.56 (H) 0.66 - 1.25 mg/dL    GFR Estimate 22 (L) >60 mL/min/[1.73_m2]    GFR Estimate If Black 26 (L) >60 mL/min/[1.73_m2]    Calcium 8.0 (L) 8.5 - 10.1 mg/dL    Bilirubin Total 1.3 0.2 - 1.3 mg/dL    Albumin 2.4 (L) 3.4 - 5.0 g/dL    Protein Total 6.2 (L) 6.8 - 8.8 g/dL    Alkaline  Phosphatase 106 40 - 150 U/L     (HH) 0 - 70 U/L    AST 1,118 (HH) 0 - 45 U/L   Phosphorus   Result Value Ref Range    Phosphorus 4.6 (H) 2.5 - 4.5 mg/dL   Magnesium   Result Value Ref Range    Magnesium 2.6 (H) 1.6 - 2.3 mg/dL   CBC with platelets differential   Result Value Ref Range    WBC 9.9 4.0 - 11.0 10e9/L    RBC Count 2.57 (L) 4.4 - 5.9 10e12/L    Hemoglobin 7.2 (L) 13.3 - 17.7 g/dL    Hematocrit 24.1 (L) 40.0 - 53.0 %    MCV 94 78 - 100 fl    MCH 28.0 26.5 - 33.0 pg    MCHC 29.9 (L) 31.5 - 36.5 g/dL    RDW 15.8 (H) 10.0 - 15.0 %    Platelet Count 198 150 - 450 10e9/L    Diff Method Automated Method     % Neutrophils 94.8 %    % Lymphocytes 2.4 %    % Monocytes 1.7 %    % Eosinophils 0.5 %    % Basophils 0.3 %    % Immature Granulocytes 0.3 %    Nucleated RBCs 0 0 /100    Absolute Neutrophil 9.4 (H) 1.6 - 8.3 10e9/L    Absolute Lymphocytes 0.2 (L) 0.8 - 5.3 10e9/L    Absolute Monocytes 0.2 0.0 - 1.3 10e9/L    Absolute Eosinophils 0.1 0.0 - 0.7 10e9/L    Absolute Basophils 0.0 0.0 - 0.2 10e9/L    Abs Immature Granulocytes 0.0 0 - 0.4 10e9/L    Absolute Nucleated RBC 0.0    Calcium ionized whole blood   Result Value Ref Range    Calcium Ionized Whole Blood 4.6 4.4 - 5.2 mg/dL   Glucose by meter   Result Value Ref Range    Glucose 147 (H) 70 - 99 mg/dL   Glucose by meter   Result Value Ref Range    Glucose 157 (H) 70 - 99 mg/dL   Comprehensive metabolic panel   Result Value Ref Range    Sodium 135 133 - 144 mmol/L    Potassium 5.3 3.4 - 5.3 mmol/L    Chloride 103 94 - 109 mmol/L    Carbon Dioxide 21 20 - 32 mmol/L    Anion Gap 12 3 - 14 mmol/L    Glucose 180 (H) 70 - 99 mg/dL    Urea Nitrogen 21 7 - 30 mg/dL    Creatinine 2.33 (H) 0.66 - 1.25 mg/dL    GFR Estimate 25 (L) >60 mL/min/[1.73_m2]    GFR Estimate If Black 29 (L) >60 mL/min/[1.73_m2]    Calcium 7.9 (L) 8.5 - 10.1 mg/dL    Bilirubin Total 1.3 0.2 - 1.3 mg/dL    Albumin 2.4 (L) 3.4 - 5.0 g/dL    Protein Total 6.3 (L) 6.8 - 8.8 g/dL    Alkaline  Phosphatase 108 40 - 150 U/L     (HH) 0 - 70 U/L     (HH) 0 - 45 U/L   Phosphorus   Result Value Ref Range    Phosphorus 5.0 (H) 2.5 - 4.5 mg/dL   Calcium ionized   Result Value Ref Range    Calcium Ionized 4.4 4.4 - 5.2 mg/dL   Magnesium   Result Value Ref Range    Magnesium 2.6 (H) 1.6 - 2.3 mg/dL   CBC with platelets differential   Result Value Ref Range    WBC 10.1 4.0 - 11.0 10e9/L    RBC Count 2.52 (L) 4.4 - 5.9 10e12/L    Hemoglobin 7.0 (L) 13.3 - 17.7 g/dL    Hematocrit 23.3 (L) 40.0 - 53.0 %    MCV 93 78 - 100 fl    MCH 27.8 26.5 - 33.0 pg    MCHC 30.0 (L) 31.5 - 36.5 g/dL    RDW 15.7 (H) 10.0 - 15.0 %    Platelet Count 213 150 - 450 10e9/L    Diff Method Automated Method     % Neutrophils 94.6 %    % Lymphocytes 2.8 %    % Monocytes 2.0 %    % Eosinophils 0.1 %    % Basophils 0.2 %    % Immature Granulocytes 0.3 %    Nucleated RBCs 0 0 /100    Absolute Neutrophil 9.6 (H) 1.6 - 8.3 10e9/L    Absolute Lymphocytes 0.3 (L) 0.8 - 5.3 10e9/L    Absolute Monocytes 0.2 0.0 - 1.3 10e9/L    Absolute Eosinophils 0.0 0.0 - 0.7 10e9/L    Absolute Basophils 0.0 0.0 - 0.2 10e9/L    Abs Immature Granulocytes 0.0 0 - 0.4 10e9/L    Absolute Nucleated RBC 0.0    Glucose by meter   Result Value Ref Range    Glucose 177 (H) 70 - 99 mg/dL

## 2019-01-14 NOTE — PROGRESS NOTES
Social Work Services Progress Note    Hospital Day: 4  Date of Initial Social Work Evaluation:  TBD  Collaborated with:  Family members at bedside    Data:  Angle Munguia is an 83 year old male hospitalized due to acute respiratory failure and has been transitioned to comfort cares.    Intervention:    Family is at bedside due to patient transition to comfort cares. Family members requested letters for work- 7 letters provided to individual family members.    Assessment:  Patient transitioned to comfort cares    Plan:    Follow Up:  SW following for additional resource and support needs.    GETACHEW Hernandez, [facility]  ICU 4A & 4C   Ph: [phone]  Pager: [phone]

## 2019-01-14 NOTE — PROGRESS NOTES
MICU Progress Note  Angle Munguia MRN: [id number]  1935  Date of Admission:1/11/2019  Primary care provider: La Nena Garcia      ASSESSMENT & PLAN :    Angle Munguia is a 83 year old male with a PMHx of ESRD, HTN, DM II, gout admitted with acute hypoxic respiratory failure requiring intubation and septic shock 2/2 bacterial pneumonia.    CHANGES TODAY:  - Discontinue dialysis, pressors, and mechanical ventilation per decision of patient's primary decision-maker and transition to treatments aimed at patient comfort    ===NEURO===  # Sedation/Analgesia  Propofol and fentanyl    # History of seizures  - Continue PTA levetiracetam    ===CARDIOVASCULAR===    # Shock  Suspected 2/2 sepsis from pneumonia, hypovolemia. Given 500 ml LR bolus x 2 with improvements in MAPs from upper 50s to 70s. Troponin negative in ED. Initially fluid responsive on 1/12, however, required support with three pressors overnight. Cards did a bedside echo overnight on 1/12 which was normal by verbal report. Procalcitonin initially 0.94 with worsening to 6.73 on 1/12. CXR 1/12 on admission with patchy opacification of the right hemithorax suggestive of infection.       # TTE in 2014  - Normal EF (55-60%) without valvular dysfunction    # Bradycardia, resolved  Had episodes of junctional rhythm overnight on 1/11, rates in 40s. Pt likely unable to tolerate lower HR given sepsis. May have been due to propofol/dexmedetomidine.       ===RESPIRATORY===    # Acute hypoxic respiratory failure  # Bacterial pneumonia  Patient reportedly hypoxic at outpatient dialysis preceding presentation with intubation in ED on presentation on 1/11. 2/2 CAP. No risk factors for resistant organisms. BCs NGTD, sputum NGTD. Repeat CXR shows continued infiltrates in right lung and some possible worsening in left raising concern for developing ARDS. However, never required high ventilatory support.       Ventilation Mode: CMV/AC  (Continuous Mandatory Ventilation/  Assist Control)  FiO2 (%): 45 %  Rate Set (breaths/minute): 16 breaths/min  Tidal Volume Set (mL): 550 mL  PEEP (cm H2O): 7 cmH2O  Oxygen Concentration (%): (S) 40 %  Resp: 26      SpO2: 100 %   O2 Device: Mechanical Ventilator           ===GASTROINTESTINAL===    # GERD  On PTA omperazole      ===RENAL / ELECTROLYTES===    # ESRD on HD  Previously with malfunctioning dialysis fistula in December 2016 s/p thrombectomy 12/30. CVC RIJ tunneled catheter placed 12/28. Per nephrology, estimated dry weight of 62.5 kg. Weight on admission 61.8 kg. Unclear if this represents error in weight or if patient truly volume down. Does not appear significantly volume overloaded on exam. Partial dialysis done at [facility] 1/11. CRRT initiated by nephrology on 1/12.      ===INFECTIOUS DISEASE===       # Suspected PNA   # Acute hypoxic respiratory failure  Now febrile, WBC 17.5, procal 6.73. CXR suggestive of pulmonary edema, infection, ARDS, or other diffuse alveolar damage. Started on pip-satinder in the ED with addition of vancomycin. Lactate 5.2.     # Antimicrobials  - Vancomycin (1/11-1/13)  - Zosyn (1/11-1/14)  - Azithromycin (1/12)  - Levofloxacin (1/12-1/13)      ===HEMATOLOGY===  # Anemia  Likely anemia of chronic kidney disease. Hb stable from prior on discharge earlier in the month. Ferritin high (acute reactant protein), iron normal, TIBC low.      ===ENDOCRINE===  # DM II  BGs variable      ===SKIN/RHEUM/MSK===  # Gout      GI PPx: None  DVT PPx: None  CODE: DNR/DNI    Patient seen and discussed with staff attending, Dr. Madrigal, who agrees with above assessment and plan.      Vanessa Fregoso  PGY-1, Internal Medicine  P: [phone]      Attending note:  Patient seen, examined and discussed with the Resident physicians. All data reviewed. Agree with assessment and plan as outlined in the above note.  The patient remains critically ill with ESRD, acute respiratory failure, septic shock and pneumonia.  Remains dependent on vasopressors  but dosages decreasing, continued ventilatory support- minimal support at present.  Family discussed overall cares and prognosis overnight; extensive discussion on rounds at bedside regarding continuation of aggressive care. Family has elected to proceed with comfort cares.         Total Critical Care time 40 minutes     Khloe Madrigal MD  [phone]          ______________________________________________________________________  PHYSICAL EXAM  Temp  Av.4  F (36.9  C)  Min: 97.6  F (36.4  C)  Max: 101.5  F (38.6  C)  Arterial Line BP  Min: 60/41  Max: 107/52  Arterial Line MAP (mmHg)  Av.4 mmHg  Min: 48 mmHg  Max: 73 mmHg      Pulse  Av.2  Min: 49  Max: 132 Resp  Av.7  Min: 11  Max: 43  FiO2 (%)  Av %  Min: 40 %  Max: 45 %  SpO2  Av.8 %  Min: 41 %  Max: 100 %         Intake/Output Summary (Last 24 hours) at 2019 0901  Last data filed at 2019 0800  Gross per 24 hour   Intake 4040.25 ml   Output 0 ml   Net 4040.25 ml     Wt Readings from Last 4 Encounters:   19 65.6 kg (144 lb 10 oz)   19 64.1 kg (141 lb 5 oz)   18 62.8 kg (138 lb 8 oz)   18 54.4 kg (120 lb)       Ventilation Mode: CMV/AC  (Continuous Mandatory Ventilation/ Assist Control)  FiO2 (%): 30 %  Rate Set (breaths/minute): 16 breaths/min  Tidal Volume Set (mL): 550 mL  PEEP (cm H2O): 5 cmH2O  Oxygen Concentration (%): 30 %  Resp: 16    Arterial Line BP: ()/(32-52) 82/33  MAP:  [46 mmHg-86 mmHg] 48 mmHg  BP - Mean:  [66-98] 66    GEN: Elderly, ill. Sedated.  EYES: PERRL, Anicteric sclera.   CV: Tachcyardia, rhythm regular  PULM/CHEST: CTAB, symmetric chest rise. Ventilator.  GI: Normal bowel sounds, non-tender, no rebound tenderness or guarding, no masses  : Stokes catheter in place  EXTREMITIES: No pedal edema, moving all extremities, peripheral pulses intact, sutures intact on left upper extremity. Edema of left upper extremity without significant erythema or appreciable fluctuance  NEURO:  Moves all extremities without gross deficit  SKIN: No rashes, sores or ulcerations      DIAGNOSTIC STUDIES  ROUTINE ICU LABS (Last four results)  CMP  Recent Labs   Lab 01/14/19  0938 01/14/19 0350 01/13/19 2237 01/13/19  1533    136 135 134   POTASSIUM 4.6 5.0 5.3 5.4*   CHLORIDE 104 103 103 104   CO2 22 20 21 18*   ANIONGAP 11 12 12 11   * 230* 180* 157*   BUN 25 24 21 21   CR 2.02* 2.18* 2.33* 2.56*   GFRESTIMATED 30* 27* 25* 22*   GFRESTBLACK 35* 32* 29* 26*   SHIRA 8.1* 7.8* 7.9* 8.0*   MAG 2.6* 2.7* 2.6* 2.6*   PHOS 4.7* 4.5 5.0* 4.6*   PROTTOTAL 6.5* 6.2* 6.3* 6.2*   ALBUMIN 2.5* 2.4* 2.4* 2.4*   BILITOTAL 1.0 1.0 1.3 1.3   ALKPHOS 130 116 108 106   * 824* 961* 1,118*   * 512* 584* 606*     CBC  Recent Labs   Lab 01/14/19  0938 01/14/19 0350 01/13/19 2237 01/13/19  1533   WBC 10.7 10.6 10.1 9.9   RBC 2.77* 2.41* 2.52* 2.57*   HGB 7.8* 6.8* 7.0* 7.2*   HCT 25.4* 22.3* 23.3* 24.1*   MCV 92 93 93 94   MCH 28.2 28.2 27.8 28.0   MCHC 30.7* 30.5* 30.0* 29.9*   RDW 15.1* 15.7* 15.7* 15.8*    240 213 198     INR  Recent Labs   Lab 01/11/19  1254   INR 1.23*     Arterial Blood Gas  Recent Labs   Lab 01/14/19  0938 01/13/19  0942 01/12/19  1012 01/12/19  0513   PH 7.39 7.36 7.38 7.34*   PCO2 38 39 35 36   PO2 106* 127* 128* 64*   HCO3 23 22 21 19*   O2PER 35.0 40 45 40.0

## 2019-01-14 NOTE — PROGRESS NOTES
[facility], [place]    Vascular Surgery Progress Note    Assessment & Plan      82 y/o male s/p left HERO graft placement in 11/2018. Evacuation of left arm hematoma from sticking of HERO graft 12/30/18. Presents to [place] with pneumonia and septic shock.   Patient receiving CVVHD via his right internal jugular tunneled catheter. Continue with dialysis through catheter. Allow the HERO graft time to heal.   --Will follow up ultrasound venous duplex to r/o DVT due to swelling.     Active Problems:    Acute respiratory failure with hypoxia (H)      Min Lucila Norman MD    Interval History   Patient is intubated and sedated on levo gtt. Made DNR last night.     Physical Exam   Temp: 96.8  F (36  C) Temp src: Axillary BP: 144/69 Pulse: 92 Heart Rate: 93 Resp: 21 SpO2: 98 % O2 Device: Mechanical Ventilator    Vitals:    01/11/19 1750 01/13/19 0600 01/14/19 0530   Weight: 62 kg (136 lb 11 oz) 65.7 kg (144 lb 13.5 oz) 65.6 kg (144 lb 10 oz)     Vital Signs with Ranges  Temp:  [96.8  F (36  C)-98.2  F (36.8  C)] 96.8  F (36  C)  Pulse:  [92-95] 92  Heart Rate:  [] 93  Resp:  [16-21] 21  BP: (134-155)/(53-72) 144/69  Cuff Mean (mmHg):  [76] 76  MAP:  [43 mmHg-80 mmHg] 73 mmHg  Arterial Line BP: ()/(32-54) 124/46  FiO2 (%):  [35 %-40 %] 35 %  SpO2:  [92 %-100 %] 98 %  I/O last 3 completed shifts:  In: 2812.38 [I.V.:1772.38; NG/GT:270]  Out: 3502 [Other:3502]    PE:   NAD, awake and alert  Chest: S1 S2 present, RRR, no wheezing   Abd: soft, NT, ND, no rebound or guarding  Ex: LUE with a-line in the radial artery. Hand is warm. Swelling in the LUE unchanged. Incision c/d/i     Medications     IV fluid REPLACEMENT ONLY       CRRT replacement solution 20 mL/kg/hr (01/14/19 0551)     DOPamine Stopped (01/13/19 0830)     EPINEPHrine IV infusion ADULT Stopped (01/12/19 0915)     fentaNYL 50 mcg/hr (01/14/19 0849)     - MEDICATION INSTRUCTIONS -       norepinephrine 0.1 mcg/kg/min (01/14/19 0849)      CRRT replacement solution 3.236 mL/kg/hr (01/13/19 1248)     CRRT replacement solution 12.5 mL/kg/hr (01/14/19 0511)     propofol (DIPRIVAN) infusion 45 mcg/kg/min (01/14/19 0800)     sodium chloride 5 mL/hr at 01/13/19 0100     vasopressin (PITRESSIN) infusion ADULT (40 mL) Stopped (01/14/19 0106)        B and C vitamin Complex with folic acid  5 mL Per Feeding Tube Daily     heparin  5,000 Units Subcutaneous Q8H     hydrocortisone sodium succinate PF  50 mg Intravenous Q6H     influenza Vac Split High-Dose  0.5 mL Intramuscular Prior to discharge     insulin aspart  1-6 Units Subcutaneous Q4H     levETIRAcetam  500 mg Oral or Feeding Tube BID     levofloxacin  750 mg Intravenous Q24H     pantoprazole  40 mg Oral or Feeding Tube QAM AC     piperacillin-tazobactam  4.5 g Intravenous Q6H     protein modular  1 packet Per Feeding Tube BID     vancomycin (VANCOCIN) IV  1,250 mg Intravenous Q24H       Data   Lab Results   Component Value Date    WBC 10.6 01/14/2019     Lab Results   Component Value Date    RBC 2.41 01/14/2019     Lab Results   Component Value Date    HGB 6.8 01/14/2019     Lab Results   Component Value Date    HCT 22.3 01/14/2019     Lab Results   Component Value Date    MCV 93 01/14/2019     Lab Results   Component Value Date    MCH 28.2 01/14/2019     Lab Results   Component Value Date    MCHC 30.5 01/14/2019     Lab Results   Component Value Date    RDW 15.7 01/14/2019     Lab Results   Component Value Date     01/14/2019     Last Comprehensive Metabolic Panel:  Sodium   Date Value Ref Range Status   01/14/2019 136 133 - 144 mmol/L Final     Potassium   Date Value Ref Range Status   01/14/2019 5.0 3.4 - 5.3 mmol/L Final     Chloride   Date Value Ref Range Status   01/14/2019 103 94 - 109 mmol/L Final     Carbon Dioxide   Date Value Ref Range Status   01/14/2019 20 20 - 32 mmol/L Final     Anion Gap   Date Value Ref Range Status   01/14/2019 12 3 - 14 mmol/L Final     Glucose   Date Value  Ref Range Status   01/14/2019 230 (H) 70 - 99 mg/dL Final     Urea Nitrogen   Date Value Ref Range Status   01/14/2019 24 7 - 30 mg/dL Final     Creatinine   Date Value Ref Range Status   01/14/2019 2.18 (H) 0.66 - 1.25 mg/dL Final     GFR Estimate   Date Value Ref Range Status   01/14/2019 27 (L) >60 mL/min/[1.73_m2] Final     Comment:     Non  GFR Calc  Starting 12/18/2018, serum creatinine based estimated GFR (eGFR) will be   calculated using the Chronic Kidney Disease Epidemiology Collaboration   (CKD-EPI) equation.       Calcium   Date Value Ref Range Status   01/14/2019 7.8 (L) 8.5 - 10.1 mg/dL Final

## 2019-01-15 NOTE — PLAN OF CARE
Patient on comfort cares; PRN doses of Fentanyl given per family request for pain and per nursing assessment of labored breathing/air hunger.

## 2019-01-15 NOTE — PROGRESS NOTES
MD DEATH PRONOUNCEMENT    Called to pronounce Angle Munguia dead.    Physical Exam: Unresponsive to noxious stimuli, Spontaneous respirations absent, Breath sounds absent, Heart sounds absent and Pupillary light reflex absent    Patient was pronounced dead at 12:33 AM, January 15, 2019.    Active Problems:    Acute respiratory failure with hypoxia (H)       Infectious disease present?: YES (sepsis secondary to pneumonia)    Communicable disease present? (examples: HIV, chicken pox, TB, Ebola, CJD) :  NO    Multi-drug resistant organism present? (example: MRSA): NO    Please consider an autopsy if any of the following exist:  NO Unexpected or unexplained death during or following any dental, medical, or surgical diagnostic treatment procedures.   NO Death of mother at or up to seven days after delivery.     NO All  and pediatric deaths.     NO Death where the cause is sufficiently obscure to delay completion of the death certificate.   NO Deaths in which autopsy would confirm a suspected illness/condition that would affect surviving family members or recipients of transplanted organs.     The following deaths must be reported to the 's Office:  NO A death that may be due entirely or in part to any factors other than natural disease (recent surgery, recent trauma, suspected abuse/neglect).   NO A death that may be an accident, suicide, or homicide.     NO Any sudden, unexpected death in which there is no prior history of significant heart disease or any other condition associated with sudden death.   NO A death under suspicious, unusual, or unexpected circumstances.    NO Any death which is apparently due to natural causes but in which the  does not have a personal physician familiar with the patient s medical history, social, or environmental situation or the circumstances of the terminal event.   NO Any death apparently due to Sudden Infant Death Syndrome.     NO Deaths that occur during, in  association with, or as consequences of a diagnostic, therapeutic, or anesthetic procedure.   NO Any death in which a fracture of a major bone has occurred within the past (6) six months.   NO A death of persons note seen by their physician within 120 days of demise.     NO Any death in which the  was an inmate of a public institution or was in the custody of Law Enforcement personnel.   NO  All unexpected deaths of children   NO Solid organ donors   NO Unidentified bodies   NO Deaths of persons whose bodies are to be cremated or otherwise disposed of so that the bodies will later be unavailable for examination;   NO Deaths unattended by a physician outside of a licensed healthcare facility or licensed residential hospice program   NO Deaths occurring within 24 hours of arrival to a health care facility if death is unexpected.    NO Deaths associated with the decedent s employment.   NO Deaths attributed to acts of terrorism.   NO Any death in which there is uncertainty as to whether it is a medical examiner s care should be discussed with the medical investigator.        Body disposition: Autopsy was discussed with family member:  Family members in person.  Permission for autopsy was declined.    Beronica Chavez MD  PGY-2 Internal Medicine  Pager [phone]

## 2019-01-16 NOTE — DISCHARGE SUMMARY
MICU Death Summary   Angle Munguia MRN: [id number]  Age: 83 year old, : 1935  Primary care provider: La Nena Garcia       Date of Admission:  2019  Date of Discharge:  1/15/2019  5:38 AM  Admitting Physician:  Rikki Salinas MD  Discharge Physician:  Akil Madrigal MD  Discharging Service:  Intensive Care    Admission Diagnoses:   Hypoxic respiratory failure     Cause of Death :   Progressive hypoxic respiratory failure  Secondary causes of death include PEA arrest        Hospitalization Summary     Mr. Angle Munguia is an 83-year-old male with past medical history of ESRD, , Hypertension, type 2 diabetes, gout initially admitted with acute hypoxic respiratory failure on 19.  On admission, the patient presented with shock as well as sepsis thought to be due to aspiration pneumonia.  He was fluid resuscitated, started on pressors, intubated, and started on broad-spectrum antibiotics. He underwent a BAL however, ultimately, cultures presented with no growth. Clinically, the patient failed to improve; repeat chest x-rays showed possibility of ARDS.  Ultimately, given failure of improvement clinically, 3 days status post intubation, the patient's family as well as primary team discussed overall goals of care and quality of life.  Ultimately, decision was made to move toward comfort.   The patient  on 01/15/19 at 12:33 AM.     Consultants   Vascular Surgery - for concern of LUE swelling in the setting of fistula.     Procedures   Intubation   Bronchoscopy and BAL   Internal Jugular Line Placement   Arterial Line Placement     Physical Exam       Please see Exam from date of death  Patient was pronounced dead at: 1/15/2019 12.33AM  Autopsy was declined by family.       Attending note:  Patient seen, examined and discussed with the Resident physicians on daily rounds.  The above death summary accurately reflects the patient's hospital course.    Khloe Madrigal MD  [phone]

## 2019-01-17 LAB
BACTERIA SPEC CULT: NO GROWTH
BACTERIA SPEC CULT: NO GROWTH
Lab: NORMAL
Lab: NORMAL
SPECIMEN SOURCE: NORMAL
SPECIMEN SOURCE: NORMAL

## 2019-01-26 LAB
BACTERIA SPEC CULT: NORMAL
SPECIMEN SOURCE: NORMAL

## 2019-02-11 LAB
BACTERIA SPEC CULT: NORMAL
FUNGUS SPEC CULT: NORMAL
SPECIMEN SOURCE: NORMAL
SPECIMEN SOURCE: NORMAL

## 2019-03-11 LAB
MYCOBACTERIUM SPEC CULT: NORMAL
MYCOBACTERIUM SPEC CULT: NORMAL
SPECIMEN SOURCE: NORMAL

## 2020-12-28 NOTE — PLAN OF CARE
HR=47 bpm, KIJT=462/68 mmhg, GcR7=281.0 %, Resp=11 B/min, EtCO2=34 mmHg, Apnea=2 Seconds, Diaz=2 Remains intubated and sedated. On propofol and fentanyl, becomes agitated/restless with cares. 40% FiO2, PEEP 7. Bronch completed and BAL sample sent to lab. Tmax 102, BCx2 obtained. SR- sinus tach 90-110s. Titrating pressors to maintain MAP > 65, currently on levo, dopamine, and vaso. TF initiated this evening through OG. CRRT started at 1145, goal net even, pt tolerating this with minimal pressor adjustments. Calcium/mag replaced this shift. Family at bedside and updated on plan of care. Plan to continue CRRT, wean pressors as able. Increase TF per orders. Continue abx for presumed PNA.    Restraint for Non-Violent/Non-Self-Destructive Behavior  Prevent/Manage Potential Problems  Description  Maintain safety of patient and others during period of restraint.  Promote psychological and physical wellbeing.  Prevent injury to skin and involved body parts.  Promote nutrition, hydration, and elimination.  1/12/2019 1728 - No Change by Ken Bender, RN  Pt remains in bilateral wrist restraints for safety due to episodes of agitation where he attempts to pull at lines.

## 2024-08-29 NOTE — PLAN OF CARE
Assumed care of patient approx 1630.  Patient comfort cares at that time.  Fentanyl 25 mcg given for patient reported pain to family.  Will continue to monitor and assess.    soft/nondistended/nontender

## (undated) DEVICE — PREP CHLORAPREP 26ML TINTED ORANGE  260815

## (undated) DEVICE — SU SILK 3-0 TIE 24" SA74H

## (undated) DEVICE — SOL WATER IRRIG 1000ML BOTTLE 2F7114

## (undated) DEVICE — CATH FOGARTY EMBOLECTOMY 4FR 80CM LATEX 120804FP

## (undated) DEVICE — DRAPE C-ARM 60X42" 1013

## (undated) DEVICE — DRAPE EXTREMITY W/ARMBOARD 29405

## (undated) DEVICE — DRAPE LAP W/ARMBOARD 29410

## (undated) DEVICE — DRAIN JACKSON PRATT 19FR ROUND SU130-1325

## (undated) DEVICE — SOL NACL 0.9% INJ 1000ML BAG 2B1324X

## (undated) DEVICE — SYR 10ML SLIP TIP W/O NDL

## (undated) DEVICE — SU SILK 4-0 TIE 12X30" A303H

## (undated) DEVICE — DECANTER BAG 2002S

## (undated) DEVICE — RAD RX ISOVUE 300 (50ML) 61% IOPAMIDOL CHARGE PER ML

## (undated) DEVICE — DRSG KERLIX 4 1/2"X4YDS ROLL 6715

## (undated) DEVICE — INSERT EVERGRIP 33MM

## (undated) DEVICE — PACK SPECIAL PROCEDURE CUSTOM

## (undated) DEVICE — DECANTER VIAL 2006S

## (undated) DEVICE — LINEN TOWEL PACK X5 5464

## (undated) DEVICE — DRAIN JACKSON PRATT RESERVOIR 100ML SU130-1305

## (undated) DEVICE — SU PROLENE 7-0 BV-1DA 24" 8702H

## (undated) DEVICE — SU ETHILON 3-0 FS-1 18" 669H

## (undated) DEVICE — ESU GROUND PAD UNIVERSAL W/O CORD

## (undated) DEVICE — VESSEL LOOPS YELLOW MINI 31145660

## (undated) DEVICE — SU SILK 2-0 TIE 24" SA75H

## (undated) DEVICE — DRSG STERI STRIP 1/2X4" R1547

## (undated) DEVICE — SU MONOCRYL 4-0 PS-2 18" UND Y496G

## (undated) DEVICE — PACK AV FISTULA CUSTOM SCV15AVFS1

## (undated) DEVICE — GLOVE PROTEXIS W/NEU-THERA 7.5  2D73TE75

## (undated) DEVICE — Device

## (undated) DEVICE — SYR 01ML 27GA 0.5" NDL TBC 309623

## (undated) DEVICE — DRAPE SHEET REV FOLD 3/4 9349

## (undated) DEVICE — NDL 19GA 1.5"

## (undated) DEVICE — SU VICRYL 3-0 SH 27" J316H

## (undated) DEVICE — TUBING SUCTION 12"X1/4" N612

## (undated) DEVICE — SYR 30ML LL W/O NDL 302832

## (undated) DEVICE — SU PROLENE 6-0 C-1DA 30" M8706

## (undated) DEVICE — SOL NACL 0.9% INJ 250ML BAG 2B1322Q

## (undated) DEVICE — SPONGE LAP 18X18" 23250-400

## (undated) DEVICE — SU PROLENE 6-0 C-1DA 30" 8706H

## (undated) RX ORDER — ONDANSETRON 2 MG/ML
INJECTION INTRAMUSCULAR; INTRAVENOUS
Status: DISPENSED
Start: 2018-01-01

## (undated) RX ORDER — LIDOCAINE HYDROCHLORIDE 20 MG/ML
INJECTION, SOLUTION EPIDURAL; INFILTRATION; INTRACAUDAL; PERINEURAL
Status: DISPENSED
Start: 2018-01-01

## (undated) RX ORDER — PROPOFOL 10 MG/ML
INJECTION, EMULSION INTRAVENOUS
Status: DISPENSED
Start: 2018-01-01

## (undated) RX ORDER — CEFAZOLIN SODIUM 2 G/100ML
INJECTION, SOLUTION INTRAVENOUS
Status: DISPENSED
Start: 2018-01-01

## (undated) RX ORDER — CEFAZOLIN SODIUM 1 G/3ML
INJECTION, POWDER, FOR SOLUTION INTRAMUSCULAR; INTRAVENOUS
Status: DISPENSED
Start: 2018-01-01

## (undated) RX ORDER — FENTANYL CITRATE 50 UG/ML
INJECTION, SOLUTION INTRAMUSCULAR; INTRAVENOUS
Status: DISPENSED
Start: 2018-01-01

## (undated) RX ORDER — HEPARIN SODIUM 1000 [USP'U]/ML
INJECTION, SOLUTION INTRAVENOUS; SUBCUTANEOUS
Status: DISPENSED
Start: 2018-01-01

## (undated) RX ORDER — BUPIVACAINE HYDROCHLORIDE 5 MG/ML
INJECTION, SOLUTION EPIDURAL; INTRACAUDAL
Status: DISPENSED
Start: 2018-01-01

## (undated) RX ORDER — LIDOCAINE HYDROCHLORIDE 10 MG/ML
INJECTION, SOLUTION INFILTRATION; PERINEURAL
Status: DISPENSED
Start: 2018-01-01

## (undated) RX ORDER — KETAMINE HYDROCHLORIDE 10 MG/ML
INJECTION INTRAMUSCULAR; INTRAVENOUS
Status: DISPENSED
Start: 2018-01-01